# Patient Record
Sex: FEMALE | Race: OTHER | HISPANIC OR LATINO | URBAN - METROPOLITAN AREA
[De-identification: names, ages, dates, MRNs, and addresses within clinical notes are randomized per-mention and may not be internally consistent; named-entity substitution may affect disease eponyms.]

---

## 2017-10-24 ENCOUNTER — EMERGENCY (EMERGENCY)
Facility: HOSPITAL | Age: 82
LOS: 0 days | Discharge: HOME | End: 2017-10-24
Attending: EMERGENCY MEDICINE
Payer: COMMERCIAL

## 2017-10-24 VITALS
HEART RATE: 95 BPM | SYSTOLIC BLOOD PRESSURE: 140 MMHG | RESPIRATION RATE: 19 BRPM | OXYGEN SATURATION: 99 % | WEIGHT: 139.99 LBS | DIASTOLIC BLOOD PRESSURE: 83 MMHG | HEIGHT: 62 IN | TEMPERATURE: 99 F

## 2017-10-24 PROBLEM — Z00.00 ENCOUNTER FOR PREVENTIVE HEALTH EXAMINATION: Status: ACTIVE | Noted: 2017-10-24

## 2017-10-24 PROCEDURE — 99283 EMERGENCY DEPT VISIT LOW MDM: CPT

## 2017-10-24 RX ORDER — FLUCONAZOLE 150 MG/1
150 TABLET ORAL ONCE
Qty: 0 | Refills: 0 | Status: COMPLETED | OUTPATIENT
Start: 2017-10-24 | End: 2017-10-24

## 2017-10-24 RX ORDER — FLUCONAZOLE 150 MG/1
1 TABLET ORAL
Qty: 1 | Refills: 0 | OUTPATIENT
Start: 2017-10-24

## 2017-10-24 RX ORDER — TERBINAFINE HYDROCHLORIDE 1 G/100G
1 CREAM TOPICAL
Qty: 1 | Refills: 0 | OUTPATIENT
Start: 2017-10-24

## 2017-10-24 RX ADMIN — FLUCONAZOLE 150 MILLIGRAM(S): 150 TABLET ORAL at 21:38

## 2017-10-24 NOTE — ED PROVIDER NOTE - MEDICAL DECISION MAKING DETAILS
liklely fungal infx given location in sweaty areas, pruritic and possible ring-worm like lesions seen. pt appears well/afebrile, and no crepitus. given antifungal cream, pill, and recommended antifungal soap. pt appears well and non-toxic. . VSS. Sx have improved during ED stay. No acute events during ED observation period. Precautions given to return to the ED if sx persist or change. Pt expresses understanding and has no further questions. Pt feels comfortable and wishes to be discharged home. Instructed to follow up with PMD in 24 hrs. dermatology f/u.

## 2017-10-24 NOTE — ED PROVIDER NOTE - PHYSICAL EXAMINATION
GEN: Alert, NAD  HEAD: atraumatic, EOMI, PERRL, normal lids/conjunctiva  ENT: normal hearing, patent oropharynx without erythema/exudate, uvula midline  NECK: +supple, no tenderness/meningismus, +Trachea midline  PULM: Bilateral BS, normal resp effort, no wheeze/stridor/retractions  CV: RRR, no M/R/G, +dist ext pulses  ABD: soft, NT/ND  BACK: no CVAT, no midline tenderness  MSK: no edema/erythema/cyanosis  SKIN: no rash  NEURO: AAOx3, no sensory/motor deficits, CN 2-12 intact  under b/l breasts and at b/l groins - slightly erythematous patchy area, no macular/papular lesions, has secondary excoriations seen w slightly sloughing of the superficial skin, no blistering, ring-worm like lesion seen under R breast area, no crepitus,

## 2017-10-24 NOTE — ED PROVIDER NOTE - PRESENTING SYMPTOMS DETAILS
88F no pmhx/shx comes in w rash started 1 month intermittently under her b/l breasts and groins. pruritis, got better but then worsened. steroid cream helps but this time around it did not, no fever/chills. no oral lesions, no genital lesions. pt does not wear a bra and tends to sweat under her breasts per family  ROS: No fever/chills, No photophobia/eye pain/changes in vision, No ear pain/sore throat/dysphagia, No chest pain/palpitations, no SOB/cough/wheeze/stridor, No abdominal pain/N/V/D, no dysuria/frequency/discharge, No neck/back pain,  no changes in neurological status/function.

## 2017-10-24 NOTE — ED ADULT NURSE REASSESSMENT NOTE - NS ED NURSE REASSESS COMMENT FT1
patient remains alert and orientedx 3, no complaints made, denies pain at this time. left with daughter to drive her home

## 2017-10-25 DIAGNOSIS — B49 UNSPECIFIED MYCOSIS: ICD-10-CM

## 2017-10-25 DIAGNOSIS — R21 RASH AND OTHER NONSPECIFIC SKIN ERUPTION: ICD-10-CM

## 2017-11-19 ENCOUNTER — INPATIENT (INPATIENT)
Facility: HOSPITAL | Age: 82
LOS: 1 days | Discharge: ANOTHER HOSPITAL | End: 2017-11-21
Attending: INTERNAL MEDICINE | Admitting: INTERNAL MEDICINE
Payer: COMMERCIAL

## 2017-11-19 VITALS
RESPIRATION RATE: 22 BRPM | HEART RATE: 107 BPM | SYSTOLIC BLOOD PRESSURE: 178 MMHG | TEMPERATURE: 97 F | DIASTOLIC BLOOD PRESSURE: 103 MMHG | OXYGEN SATURATION: 100 %

## 2017-11-19 LAB
ALBUMIN SERPL ELPH-MCNC: 3.9 G/DL — SIGNIFICANT CHANGE UP (ref 3.3–5)
ALP SERPL-CCNC: 65 U/L — SIGNIFICANT CHANGE UP (ref 40–120)
ALT FLD-CCNC: 21 U/L — SIGNIFICANT CHANGE UP (ref 12–78)
ANION GAP SERPL CALC-SCNC: 7 MMOL/L — SIGNIFICANT CHANGE UP (ref 5–17)
APPEARANCE UR: CLEAR — SIGNIFICANT CHANGE UP
AST SERPL-CCNC: 15 U/L — SIGNIFICANT CHANGE UP (ref 15–37)
BACTERIA # UR AUTO: ABNORMAL
BASOPHILS # BLD AUTO: 0 K/UL — SIGNIFICANT CHANGE UP (ref 0–0.2)
BASOPHILS NFR BLD AUTO: 0.8 % — SIGNIFICANT CHANGE UP (ref 0–2)
BILIRUB SERPL-MCNC: 0.3 MG/DL — SIGNIFICANT CHANGE UP (ref 0.2–1.2)
BILIRUB UR-MCNC: NEGATIVE — SIGNIFICANT CHANGE UP
BUN SERPL-MCNC: 26 MG/DL — HIGH (ref 7–23)
CALCIUM SERPL-MCNC: 8.9 MG/DL — SIGNIFICANT CHANGE UP (ref 8.5–10.1)
CHLORIDE SERPL-SCNC: 105 MMOL/L — SIGNIFICANT CHANGE UP (ref 96–108)
CK MB CFR SERPL CALC: 6.8 NG/ML — HIGH (ref 0.5–3.6)
CK SERPL-CCNC: 166 U/L — SIGNIFICANT CHANGE UP (ref 26–192)
CO2 SERPL-SCNC: 28 MMOL/L — SIGNIFICANT CHANGE UP (ref 22–31)
COLOR SPEC: YELLOW — SIGNIFICANT CHANGE UP
CREAT SERPL-MCNC: 0.73 MG/DL — SIGNIFICANT CHANGE UP (ref 0.5–1.3)
DIFF PNL FLD: ABNORMAL
EOSINOPHIL # BLD AUTO: 0.2 K/UL — SIGNIFICANT CHANGE UP (ref 0–0.5)
EOSINOPHIL NFR BLD AUTO: 3.8 % — SIGNIFICANT CHANGE UP (ref 0–6)
EPI CELLS # UR: SIGNIFICANT CHANGE UP
FLUAV SPEC QL CULT: NEGATIVE — SIGNIFICANT CHANGE UP
FLUBV AG SPEC QL IA: NEGATIVE — SIGNIFICANT CHANGE UP
GLUCOSE SERPL-MCNC: 108 MG/DL — HIGH (ref 70–99)
GLUCOSE UR QL: 1000 MG/DL
HCT VFR BLD CALC: 36.2 % — SIGNIFICANT CHANGE UP (ref 34.5–45)
HGB BLD-MCNC: 11.6 G/DL — SIGNIFICANT CHANGE UP (ref 11.5–15.5)
KETONES UR-MCNC: NEGATIVE — SIGNIFICANT CHANGE UP
LEUKOCYTE ESTERASE UR-ACNC: ABNORMAL
LIDOCAIN IGE QN: 59 U/L — LOW (ref 73–393)
LYMPHOCYTES # BLD AUTO: 1.3 K/UL — SIGNIFICANT CHANGE UP (ref 1–3.3)
LYMPHOCYTES # BLD AUTO: 23.8 % — SIGNIFICANT CHANGE UP (ref 13–44)
MCHC RBC-ENTMCNC: 29 PG — SIGNIFICANT CHANGE UP (ref 27–34)
MCHC RBC-ENTMCNC: 32.1 GM/DL — SIGNIFICANT CHANGE UP (ref 32–36)
MCV RBC AUTO: 90.3 FL — SIGNIFICANT CHANGE UP (ref 80–100)
MONOCYTES # BLD AUTO: 0.5 K/UL — SIGNIFICANT CHANGE UP (ref 0–0.9)
MONOCYTES NFR BLD AUTO: 8.4 % — SIGNIFICANT CHANGE UP (ref 2–14)
NEUTROPHILS # BLD AUTO: 3.6 K/UL — SIGNIFICANT CHANGE UP (ref 1.8–7.4)
NEUTROPHILS NFR BLD AUTO: 63.3 % — SIGNIFICANT CHANGE UP (ref 43–77)
NITRITE UR-MCNC: NEGATIVE — SIGNIFICANT CHANGE UP
NT-PROBNP SERPL-SCNC: 770 PG/ML — HIGH (ref 0–450)
PH UR: 5 — SIGNIFICANT CHANGE UP (ref 5–8)
PLATELET # BLD AUTO: 165 K/UL — SIGNIFICANT CHANGE UP (ref 150–400)
POTASSIUM SERPL-MCNC: 3.9 MMOL/L — SIGNIFICANT CHANGE UP (ref 3.5–5.3)
POTASSIUM SERPL-SCNC: 3.9 MMOL/L — SIGNIFICANT CHANGE UP (ref 3.5–5.3)
PROT SERPL-MCNC: 8.1 GM/DL — SIGNIFICANT CHANGE UP (ref 6–8.3)
PROT UR-MCNC: 15 MG/DL
RBC # BLD: 4 M/UL — SIGNIFICANT CHANGE UP (ref 3.8–5.2)
RBC # FLD: 13.2 % — SIGNIFICANT CHANGE UP (ref 11–15)
RBC CASTS # UR COMP ASSIST: SIGNIFICANT CHANGE UP /HPF (ref 0–4)
SODIUM SERPL-SCNC: 140 MMOL/L — SIGNIFICANT CHANGE UP (ref 135–145)
SP GR SPEC: 1.02 — SIGNIFICANT CHANGE UP (ref 1.01–1.02)
TROPONIN I SERPL-MCNC: 0.07 NG/ML — HIGH (ref 0.01–0.04)
TROPONIN I SERPL-MCNC: 0.07 NG/ML — HIGH (ref 0.01–0.04)
UROBILINOGEN FLD QL: NEGATIVE MG/DL — SIGNIFICANT CHANGE UP
WBC # BLD: 5.6 K/UL — SIGNIFICANT CHANGE UP (ref 3.8–10.5)
WBC # FLD AUTO: 5.6 K/UL — SIGNIFICANT CHANGE UP (ref 3.8–10.5)
WBC UR QL: ABNORMAL

## 2017-11-19 PROCEDURE — 71010: CPT | Mod: 26

## 2017-11-19 PROCEDURE — 93010 ELECTROCARDIOGRAM REPORT: CPT

## 2017-11-19 PROCEDURE — 99285 EMERGENCY DEPT VISIT HI MDM: CPT

## 2017-11-19 RX ORDER — DOCUSATE SODIUM 100 MG
100 CAPSULE ORAL THREE TIMES A DAY
Qty: 0 | Refills: 0 | Status: DISCONTINUED | OUTPATIENT
Start: 2017-11-19 | End: 2017-11-21

## 2017-11-19 RX ORDER — ASPIRIN/CALCIUM CARB/MAGNESIUM 324 MG
325 TABLET ORAL ONCE
Qty: 0 | Refills: 0 | Status: COMPLETED | OUTPATIENT
Start: 2017-11-19 | End: 2017-11-19

## 2017-11-19 RX ORDER — IPRATROPIUM/ALBUTEROL SULFATE 18-103MCG
3 AEROSOL WITH ADAPTER (GRAM) INHALATION
Qty: 0 | Refills: 0 | Status: COMPLETED | OUTPATIENT
Start: 2017-11-19 | End: 2017-11-19

## 2017-11-19 RX ORDER — CEFTRIAXONE 500 MG/1
1 INJECTION, POWDER, FOR SOLUTION INTRAMUSCULAR; INTRAVENOUS ONCE
Qty: 0 | Refills: 0 | Status: COMPLETED | OUTPATIENT
Start: 2017-11-19 | End: 2017-11-19

## 2017-11-19 RX ORDER — IPRATROPIUM/ALBUTEROL SULFATE 18-103MCG
3 AEROSOL WITH ADAPTER (GRAM) INHALATION EVERY 6 HOURS
Qty: 0 | Refills: 0 | Status: DISCONTINUED | OUTPATIENT
Start: 2017-11-19 | End: 2017-11-21

## 2017-11-19 RX ORDER — CEFTRIAXONE 500 MG/1
1 INJECTION, POWDER, FOR SOLUTION INTRAMUSCULAR; INTRAVENOUS EVERY 24 HOURS
Qty: 0 | Refills: 0 | Status: DISCONTINUED | OUTPATIENT
Start: 2017-11-20 | End: 2017-11-21

## 2017-11-19 RX ORDER — PANTOPRAZOLE SODIUM 20 MG/1
40 TABLET, DELAYED RELEASE ORAL
Qty: 0 | Refills: 0 | Status: DISCONTINUED | OUTPATIENT
Start: 2017-11-19 | End: 2017-11-21

## 2017-11-19 RX ORDER — SODIUM CHLORIDE 9 MG/ML
1000 INJECTION INTRAMUSCULAR; INTRAVENOUS; SUBCUTANEOUS
Qty: 0 | Refills: 0 | Status: DISCONTINUED | OUTPATIENT
Start: 2017-11-19 | End: 2017-11-21

## 2017-11-19 RX ORDER — CEFTRIAXONE 500 MG/1
INJECTION, POWDER, FOR SOLUTION INTRAMUSCULAR; INTRAVENOUS
Qty: 0 | Refills: 0 | Status: DISCONTINUED | OUTPATIENT
Start: 2017-11-19 | End: 2017-11-21

## 2017-11-19 RX ORDER — ASPIRIN/CALCIUM CARB/MAGNESIUM 324 MG
325 TABLET ORAL DAILY
Qty: 0 | Refills: 0 | Status: DISCONTINUED | OUTPATIENT
Start: 2017-11-19 | End: 2017-11-21

## 2017-11-19 RX ADMIN — Medication 3 MILLILITER(S): at 11:56

## 2017-11-19 RX ADMIN — Medication 50 MILLIGRAM(S): at 11:19

## 2017-11-19 RX ADMIN — SODIUM CHLORIDE 70 MILLILITER(S): 9 INJECTION INTRAMUSCULAR; INTRAVENOUS; SUBCUTANEOUS at 22:45

## 2017-11-19 RX ADMIN — Medication 3 MILLILITER(S): at 11:10

## 2017-11-19 RX ADMIN — Medication 325 MILLIGRAM(S): at 14:48

## 2017-11-19 RX ADMIN — Medication 40 MILLIGRAM(S): at 22:48

## 2017-11-19 RX ADMIN — Medication 3 MILLILITER(S): at 11:00

## 2017-11-19 RX ADMIN — SODIUM CHLORIDE 70 MILLILITER(S): 9 INJECTION INTRAMUSCULAR; INTRAVENOUS; SUBCUTANEOUS at 15:33

## 2017-11-19 RX ADMIN — Medication 100 MILLIGRAM(S): at 22:48

## 2017-11-19 RX ADMIN — CEFTRIAXONE 100 GRAM(S): 500 INJECTION, POWDER, FOR SOLUTION INTRAMUSCULAR; INTRAVENOUS at 15:33

## 2017-11-19 RX ADMIN — Medication 325 MILLIGRAM(S): at 22:51

## 2017-11-19 RX ADMIN — Medication 125 MILLIGRAM(S): at 15:33

## 2017-11-19 NOTE — ED PROVIDER NOTE - OBJECTIVE STATEMENT
Pt is a 87 yo lady with a pmhx of HTN, asthma who presents to the ED with sob and wheezing. Last exacerbation was years ago. Has a cough, non productive. Son has similar symptoms as well. No chest pain, no fevers, no hx of dvt/pe. No rhinorrhea.

## 2017-11-19 NOTE — ED ADULT NURSE NOTE - OBJECTIVE STATEMENT
Patient alert, with daughter at bedside stating that she started wheezing last night. States this morning she started getting a head ache. Denies taking any medication at home to alleviate the symptoms. States patient is not allergic to anything, but that she started sneezing a lot yesterday and found that unusual. States another family member is sick with similar symptoms. History of asthma and HTN. Is currently not taking any prescribed medication at home.

## 2017-11-20 DIAGNOSIS — J45.41 MODERATE PERSISTENT ASTHMA WITH (ACUTE) EXACERBATION: ICD-10-CM

## 2017-11-20 DIAGNOSIS — I10 ESSENTIAL (PRIMARY) HYPERTENSION: ICD-10-CM

## 2017-11-20 DIAGNOSIS — I21.4 NON-ST ELEVATION (NSTEMI) MYOCARDIAL INFARCTION: ICD-10-CM

## 2017-11-20 LAB
ANION GAP SERPL CALC-SCNC: 7 MMOL/L — SIGNIFICANT CHANGE UP (ref 5–17)
BASOPHILS # BLD AUTO: 0 K/UL — SIGNIFICANT CHANGE UP (ref 0–0.2)
BASOPHILS NFR BLD AUTO: 0.3 % — SIGNIFICANT CHANGE UP (ref 0–2)
BUN SERPL-MCNC: 22 MG/DL — SIGNIFICANT CHANGE UP (ref 7–23)
CALCIUM SERPL-MCNC: 8.2 MG/DL — LOW (ref 8.5–10.1)
CHLORIDE SERPL-SCNC: 108 MMOL/L — SIGNIFICANT CHANGE UP (ref 96–108)
CHOLEST SERPL-MCNC: 172 MG/DL — SIGNIFICANT CHANGE UP (ref 10–199)
CK SERPL-CCNC: 391 U/L — HIGH (ref 26–192)
CK SERPL-CCNC: 501 U/L — HIGH (ref 26–192)
CO2 SERPL-SCNC: 26 MMOL/L — SIGNIFICANT CHANGE UP (ref 22–31)
CREAT SERPL-MCNC: 0.7 MG/DL — SIGNIFICANT CHANGE UP (ref 0.5–1.3)
CULTURE RESULTS: NO GROWTH — SIGNIFICANT CHANGE UP
EOSINOPHIL # BLD AUTO: 0 K/UL — SIGNIFICANT CHANGE UP (ref 0–0.5)
EOSINOPHIL NFR BLD AUTO: 0 % — SIGNIFICANT CHANGE UP (ref 0–6)
GLUCOSE SERPL-MCNC: 137 MG/DL — HIGH (ref 70–99)
HCT VFR BLD CALC: 32.2 % — LOW (ref 34.5–45)
HDLC SERPL-MCNC: 74 MG/DL — SIGNIFICANT CHANGE UP (ref 40–125)
HGB BLD-MCNC: 10.6 G/DL — LOW (ref 11.5–15.5)
LIPID PNL WITH DIRECT LDL SERPL: 90 MG/DL — SIGNIFICANT CHANGE UP
LYMPHOCYTES # BLD AUTO: 0.7 K/UL — LOW (ref 1–3.3)
LYMPHOCYTES # BLD AUTO: 12.4 % — LOW (ref 13–44)
MCHC RBC-ENTMCNC: 29.6 PG — SIGNIFICANT CHANGE UP (ref 27–34)
MCHC RBC-ENTMCNC: 32.8 GM/DL — SIGNIFICANT CHANGE UP (ref 32–36)
MCV RBC AUTO: 90.2 FL — SIGNIFICANT CHANGE UP (ref 80–100)
MONOCYTES # BLD AUTO: 0.2 K/UL — SIGNIFICANT CHANGE UP (ref 0–0.9)
MONOCYTES NFR BLD AUTO: 3.1 % — SIGNIFICANT CHANGE UP (ref 2–14)
NEUTROPHILS # BLD AUTO: 4.8 K/UL — SIGNIFICANT CHANGE UP (ref 1.8–7.4)
NEUTROPHILS NFR BLD AUTO: 84.2 % — HIGH (ref 43–77)
PLATELET # BLD AUTO: 130 K/UL — LOW (ref 150–400)
POTASSIUM SERPL-MCNC: 4 MMOL/L — SIGNIFICANT CHANGE UP (ref 3.5–5.3)
POTASSIUM SERPL-SCNC: 4 MMOL/L — SIGNIFICANT CHANGE UP (ref 3.5–5.3)
RBC # BLD: 3.57 M/UL — LOW (ref 3.8–5.2)
RBC # FLD: 13.2 % — SIGNIFICANT CHANGE UP (ref 11–15)
SODIUM SERPL-SCNC: 141 MMOL/L — SIGNIFICANT CHANGE UP (ref 135–145)
SPECIMEN SOURCE: SIGNIFICANT CHANGE UP
TOTAL CHOLESTEROL/HDL RATIO MEASUREMENT: 2.3 RATIO — LOW (ref 3.3–7.1)
TRIGL SERPL-MCNC: 38 MG/DL — SIGNIFICANT CHANGE UP (ref 10–149)
TROPONIN I SERPL-MCNC: 0.07 NG/ML — HIGH (ref 0.01–0.04)
TROPONIN I SERPL-MCNC: 0.08 NG/ML — HIGH (ref 0.01–0.04)
WBC # BLD: 5.8 K/UL — SIGNIFICANT CHANGE UP (ref 3.8–10.5)
WBC # FLD AUTO: 5.8 K/UL — SIGNIFICANT CHANGE UP (ref 3.8–10.5)

## 2017-11-20 RX ORDER — METOPROLOL TARTRATE 50 MG
25 TABLET ORAL
Qty: 0 | Refills: 0 | Status: DISCONTINUED | OUTPATIENT
Start: 2017-11-20 | End: 2017-11-21

## 2017-11-20 RX ORDER — HEPARIN SODIUM 5000 [USP'U]/ML
5000 INJECTION INTRAVENOUS; SUBCUTANEOUS EVERY 12 HOURS
Qty: 0 | Refills: 0 | Status: DISCONTINUED | OUTPATIENT
Start: 2017-11-20 | End: 2017-11-21

## 2017-11-20 RX ADMIN — Medication 325 MILLIGRAM(S): at 11:17

## 2017-11-20 RX ADMIN — Medication 40 MILLIGRAM(S): at 05:02

## 2017-11-20 RX ADMIN — Medication 25 MILLIGRAM(S): at 17:15

## 2017-11-20 RX ADMIN — CEFTRIAXONE 100 GRAM(S): 500 INJECTION, POWDER, FOR SOLUTION INTRAMUSCULAR; INTRAVENOUS at 15:02

## 2017-11-20 RX ADMIN — Medication 40 MILLIGRAM(S): at 21:43

## 2017-11-20 RX ADMIN — HEPARIN SODIUM 5000 UNIT(S): 5000 INJECTION INTRAVENOUS; SUBCUTANEOUS at 17:14

## 2017-11-20 RX ADMIN — Medication 3 MILLILITER(S): at 11:20

## 2017-11-20 RX ADMIN — Medication 3 MILLILITER(S): at 17:09

## 2017-11-20 RX ADMIN — Medication 3 MILLILITER(S): at 00:26

## 2017-11-20 RX ADMIN — Medication 100 MILLIGRAM(S): at 05:02

## 2017-11-20 RX ADMIN — Medication 1 TABLET(S): at 11:17

## 2017-11-20 RX ADMIN — Medication 100 MILLIGRAM(S): at 21:43

## 2017-11-20 RX ADMIN — Medication 3 MILLILITER(S): at 23:55

## 2017-11-20 RX ADMIN — PANTOPRAZOLE SODIUM 40 MILLIGRAM(S): 20 TABLET, DELAYED RELEASE ORAL at 08:06

## 2017-11-20 RX ADMIN — Medication 100 MILLIGRAM(S): at 15:02

## 2017-11-20 RX ADMIN — Medication 40 MILLIGRAM(S): at 15:02

## 2017-11-20 NOTE — CONSULT NOTE ADULT - SUBJECTIVE AND OBJECTIVE BOX
Patient is a 88y old  Female who presents with a chief complaint of Shortness of breath. (2017 13:00)        PAST MEDICAL & SURGICAL HISTORY:  No pertinent past medical history  No significant past surgical history      Summary of admission HPI: NSTEMI                PREVIOUS DIAGNOSTIC TESTING:      ECHO  FINDINGS:    STRESS  FINDINGS:    CATHETERIZATION  FINDINGS:    ELECTROPHYSIOLOGY STUDY  FINDINGS:    CAROTID ULTRASOUND:  FINDINGS    VENOUS DUPLEX SCAN:  FINDINGS:    CHEST CT PULMONARY ANGIO with IV Contrast:  FINDINGS:  MEDICATIONS  (STANDING):  ALBUTerol/ipratropium for Nebulization 3 milliLiter(s) Nebulizer every 6 hours  aspirin 325 milliGRAM(s) Oral daily  cefTRIAXone   IVPB      cefTRIAXone   IVPB 1 Gram(s) IV Intermittent every 24 hours  docusate sodium 100 milliGRAM(s) Oral three times a day  heparin  Injectable 5000 Unit(s) SubCutaneous every 12 hours  methylPREDNISolone sodium succinate Injectable 40 milliGRAM(s) IV Push every 8 hours  metoprolol     tartrate 25 milliGRAM(s) Oral two times a day  multivitamin 1 Tablet(s) Oral daily  pantoprazole    Tablet 40 milliGRAM(s) Oral before breakfast  sodium chloride 0.9%. 1000 milliLiter(s) (70 mL/Hr) IV Continuous <Continuous>    MEDICATIONS  (PRN):      FAMILY HISTORY:      SOCIAL HISTORY:    CIGARETTES:    ALCOHOL:    REVIEW OF SYSTEMS:    CONSTITUTIONAL: No fever, weight loss, chills, shakes, or fatigue  EYES: No eye pain, visual disturbances, or discharge  ENMT:  No difficulty hearing, tinnitus, vertigo; No sinus or throat pain  NECK: No pain or stiffness  BREASTS: No pain, masses, or nipple discharge  RESPIRATORY: No cough, wheezing, hemoptysis, or shortness of breath  CARDIOVASCULAR: No chest pain, dyspnea, palpitations, dizziness, syncope, paroxysmal nocturnal dyspnea, orthopnea, or arm or leg swelling  GASTROINTESTINAL: No abdominal  or epigastric pain, nausea, vomiting, hematemesis, diarrhea, constipation, melena or bright red blood.  GENITOURINARY: No dysuria, nocturia, hematuria, or urinary incontinence  NEUROLOGICAL: No headaches, memory loss, slurred speech, limb weakness, loss of strength, numbness, or tremors  SKIN: No itching, burning, rashes, or lesions   LYMPH NODES: No enlarged glands  ENDOCRINE: No heat or cold intolerance, or hair loss  MUSCULOSKELETAL: No joint pain or swelling, muscle, back, or extremity pain  PSYCHIATRIC: No depression, anxiety, or difficulty sleeping  HEME/LYMPH: No easy bruising or bleeding gums  ALLERY AND IMMUNOLOGIC: No hives or rash.      Vital Signs Last 24 Hrs  T(C): 37.1 (2017 16:40), Max: 37.1 (2017 00:18)  T(F): 98.7 (2017 16:40), Max: 98.8 (2017 00:18)  HR: 76 (2017 17:31) (75 - 98)  BP: 150/65 (2017 16:40) (143/62 - 163/76)  BP(mean): --  RR: 17 (2017 16:40) (14 - 18)  SpO2: 98% (2017 17:31) (97% - 99%)    PHYSICAL EXAM:    GENERAL: In no apparent distress, well nourished, and hydrated.  HEAD:  Atraumatic, Normocephalic  EYES: EOMI, PERRLA, conjunctiva and sclera clear  ENMT: No tonsillar erythema, exudates, or enlargement; Moist mucous membranes, Good dentition, No lesions  NECK: Supple and normal thyroid.  No JVD or carotid bruit.  Carotid pulse is 2+ bilaterally.  HEART: Regular rate and rhythm; No murmurs, rubs, or gallops.  PULMONARY: Clear to auscultation and perfusion.  No rales, wheezing, or rhonchi bilaterally.  ABDOMEN: Soft, Nontender, Nondistended; Bowel sounds present  EXTREMITIES:  2+ Peripheral Pulses, No clubbing, cyanosis, or edema  LYMPH: No lymphadenopathy noted  NEUROLOGICAL: Grossly nonfocal          INTERPRETATION OF TELEMETRY:    ECG:    I&O's Detail    2017 07:  -  2017 07:00  --------------------------------------------------------  IN:    sodium chloride 0.9%.: 630 mL  Total IN: 630 mL    OUT:    Voided: 650 mL  Total OUT: 650 mL    Total NET: -20 mL      2017 07:01  -  2017 18:44  --------------------------------------------------------  IN:    Oral Fluid: 240 mL  Total IN: 240 mL    OUT:  Total OUT: 0 mL    Total NET: 240 mL          LABS:                        10.6   5.8   )-----------( 130      ( 2017 07:02 )             32.2     11-20    141  |  108  |  22  ----------------------------<  137<H>  4.0   |  26  |  0.70    Ca    8.2<L>      2017 07:02    TPro  8.1  /  Alb  3.9  /  TBili  0.3  /  DBili  x   /  AST  15  /  ALT  21  /  AlkPhos  65  11-19    CARDIAC MARKERS ( 2017 09:17 )  .081 ng/mL / x     / 501 U/L / x     / x      CARDIAC MARKERS ( 2017 02:36 )  .067 ng/mL / x     / 391 U/L / x     / x      CARDIAC MARKERS ( 2017 17:26 )  .074 ng/mL / x     / 166 U/L / x     / x      CARDIAC MARKERS ( 2017 11:20 )  .066 ng/mL / x     / x     / x     / 6.8 ng/mL        Urinalysis Basic - ( 2017 20:04 )    Color: Yellow / Appearance: Clear / S.020 / pH: x  Gluc: x / Ketone: Negative  / Bili: Negative / Urobili: Negative mg/dL   Blood: x / Protein: 15 mg/dL / Nitrite: Negative   Leuk Esterase: Trace / RBC: 0-2 /HPF / WBC 6-10   Sq Epi: x / Non Sq Epi: Few / Bacteria: Moderate      BNP  I&O's Detail    2017 07:  -  2017 07:00  --------------------------------------------------------  IN:    sodium chloride 0.9%.: 630 mL  Total IN: 630 mL    OUT:    Voided: 650 mL  Total OUT: 650 mL    Total NET: -20 mL      2017 07:01  -  2017 18:44  --------------------------------------------------------  IN:    Oral Fluid: 240 mL  Total IN: 240 mL    OUT:  Total OUT: 0 mL    Total NET: 240 mL        Daily Height in cm: 162.56 (2017 11:25)    Daily Weight in k.2 (2017 11:25)    RADIOLOGY & ADDITIONAL STUDIES:

## 2017-11-20 NOTE — H&P ADULT - NSHPREVIEWOFSYSTEMS_GEN_ALL_CORE
CONSTITUTIONAL: No fever, weight loss, or fatigue  EYES: No eye pain, visual disturbances, or discharge  ENMT:  No difficulty hearing, tinnitus, vertigo; No sinus or throat pain  NECK: No pain or stiffness  BREASTS: No pain, masses, or nipple discharge  RESPIRATORY:  ++ cough,   +++wheezing,   No chills or hemoptysis;   +++shortness of breath  CARDIOVASCULAR:   ++ chest pain,   No palpitations, dizziness, or leg swelling  GASTROINTESTINAL: No abdominal or epigastric pain. No nausea, vomiting, or hematemesis; No diarrhea or constipation. No melena or hematochezia.  GENITOURINARY: No dysuria, frequency, hematuria, or incontinence  NEUROLOGICAL: No headaches, memory loss, loss of strength, numbness, or tremors  SKIN: No itching, burning, rashes, or lesions   LYMPH NODES: No enlarged glands  ENDOCRINE: No heat or cold intolerance; No hair loss  MUSCULOSKELETAL: No joint pain or swelling; No muscle, back, or extremity pain  PSYCHIATRIC: No depression, anxiety, mood swings, or difficulty sleeping  HEME/LYMPH: No easy bruising, or bleeding gums  ALLERGY AND IMMUNOLOGIC: No hives or eczema

## 2017-11-20 NOTE — H&P ADULT - NSHPLABSRESULTS_GEN_ALL_CORE
LABS:                        10.6   5.8   )-----------( 130      ( 2017 07:02 )             32.2     11-20    141  |  108  |  22  ----------------------------<  137<H>  4.0   |  26  |  0.70    Ca    8.2<L>      2017 07:02    TPro  8.1  /  Alb  3.9  /  TBili  0.3  /  DBili  x   /  AST  15  /  ALT  21  /  AlkPhos  65  11-19      Urinalysis Basic - ( 2017 20:04 )    Color: Yellow / Appearance: Clear / S.020 / pH: x  Gluc: x / Ketone: Negative  / Bili: Negative / Urobili: Negative mg/dL   Blood: x / Protein: 15 mg/dL / Nitrite: Negative   Leuk Esterase: Trace / RBC: 0-2 /HPF / WBC 6-10   Sq Epi: x / Non Sq Epi: Few / Bacteria: Moderate      CAPILLARY BLOOD GLUCOSE        CARDIAC MARKERS ( 2017 09:17 )  .081 ng/mL / x     / 501 U/L / x     / x      CARDIAC MARKERS ( 2017 02:36 )  .067 ng/mL / x     / 391 U/L / x     / x      CARDIAC MARKERS ( 2017 17:26 )  .074 ng/mL / x     / 166 U/L / x     / x      CARDIAC MARKERS ( 2017 11:20 )  .066 ng/mL / x     / x     / x     / 6.8 ng/mL    < from: Xray Chest 1 View AP/PA. (17 @ 11:38) >      IMPRESSION:   No consolidation.        < end of copied text >

## 2017-11-20 NOTE — H&P ADULT - NSHPPHYSICALEXAM_GEN_ALL_CORE
PHYSICAL EXAM:  GENERAL: NAD, well-groomed, well-developed  HEAD:  Atraumatic, Normocephalic  EYES: EOMI, PERRLA, conjunctiva and sclera clear  ENMT: No tonsillar erythema, exudates, or enlargement; Moist mucous membranes, No lesions  NECK: Supple, No JVD, Normal thyroid  NERVOUS SYSTEM:  Alert & Oriented X3; Motor Strength 5/5 B/L upper and lower extremities; DTRs 2+ intact and symmetric  CHEST/LUNG:  Few wheezes bilaterally; No rales, rhonchi,  or rubs  HEART: Regular rate and rhythm; No murmurs, rubs, or gallops  ABDOMEN: Soft, Nontender, Nondistended; Bowel sounds present  EXTREMITIES:  2+ Peripheral Pulses, No clubbing, cyanosis, or edema  LYMPH: No lymphadenopathy noted  SKIN: No rashes or lesions

## 2017-11-20 NOTE — H&P ADULT - HISTORY OF PRESENT ILLNESS
87yo, BF with h/o HTN, asthma who presents to the ED with sob and wheezing for 2 days.  Notes chest pain, tightness for 1 day and nonproductive cough.  Denies palpitation, n/v/d, fever, chills, weakness, abdominal pain, dysuria.   In ER, O2 sat 93%

## 2017-11-21 ENCOUNTER — INPATIENT (INPATIENT)
Facility: HOSPITAL | Age: 82
LOS: 0 days | Discharge: HOME | DRG: 287 | End: 2017-11-22
Attending: SPECIALIST | Admitting: SPECIALIST
Payer: COMMERCIAL

## 2017-11-21 ENCOUNTER — TRANSCRIPTION ENCOUNTER (OUTPATIENT)
Age: 82
End: 2017-11-21

## 2017-11-21 VITALS
DIASTOLIC BLOOD PRESSURE: 83 MMHG | SYSTOLIC BLOOD PRESSURE: 160 MMHG | HEART RATE: 73 BPM | RESPIRATION RATE: 18 BRPM | TEMPERATURE: 98 F | WEIGHT: 132.72 LBS | HEIGHT: 63.78 IN | OXYGEN SATURATION: 95 %

## 2017-11-21 VITALS — OXYGEN SATURATION: 98 %

## 2017-11-21 DIAGNOSIS — I21.4 NON-ST ELEVATION (NSTEMI) MYOCARDIAL INFARCTION: ICD-10-CM

## 2017-11-21 LAB
ANION GAP SERPL CALC-SCNC: 8 MMOL/L — SIGNIFICANT CHANGE UP (ref 5–17)
BUN SERPL-MCNC: 27 MG/DL — HIGH (ref 7–23)
CALCIUM SERPL-MCNC: 7.8 MG/DL — LOW (ref 8.5–10.1)
CHLORIDE SERPL-SCNC: 108 MMOL/L — SIGNIFICANT CHANGE UP (ref 96–108)
CO2 SERPL-SCNC: 26 MMOL/L — SIGNIFICANT CHANGE UP (ref 22–31)
CREAT SERPL-MCNC: 0.8 MG/DL — SIGNIFICANT CHANGE UP (ref 0.5–1.3)
GLUCOSE SERPL-MCNC: 117 MG/DL — HIGH (ref 70–99)
HCT VFR BLD CALC: 32.5 % — LOW (ref 34.5–45)
HGB BLD-MCNC: 10.6 G/DL — LOW (ref 11.5–15.5)
MCHC RBC-ENTMCNC: 29.2 PG — SIGNIFICANT CHANGE UP (ref 27–34)
MCHC RBC-ENTMCNC: 32.5 GM/DL — SIGNIFICANT CHANGE UP (ref 32–36)
MCV RBC AUTO: 89.7 FL — SIGNIFICANT CHANGE UP (ref 80–100)
PLATELET # BLD AUTO: 140 K/UL — LOW (ref 150–400)
POTASSIUM SERPL-MCNC: 3.4 MMOL/L — LOW (ref 3.5–5.3)
POTASSIUM SERPL-SCNC: 3.4 MMOL/L — LOW (ref 3.5–5.3)
RBC # BLD: 3.62 M/UL — LOW (ref 3.8–5.2)
RBC # FLD: 13.5 % — SIGNIFICANT CHANGE UP (ref 11–15)
SODIUM SERPL-SCNC: 142 MMOL/L — SIGNIFICANT CHANGE UP (ref 135–145)
WBC # BLD: 9 K/UL — SIGNIFICANT CHANGE UP (ref 3.8–10.5)
WBC # FLD AUTO: 9 K/UL — SIGNIFICANT CHANGE UP (ref 3.8–10.5)

## 2017-11-21 PROCEDURE — 93010 ELECTROCARDIOGRAM REPORT: CPT

## 2017-11-21 RX ORDER — TIOTROPIUM BROMIDE 18 UG/1
1 CAPSULE ORAL; RESPIRATORY (INHALATION) DAILY
Qty: 0 | Refills: 0 | Status: DISCONTINUED | OUTPATIENT
Start: 2017-11-21 | End: 2017-11-22

## 2017-11-21 RX ORDER — ALBUTEROL 90 UG/1
2 AEROSOL, METERED ORAL EVERY 6 HOURS
Qty: 0 | Refills: 0 | Status: DISCONTINUED | OUTPATIENT
Start: 2017-11-21 | End: 2017-11-22

## 2017-11-21 RX ORDER — METOPROLOL TARTRATE 50 MG
25 TABLET ORAL
Qty: 0 | Refills: 0 | Status: DISCONTINUED | OUTPATIENT
Start: 2017-11-21 | End: 2017-11-21

## 2017-11-21 RX ORDER — ACETAMINOPHEN 500 MG
650 TABLET ORAL ONCE
Qty: 0 | Refills: 0 | Status: COMPLETED | OUTPATIENT
Start: 2017-11-21 | End: 2017-11-21

## 2017-11-21 RX ORDER — ASPIRIN/CALCIUM CARB/MAGNESIUM 324 MG
81 TABLET ORAL DAILY
Qty: 0 | Refills: 0 | Status: DISCONTINUED | OUTPATIENT
Start: 2017-11-21 | End: 2017-11-22

## 2017-11-21 RX ORDER — PANTOPRAZOLE SODIUM 20 MG/1
40 TABLET, DELAYED RELEASE ORAL
Qty: 0 | Refills: 0 | Status: DISCONTINUED | OUTPATIENT
Start: 2017-11-21 | End: 2017-11-22

## 2017-11-21 RX ORDER — POTASSIUM CHLORIDE 20 MEQ
40 PACKET (EA) ORAL ONCE
Qty: 0 | Refills: 0 | Status: DISCONTINUED | OUTPATIENT
Start: 2017-11-21 | End: 2017-11-21

## 2017-11-21 RX ORDER — POTASSIUM CHLORIDE 20 MEQ
40 PACKET (EA) ORAL ONCE
Qty: 0 | Refills: 0 | Status: COMPLETED | OUTPATIENT
Start: 2017-11-21 | End: 2017-11-21

## 2017-11-21 RX ORDER — LABETALOL HCL 100 MG
10 TABLET ORAL ONCE
Qty: 0 | Refills: 0 | Status: COMPLETED | OUTPATIENT
Start: 2017-11-21 | End: 2017-11-21

## 2017-11-21 RX ORDER — METOPROLOL TARTRATE 50 MG
25 TABLET ORAL
Qty: 0 | Refills: 0 | Status: DISCONTINUED | OUTPATIENT
Start: 2017-11-21 | End: 2017-11-22

## 2017-11-21 RX ORDER — SODIUM CHLORIDE 9 MG/ML
3 INJECTION INTRAMUSCULAR; INTRAVENOUS; SUBCUTANEOUS EVERY 8 HOURS
Qty: 0 | Refills: 0 | Status: DISCONTINUED | OUTPATIENT
Start: 2017-11-21 | End: 2017-11-22

## 2017-11-21 RX ORDER — DILTIAZEM HCL 120 MG
120 CAPSULE, EXT RELEASE 24 HR ORAL DAILY
Qty: 0 | Refills: 0 | Status: DISCONTINUED | OUTPATIENT
Start: 2017-11-21 | End: 2017-11-22

## 2017-11-21 RX ORDER — DOCUSATE SODIUM 100 MG
100 CAPSULE ORAL THREE TIMES A DAY
Qty: 0 | Refills: 0 | Status: DISCONTINUED | OUTPATIENT
Start: 2017-11-21 | End: 2017-11-22

## 2017-11-21 RX ADMIN — Medication 10 MILLIGRAM(S): at 17:50

## 2017-11-21 RX ADMIN — Medication 650 MILLIGRAM(S): at 21:45

## 2017-11-21 RX ADMIN — Medication 40 MILLIGRAM(S): at 06:19

## 2017-11-21 RX ADMIN — TIOTROPIUM BROMIDE 1 CAPSULE(S): 18 CAPSULE ORAL; RESPIRATORY (INHALATION) at 18:39

## 2017-11-21 RX ADMIN — Medication 100 MILLIGRAM(S): at 21:45

## 2017-11-21 RX ADMIN — Medication 25 MILLIGRAM(S): at 06:20

## 2017-11-21 RX ADMIN — Medication 25 MILLIGRAM(S): at 18:39

## 2017-11-21 RX ADMIN — Medication 120 MILLIGRAM(S): at 18:39

## 2017-11-21 RX ADMIN — Medication 40 MILLIEQUIVALENT(S): at 10:03

## 2017-11-21 RX ADMIN — ALBUTEROL 2 PUFF(S): 90 AEROSOL, METERED ORAL at 18:39

## 2017-11-21 RX ADMIN — SODIUM CHLORIDE 3 MILLILITER(S): 9 INJECTION INTRAMUSCULAR; INTRAVENOUS; SUBCUTANEOUS at 14:48

## 2017-11-21 RX ADMIN — Medication 100 MILLIGRAM(S): at 06:20

## 2017-11-21 RX ADMIN — Medication 81 MILLIGRAM(S): at 18:39

## 2017-11-21 RX ADMIN — SODIUM CHLORIDE 3 MILLILITER(S): 9 INJECTION INTRAMUSCULAR; INTRAVENOUS; SUBCUTANEOUS at 21:47

## 2017-11-21 RX ADMIN — Medication 3 MILLILITER(S): at 05:35

## 2017-11-21 RX ADMIN — SODIUM CHLORIDE 70 MILLILITER(S): 9 INJECTION INTRAMUSCULAR; INTRAVENOUS; SUBCUTANEOUS at 06:22

## 2017-11-21 RX ADMIN — PANTOPRAZOLE SODIUM 40 MILLIGRAM(S): 20 TABLET, DELAYED RELEASE ORAL at 06:20

## 2017-11-21 NOTE — DISCHARGE NOTE ADULT - PATIENT PORTAL LINK FT
“You can access the FollowHealth Patient Portal, offered by [facility], by registering with the following website: [email or web address]”

## 2017-11-21 NOTE — DISCHARGE NOTE ADULT - CARE PLAN
Principal Discharge DX:	Chest pain  Goal:	Pt remains chest pain free and understands post cath discharge instructions  Instructions for follow-up, activity and diet:	No heavy lifting, strenuous activity, bending, straining or unnecessary stair climbing  for 2 weeks. No sex for 1 week.  No driving for 2 days. You may shower 24 hours following procedure but avoid baths and swimming for 1 week. Check groin site for bleeding and/or swelling daily following procedure. Call your doctor/cardiologist immediately should it occur or if you have increased/persistent pain at the site. Follow up with your cardiologist in 1- 2 weeks. You may call [facility] at [phone] or [phone] after office hours and weekends  with any questions or concerns following your procedure. Take medications as prescribed.

## 2017-11-21 NOTE — DISCHARGE NOTE ADULT - MEDICATION SUMMARY - MEDICATIONS TO TAKE
I will START or STAY ON the medications listed below when I get home from the hospital:    Aspirin Enteric Coated 325 mg oral delayed release tablet  -- 1 tab(s) by mouth once a day Hospital  -- Indication: For Cad    dilTIAZem 120 mg/24 hours oral capsule, extended release  -- 1 cap(s) by mouth once a day  -- Indication: For HTN (hypertension)    metoprolol tartrate 25 mg oral tablet  -- 1 tab(s) by mouth 2 times a day  -- Indication: For HTN (hypertension)    DuoNeb 0.5 mg-2.5 mg/3 mL inhalation solution  -- 3 milliliter(s) inhaled 4 times a day Hospital  -- Indication: For Asthma    tiotropium 18 mcg inhalation capsule  -- 1 cap(s) inhaled once a day  -- Indication: For Asthma

## 2017-11-21 NOTE — DISCHARGE NOTE ADULT - HOSPITAL COURSE
88 year old female with PMHx of HTN, asthma who presents to the [facility] ED on 11/19 c/o 2 days of SOB with wheezing. Also notes chest tightness for 1 day and nonproductive cough. O2 sat 93% in ER. CXR: negative, Troponin peaked 0.08, WBC normal, rapid RVP negative, Influenza A & B negative, Urine positive for bacteria but negative culture, ceftriaxone IV started with Solumedrol IV, now transferred here for cardiac cath (NSTEMI). Pt is now s/p diagnostic cardiac cath via right femoral. Pt tolerated the procedure well. Cardiac cath site benign. Post-procedure discharge instructions discussed and questions addressed

## 2017-11-21 NOTE — DISCHARGE NOTE ADULT - CARE PROVIDER_API CALL
Goyo Shahid), Internal Medicine  [address]  [address]  [place] [address]  Phone: [phone]  Fax: [phone]

## 2017-11-21 NOTE — DISCHARGE NOTE ADULT - MEDICATION SUMMARY - MEDICATIONS TO STOP TAKING
I will STOP taking the medications listed below when I get home from the hospital:    heparin 5000 units/0.5 mL injectable solution  -- 1 dose(s) injectable 2 times a day   Hospital    cefTRIAXone 1 g/50 mL intravenous solution  -- 1 dose(s) intravenous once a day Hospital    Colace 100 mg oral capsule  -- 1 cap(s) by mouth 3 times a day Hospital    SOLU-Medrol 40 mg injection  -- 1 dose(s) injectable 3 times a day Hospital    Protonix 40 mg oral delayed release tablet  -- 1 tab(s) by mouth once a day Hospital    Multiple Vitamins oral tablet, dispersible  -- 1 tab(s) by mouth once a day Hospital

## 2017-11-21 NOTE — DISCHARGE NOTE ADULT - PLAN OF CARE
Pt remains chest pain free and understands post cath discharge instructions No heavy lifting, strenuous activity, bending, straining or unnecessary stair climbing  for 2 weeks. No sex for 1 week.  No driving for 2 days. You may shower 24 hours following procedure but avoid baths and swimming for 1 week. Check groin site for bleeding and/or swelling daily following procedure. Call your doctor/cardiologist immediately should it occur or if you have increased/persistent pain at the site. Follow up with your cardiologist in 1- 2 weeks. You may call [facility] at [phone] or [phone] after office hours and weekends  with any questions or concerns following your procedure. Take medications as prescribed.

## 2017-11-21 NOTE — H&P CARDIOLOGY - HISTORY OF PRESENT ILLNESS
88 year old female with PMHx of HTN, asthma who presents to the [facility] ED on 11/19 c/o 2 days of SOB with wheezing. Also notes chest tightness for 1 day and nonproductive cough. O2 sat 93% in ER. CXR: negative, Troponin peaked 0.08, WBC normal, rapid RVP negative, Influenza A & B negative, Urine positive for bacteria but negative culture, ceftriaxone IV started with Solumedrol IV, now transferred here for cardiac cath (NSTEMI). Pt denies chest pain or SOB currently.

## 2017-11-22 VITALS
DIASTOLIC BLOOD PRESSURE: 72 MMHG | TEMPERATURE: 98 F | SYSTOLIC BLOOD PRESSURE: 125 MMHG | HEART RATE: 77 BPM | RESPIRATION RATE: 17 BRPM | OXYGEN SATURATION: 97 %

## 2017-11-22 LAB
ANION GAP SERPL CALC-SCNC: 11 MMOL/L — SIGNIFICANT CHANGE UP (ref 5–17)
BUN SERPL-MCNC: 20 MG/DL — SIGNIFICANT CHANGE UP (ref 7–23)
CALCIUM SERPL-MCNC: 9 MG/DL — SIGNIFICANT CHANGE UP (ref 8.4–10.5)
CHLORIDE SERPL-SCNC: 105 MMOL/L — SIGNIFICANT CHANGE UP (ref 96–108)
CO2 SERPL-SCNC: 26 MMOL/L — SIGNIFICANT CHANGE UP (ref 22–31)
CREAT SERPL-MCNC: 0.73 MG/DL — SIGNIFICANT CHANGE UP (ref 0.5–1.3)
GLUCOSE SERPL-MCNC: 94 MG/DL — SIGNIFICANT CHANGE UP (ref 70–99)
HCT VFR BLD CALC: 36.7 % — SIGNIFICANT CHANGE UP (ref 34.5–45)
HGB BLD-MCNC: 11.9 G/DL — SIGNIFICANT CHANGE UP (ref 11.5–15.5)
MCHC RBC-ENTMCNC: 30 PG — SIGNIFICANT CHANGE UP (ref 27–34)
MCHC RBC-ENTMCNC: 32.3 GM/DL — SIGNIFICANT CHANGE UP (ref 32–36)
MCV RBC AUTO: 92.9 FL — SIGNIFICANT CHANGE UP (ref 80–100)
PLATELET # BLD AUTO: 137 K/UL — LOW (ref 150–400)
POTASSIUM SERPL-MCNC: 3.9 MMOL/L — SIGNIFICANT CHANGE UP (ref 3.5–5.3)
POTASSIUM SERPL-SCNC: 3.9 MMOL/L — SIGNIFICANT CHANGE UP (ref 3.5–5.3)
RBC # BLD: 3.95 M/UL — SIGNIFICANT CHANGE UP (ref 3.8–5.2)
RBC # FLD: 13.2 % — SIGNIFICANT CHANGE UP (ref 10.3–14.5)
SODIUM SERPL-SCNC: 142 MMOL/L — SIGNIFICANT CHANGE UP (ref 135–145)
WBC # BLD: 5.7 K/UL — SIGNIFICANT CHANGE UP (ref 3.8–10.5)
WBC # FLD AUTO: 5.7 K/UL — SIGNIFICANT CHANGE UP (ref 3.8–10.5)

## 2017-11-22 PROCEDURE — 93010 ELECTROCARDIOGRAM REPORT: CPT

## 2017-11-22 RX ORDER — HEPARIN SODIUM 5000 [USP'U]/ML
1 INJECTION INTRAVENOUS; SUBCUTANEOUS
Qty: 0 | Refills: 0 | COMMUNITY

## 2017-11-22 RX ORDER — IPRATROPIUM/ALBUTEROL SULFATE 18-103MCG
3 AEROSOL WITH ADAPTER (GRAM) INHALATION
Qty: 0 | Refills: 0 | COMMUNITY

## 2017-11-22 RX ORDER — CEFTRIAXONE 500 MG/1
1 INJECTION, POWDER, FOR SOLUTION INTRAMUSCULAR; INTRAVENOUS
Qty: 0 | Refills: 0 | COMMUNITY

## 2017-11-22 RX ORDER — TIOTROPIUM BROMIDE 18 UG/1
1 CAPSULE ORAL; RESPIRATORY (INHALATION)
Qty: 1 | Refills: 3
Start: 2017-11-22 | End: 2018-03-21

## 2017-11-22 RX ORDER — DILTIAZEM HCL 120 MG
1 CAPSULE, EXT RELEASE 24 HR ORAL
Qty: 30 | Refills: 3
Start: 2017-11-22 | End: 2018-03-21

## 2017-11-22 RX ORDER — DOCUSATE SODIUM 100 MG
1 CAPSULE ORAL
Qty: 0 | Refills: 0 | COMMUNITY

## 2017-11-22 RX ORDER — METOPROLOL TARTRATE 50 MG
1 TABLET ORAL
Qty: 60 | Refills: 3
Start: 2017-11-22 | End: 2018-03-21

## 2017-11-22 RX ORDER — ACETAMINOPHEN 500 MG
650 TABLET ORAL ONCE
Qty: 0 | Refills: 0 | Status: DISCONTINUED | OUTPATIENT
Start: 2017-11-22 | End: 2017-11-22

## 2017-11-22 RX ORDER — PANTOPRAZOLE SODIUM 20 MG/1
1 TABLET, DELAYED RELEASE ORAL
Qty: 0 | Refills: 0 | COMMUNITY

## 2017-11-22 RX ORDER — METOPROLOL TARTRATE 50 MG
1 TABLET ORAL
Qty: 0 | Refills: 0 | COMMUNITY

## 2017-11-22 RX ORDER — IPRATROPIUM/ALBUTEROL SULFATE 18-103MCG
3 AEROSOL WITH ADAPTER (GRAM) INHALATION
Qty: 1 | Refills: 3
Start: 2017-11-22 | End: 2018-03-21

## 2017-11-22 RX ADMIN — Medication 1 TABLET(S): at 05:53

## 2017-11-22 RX ADMIN — Medication 100 MILLIGRAM(S): at 05:51

## 2017-11-22 RX ADMIN — Medication 120 MILLIGRAM(S): at 05:51

## 2017-11-22 RX ADMIN — PANTOPRAZOLE SODIUM 40 MILLIGRAM(S): 20 TABLET, DELAYED RELEASE ORAL at 05:51

## 2017-11-22 RX ADMIN — Medication 25 MILLIGRAM(S): at 05:51

## 2017-11-22 RX ADMIN — SODIUM CHLORIDE 3 MILLILITER(S): 9 INJECTION INTRAMUSCULAR; INTRAVENOUS; SUBCUTANEOUS at 05:54

## 2017-11-22 RX ADMIN — ALBUTEROL 2 PUFF(S): 90 AEROSOL, METERED ORAL at 05:50

## 2017-11-22 RX ADMIN — Medication 30 MILLILITER(S): at 10:13

## 2017-11-27 DIAGNOSIS — I10 ESSENTIAL (PRIMARY) HYPERTENSION: ICD-10-CM

## 2017-11-27 DIAGNOSIS — J45.41 MODERATE PERSISTENT ASTHMA WITH (ACUTE) EXACERBATION: ICD-10-CM

## 2017-11-27 DIAGNOSIS — I21.4 NON-ST ELEVATION (NSTEMI) MYOCARDIAL INFARCTION: ICD-10-CM

## 2017-11-27 DIAGNOSIS — R00.0 TACHYCARDIA, UNSPECIFIED: ICD-10-CM

## 2017-12-19 PROCEDURE — 94640 AIRWAY INHALATION TREATMENT: CPT

## 2017-12-19 PROCEDURE — 80048 BASIC METABOLIC PNL TOTAL CA: CPT

## 2017-12-19 PROCEDURE — 99153 MOD SED SAME PHYS/QHP EA: CPT

## 2017-12-19 PROCEDURE — 85027 COMPLETE CBC AUTOMATED: CPT

## 2017-12-19 PROCEDURE — 93005 ELECTROCARDIOGRAM TRACING: CPT

## 2017-12-19 PROCEDURE — C1887: CPT

## 2017-12-19 PROCEDURE — 99152 MOD SED SAME PHYS/QHP 5/>YRS: CPT

## 2017-12-19 PROCEDURE — C1894: CPT

## 2017-12-19 PROCEDURE — 93460 R&L HRT ART/VENTRICLE ANGIO: CPT

## 2017-12-19 PROCEDURE — C1769: CPT

## 2018-12-24 ENCOUNTER — EMERGENCY (EMERGENCY)
Facility: HOSPITAL | Age: 83
LOS: 0 days | Discharge: HOME | End: 2018-12-25
Attending: EMERGENCY MEDICINE
Payer: MEDICARE

## 2018-12-24 VITALS
TEMPERATURE: 98 F | SYSTOLIC BLOOD PRESSURE: 172 MMHG | DIASTOLIC BLOOD PRESSURE: 80 MMHG | HEART RATE: 85 BPM | RESPIRATION RATE: 20 BRPM | OXYGEN SATURATION: 100 %

## 2018-12-24 VITALS
HEART RATE: 95 BPM | SYSTOLIC BLOOD PRESSURE: 181 MMHG | RESPIRATION RATE: 18 BRPM | HEIGHT: 64 IN | DIASTOLIC BLOOD PRESSURE: 91 MMHG | WEIGHT: 175.05 LBS | OXYGEN SATURATION: 95 %

## 2018-12-24 DIAGNOSIS — Z79.82 LONG TERM (CURRENT) USE OF ASPIRIN: ICD-10-CM

## 2018-12-24 DIAGNOSIS — I10 ESSENTIAL (PRIMARY) HYPERTENSION: ICD-10-CM

## 2018-12-24 DIAGNOSIS — M79.662 PAIN IN LEFT LOWER LEG: ICD-10-CM

## 2018-12-24 DIAGNOSIS — R42 DIZZINESS AND GIDDINESS: ICD-10-CM

## 2018-12-24 DIAGNOSIS — R53.1 WEAKNESS: ICD-10-CM

## 2018-12-24 DIAGNOSIS — J45.909 UNSPECIFIED ASTHMA, UNCOMPLICATED: ICD-10-CM

## 2018-12-24 LAB
ALBUMIN SERPL ELPH-MCNC: 3.3 G/DL — SIGNIFICANT CHANGE UP (ref 3.3–5)
ALP SERPL-CCNC: 83 U/L — SIGNIFICANT CHANGE UP (ref 40–120)
ALT FLD-CCNC: 21 U/L — SIGNIFICANT CHANGE UP (ref 12–78)
ANION GAP SERPL CALC-SCNC: 6 MMOL/L — SIGNIFICANT CHANGE UP (ref 5–17)
APPEARANCE UR: CLEAR — SIGNIFICANT CHANGE UP
AST SERPL-CCNC: 21 U/L — SIGNIFICANT CHANGE UP (ref 15–37)
BACTERIA # UR AUTO: ABNORMAL
BASOPHILS # BLD AUTO: 0.02 K/UL — SIGNIFICANT CHANGE UP (ref 0–0.2)
BASOPHILS NFR BLD AUTO: 0.6 % — SIGNIFICANT CHANGE UP (ref 0–2)
BILIRUB SERPL-MCNC: 0.3 MG/DL — SIGNIFICANT CHANGE UP (ref 0.2–1.2)
BILIRUB UR-MCNC: NEGATIVE — SIGNIFICANT CHANGE UP
BUN SERPL-MCNC: 25 MG/DL — HIGH (ref 7–23)
CALCIUM SERPL-MCNC: 9 MG/DL — SIGNIFICANT CHANGE UP (ref 8.5–10.1)
CHLORIDE SERPL-SCNC: 107 MMOL/L — SIGNIFICANT CHANGE UP (ref 96–108)
CO2 SERPL-SCNC: 28 MMOL/L — SIGNIFICANT CHANGE UP (ref 22–31)
COLOR SPEC: YELLOW — SIGNIFICANT CHANGE UP
CREAT SERPL-MCNC: 0.66 MG/DL — SIGNIFICANT CHANGE UP (ref 0.5–1.3)
DIFF PNL FLD: NEGATIVE — SIGNIFICANT CHANGE UP
EOSINOPHIL # BLD AUTO: 0.13 K/UL — SIGNIFICANT CHANGE UP (ref 0–0.5)
EOSINOPHIL NFR BLD AUTO: 4.1 % — SIGNIFICANT CHANGE UP (ref 0–6)
GLUCOSE SERPL-MCNC: 105 MG/DL — HIGH (ref 70–99)
GLUCOSE UR QL: NEGATIVE MG/DL — SIGNIFICANT CHANGE UP
HCT VFR BLD CALC: 38.1 % — SIGNIFICANT CHANGE UP (ref 34.5–45)
HGB BLD-MCNC: 12.1 G/DL — SIGNIFICANT CHANGE UP (ref 11.5–15.5)
IMM GRANULOCYTES NFR BLD AUTO: 1.3 % — SIGNIFICANT CHANGE UP (ref 0–1.5)
KETONES UR-MCNC: NEGATIVE — SIGNIFICANT CHANGE UP
LACTATE SERPL-SCNC: 1.9 MMOL/L — SIGNIFICANT CHANGE UP (ref 0.7–2)
LEUKOCYTE ESTERASE UR-ACNC: NEGATIVE — SIGNIFICANT CHANGE UP
LYMPHOCYTES # BLD AUTO: 0.93 K/UL — LOW (ref 1–3.3)
LYMPHOCYTES # BLD AUTO: 29.2 % — SIGNIFICANT CHANGE UP (ref 13–44)
MCHC RBC-ENTMCNC: 28 PG — SIGNIFICANT CHANGE UP (ref 27–34)
MCHC RBC-ENTMCNC: 31.8 GM/DL — LOW (ref 32–36)
MCV RBC AUTO: 88.2 FL — SIGNIFICANT CHANGE UP (ref 80–100)
MONOCYTES # BLD AUTO: 0.26 K/UL — SIGNIFICANT CHANGE UP (ref 0–0.9)
MONOCYTES NFR BLD AUTO: 8.2 % — SIGNIFICANT CHANGE UP (ref 2–14)
NEUTROPHILS # BLD AUTO: 1.8 K/UL — SIGNIFICANT CHANGE UP (ref 1.8–7.4)
NEUTROPHILS NFR BLD AUTO: 56.6 % — SIGNIFICANT CHANGE UP (ref 43–77)
NITRITE UR-MCNC: NEGATIVE — SIGNIFICANT CHANGE UP
NRBC # BLD: 0 /100 WBCS — SIGNIFICANT CHANGE UP (ref 0–0)
PH UR: 8 — SIGNIFICANT CHANGE UP (ref 5–8)
PLATELET # BLD AUTO: 231 K/UL — SIGNIFICANT CHANGE UP (ref 150–400)
POTASSIUM SERPL-MCNC: 4.2 MMOL/L — SIGNIFICANT CHANGE UP (ref 3.5–5.3)
POTASSIUM SERPL-SCNC: 4.2 MMOL/L — SIGNIFICANT CHANGE UP (ref 3.5–5.3)
PROT SERPL-MCNC: 7.8 GM/DL — SIGNIFICANT CHANGE UP (ref 6–8.3)
PROT UR-MCNC: 15 MG/DL
RBC # BLD: 4.32 M/UL — SIGNIFICANT CHANGE UP (ref 3.8–5.2)
RBC # FLD: 14.2 % — SIGNIFICANT CHANGE UP (ref 10.3–14.5)
SODIUM SERPL-SCNC: 141 MMOL/L — SIGNIFICANT CHANGE UP (ref 135–145)
SP GR SPEC: 1.01 — SIGNIFICANT CHANGE UP (ref 1.01–1.02)
TROPONIN I SERPL-MCNC: <.015 NG/ML — SIGNIFICANT CHANGE UP (ref 0.01–0.04)
UROBILINOGEN FLD QL: NEGATIVE MG/DL — SIGNIFICANT CHANGE UP
WBC # BLD: 3.18 K/UL — LOW (ref 3.8–10.5)
WBC # FLD AUTO: 3.18 K/UL — LOW (ref 3.8–10.5)

## 2018-12-24 PROCEDURE — 71045 X-RAY EXAM CHEST 1 VIEW: CPT | Mod: 26

## 2018-12-24 PROCEDURE — 99284 EMERGENCY DEPT VISIT MOD MDM: CPT

## 2018-12-24 PROCEDURE — 70450 CT HEAD/BRAIN W/O DYE: CPT | Mod: 26

## 2018-12-24 PROCEDURE — 93010 ELECTROCARDIOGRAM REPORT: CPT

## 2018-12-24 PROCEDURE — 93971 EXTREMITY STUDY: CPT | Mod: 26

## 2018-12-24 RX ORDER — SODIUM CHLORIDE 9 MG/ML
3 INJECTION INTRAMUSCULAR; INTRAVENOUS; SUBCUTANEOUS ONCE
Qty: 0 | Refills: 0 | Status: COMPLETED | OUTPATIENT
Start: 2018-12-24 | End: 2018-12-24

## 2018-12-24 RX ORDER — SODIUM CHLORIDE 9 MG/ML
1000 INJECTION INTRAMUSCULAR; INTRAVENOUS; SUBCUTANEOUS ONCE
Qty: 0 | Refills: 0 | Status: COMPLETED | OUTPATIENT
Start: 2018-12-24 | End: 2018-12-24

## 2018-12-24 RX ADMIN — SODIUM CHLORIDE 1000 MILLILITER(S): 9 INJECTION INTRAMUSCULAR; INTRAVENOUS; SUBCUTANEOUS at 18:11

## 2018-12-24 RX ADMIN — SODIUM CHLORIDE 3 MILLILITER(S): 9 INJECTION INTRAMUSCULAR; INTRAVENOUS; SUBCUTANEOUS at 18:16

## 2018-12-24 NOTE — ED PROVIDER NOTE - OBJECTIVE STATEMENT
89yoF; with pmh signif for Asthma, HTN; now p/w left LE cramping today and unable to stand.  denies cp/sob/palp. denies abd pain. denies n/v/d. denies f/c/s.  denies dysuria, hematuria, frequency, urgency.  denies headache. denies focal weakness.  PMH: Asthma, HTN  SOCIAL: No tobacco/illicit substance use/EtOH

## 2018-12-24 NOTE — ED PROVIDER NOTE - PROGRESS NOTE DETAILS
Pt signed out by Dr. Houser as pending CT head and UA. Both are negative, pt has no dysuria, no complaints or symptoms now, ok for d/c with daughter.

## 2018-12-25 PROBLEM — J45.909 UNSPECIFIED ASTHMA, UNCOMPLICATED: Chronic | Status: ACTIVE | Noted: 2017-11-21

## 2018-12-25 PROBLEM — I10 ESSENTIAL (PRIMARY) HYPERTENSION: Chronic | Status: ACTIVE | Noted: 2017-11-21

## 2018-12-25 NOTE — ED ADULT NURSE NOTE - NSIMPLEMENTINTERV_GEN_ALL_ED
Bite block out, intact   Implemented All Universal Safety Interventions:  [place] to call system. Call bell, personal items and telephone within reach. Instruct patient to call for assistance. Room bathroom lighting operational. Non-slip footwear when patient is off stretcher. Physically safe environment: no spills, clutter or unnecessary equipment. Stretcher in lowest position, wheels locked, appropriate side rails in place.

## 2018-12-26 LAB
CULTURE RESULTS: SIGNIFICANT CHANGE UP
SPECIMEN SOURCE: SIGNIFICANT CHANGE UP

## 2020-01-11 ENCOUNTER — EMERGENCY (EMERGENCY)
Facility: HOSPITAL | Age: 85
LOS: 0 days | Discharge: HOME | End: 2020-01-11
Payer: MEDICARE

## 2020-01-11 VITALS
SYSTOLIC BLOOD PRESSURE: 119 MMHG | HEART RATE: 69 BPM | OXYGEN SATURATION: 100 % | HEIGHT: 61 IN | RESPIRATION RATE: 18 BRPM | WEIGHT: 167.99 LBS | TEMPERATURE: 97 F | DIASTOLIC BLOOD PRESSURE: 69 MMHG

## 2020-01-11 DIAGNOSIS — I10 ESSENTIAL (PRIMARY) HYPERTENSION: ICD-10-CM
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
## 2020-01-11 DIAGNOSIS — S40.012A CONTUSION OF LEFT SHOULDER, INITIAL ENCOUNTER: ICD-10-CM

## 2020-01-11 DIAGNOSIS — M79.602 PAIN IN LEFT ARM: ICD-10-CM

## 2020-01-11 PROCEDURE — 99282 EMERGENCY DEPT VISIT SF MDM: CPT

## 2020-01-11 RX ORDER — ACETAMINOPHEN 500 MG
650 TABLET ORAL ONCE
Refills: 0 | Status: COMPLETED | OUTPATIENT
Start: 2020-01-11 | End: 2020-01-11

## 2020-01-11 RX ADMIN — Medication 650 MILLIGRAM(S): at 21:45

## 2020-01-11 RX ADMIN — Medication 650 MILLIGRAM(S): at 21:28

## 2020-01-11 NOTE — ED ADULT TRIAGE NOTE - CHIEF COMPLAINT QUOTE
PW with nontraumatic LUE /  L neck pain  x 2 weeks. Pt blood thinners, no edema noted. PT took 1 tab of Motrin @ 1700 w/ improvement.

## 2020-01-20 NOTE — ED PROVIDER NOTE - CLINICAL SUMMARY MEDICAL DECISION MAKING FREE TEXT BOX
89 y/o F with left shoulder contusion today, NAD pain improved with meds, denies trauma, numbness chest pain and SOB, pt was dc home on pain meds to f/u with PMD.

## 2020-01-20 NOTE — ED PROVIDER NOTE - PATIENT PORTAL LINK FT
You can access the FollowMyHealth Patient Portal offered by [facility] by registering at the following website: [email or web address]. By joining [employer]’s FollowMyHealth portal, you will also be able to view your health information using other applications (apps) compatible with our system.

## 2020-01-20 NOTE — ED PROVIDER NOTE - MUSCULOSKELETAL, MLM
Spine appears normal, range of motion is not limited, left trapezius muscle TTP no swelling or erythema, limited ROM of left shoulder due to pain, sensation. NVI

## 2020-01-20 NOTE — ED PROVIDER NOTE - OBJECTIVE STATEMENT
89 y/o F presents to ED c/o left shoulder pain x 3 weeks worse when she moves her arm as per pt/ daughter she has a habit to sleep on a coffee table after eating on that side and always c/o same shoulder pain, no associated symptoms denies trauma, numbness and other concerns.

## 2020-05-07 ENCOUNTER — INPATIENT (INPATIENT)
Facility: HOSPITAL | Age: 85
LOS: 3 days | Discharge: HOME HEALTH | End: 2020-05-11
Attending: INTERNAL MEDICINE | Admitting: INTERNAL MEDICINE
Payer: MEDICARE

## 2020-05-07 VITALS
HEIGHT: 66 IN | HEART RATE: 48 BPM | TEMPERATURE: 97 F | WEIGHT: 190.04 LBS | RESPIRATION RATE: 18 BRPM | OXYGEN SATURATION: 94 % | SYSTOLIC BLOOD PRESSURE: 224 MMHG | DIASTOLIC BLOOD PRESSURE: 100 MMHG

## 2020-05-07 DIAGNOSIS — G93.41 METABOLIC ENCEPHALOPATHY: ICD-10-CM

## 2020-05-07 DIAGNOSIS — J45.20 MILD INTERMITTENT ASTHMA, UNCOMPLICATED: ICD-10-CM

## 2020-05-07 DIAGNOSIS — I10 ESSENTIAL (PRIMARY) HYPERTENSION: ICD-10-CM

## 2020-05-07 DIAGNOSIS — N30.00 ACUTE CYSTITIS WITHOUT HEMATURIA: ICD-10-CM

## 2020-05-07 LAB
ALBUMIN SERPL ELPH-MCNC: 3.5 G/DL — SIGNIFICANT CHANGE UP (ref 3.3–5)
ALP SERPL-CCNC: 71 U/L — SIGNIFICANT CHANGE UP (ref 40–120)
ALT FLD-CCNC: 19 U/L — SIGNIFICANT CHANGE UP (ref 12–78)
ANION GAP SERPL CALC-SCNC: 7 MMOL/L — SIGNIFICANT CHANGE UP (ref 5–17)
APPEARANCE UR: CLEAR — SIGNIFICANT CHANGE UP
APTT BLD: 27.5 SEC — LOW (ref 28.5–37)
AST SERPL-CCNC: 18 U/L — SIGNIFICANT CHANGE UP (ref 15–37)
BACTERIA # UR AUTO: ABNORMAL
BASOPHILS # BLD AUTO: 0.02 K/UL — SIGNIFICANT CHANGE UP (ref 0–0.2)
BASOPHILS NFR BLD AUTO: 0.5 % — SIGNIFICANT CHANGE UP (ref 0–2)
BILIRUB SERPL-MCNC: 0.4 MG/DL — SIGNIFICANT CHANGE UP (ref 0.2–1.2)
BILIRUB UR-MCNC: NEGATIVE — SIGNIFICANT CHANGE UP
BLD GP AB SCN SERPL QL: SIGNIFICANT CHANGE UP
BUN SERPL-MCNC: 30 MG/DL — HIGH (ref 7–23)
CALCIUM SERPL-MCNC: 8.8 MG/DL — SIGNIFICANT CHANGE UP (ref 8.5–10.1)
CHLORIDE SERPL-SCNC: 109 MMOL/L — HIGH (ref 96–108)
CO2 SERPL-SCNC: 28 MMOL/L — SIGNIFICANT CHANGE UP (ref 22–31)
COLOR SPEC: YELLOW — SIGNIFICANT CHANGE UP
CREAT SERPL-MCNC: 0.84 MG/DL — SIGNIFICANT CHANGE UP (ref 0.5–1.3)
DIFF PNL FLD: ABNORMAL
EOSINOPHIL # BLD AUTO: 0.25 K/UL — SIGNIFICANT CHANGE UP (ref 0–0.5)
EOSINOPHIL NFR BLD AUTO: 6.8 % — HIGH (ref 0–6)
EPI CELLS # UR: SIGNIFICANT CHANGE UP
GLUCOSE SERPL-MCNC: 112 MG/DL — HIGH (ref 70–99)
GLUCOSE UR QL: NEGATIVE MG/DL — SIGNIFICANT CHANGE UP
HCT VFR BLD CALC: 39 % — SIGNIFICANT CHANGE UP (ref 34.5–45)
HGB BLD-MCNC: 12.8 G/DL — SIGNIFICANT CHANGE UP (ref 11.5–15.5)
IMM GRANULOCYTES NFR BLD AUTO: 0.3 % — SIGNIFICANT CHANGE UP (ref 0–1.5)
INR BLD: 0.99 RATIO — SIGNIFICANT CHANGE UP (ref 0.88–1.16)
KETONES UR-MCNC: NEGATIVE — SIGNIFICANT CHANGE UP
LACTATE SERPL-SCNC: 1.1 MMOL/L — SIGNIFICANT CHANGE UP (ref 0.7–2)
LEUKOCYTE ESTERASE UR-ACNC: ABNORMAL
LYMPHOCYTES # BLD AUTO: 2.16 K/UL — SIGNIFICANT CHANGE UP (ref 1–3.3)
LYMPHOCYTES # BLD AUTO: 58.5 % — HIGH (ref 13–44)
MANUAL SMEAR VERIFICATION: YES — SIGNIFICANT CHANGE UP
MCHC RBC-ENTMCNC: 29 PG — SIGNIFICANT CHANGE UP (ref 27–34)
MCHC RBC-ENTMCNC: 32.8 GM/DL — SIGNIFICANT CHANGE UP (ref 32–36)
MCV RBC AUTO: 88.4 FL — SIGNIFICANT CHANGE UP (ref 80–100)
MONOCYTES # BLD AUTO: 0.44 K/UL — SIGNIFICANT CHANGE UP (ref 0–0.9)
MONOCYTES NFR BLD AUTO: 11.9 % — SIGNIFICANT CHANGE UP (ref 2–14)
NEUTROPHILS # BLD AUTO: 0.81 K/UL — LOW (ref 1.8–7.4)
NEUTROPHILS NFR BLD AUTO: 22 % — LOW (ref 43–77)
NITRITE UR-MCNC: NEGATIVE — SIGNIFICANT CHANGE UP
NRBC # BLD: 0 /100 WBCS — SIGNIFICANT CHANGE UP (ref 0–0)
PH UR: 6.5 — SIGNIFICANT CHANGE UP (ref 5–8)
PLAT MORPH BLD: NORMAL — SIGNIFICANT CHANGE UP
PLATELET # BLD AUTO: 150 K/UL — SIGNIFICANT CHANGE UP (ref 150–400)
POTASSIUM SERPL-MCNC: 4.4 MMOL/L — SIGNIFICANT CHANGE UP (ref 3.5–5.3)
POTASSIUM SERPL-SCNC: 4.4 MMOL/L — SIGNIFICANT CHANGE UP (ref 3.5–5.3)
PROT SERPL-MCNC: 7.9 GM/DL — SIGNIFICANT CHANGE UP (ref 6–8.3)
PROT UR-MCNC: 30 MG/DL
PROTHROM AB SERPL-ACNC: 11.1 SEC — SIGNIFICANT CHANGE UP (ref 10–12.9)
RBC # BLD: 4.41 M/UL — SIGNIFICANT CHANGE UP (ref 3.8–5.2)
RBC # FLD: 14.1 % — SIGNIFICANT CHANGE UP (ref 10.3–14.5)
RBC BLD AUTO: NORMAL — SIGNIFICANT CHANGE UP
RBC CASTS # UR COMP ASSIST: ABNORMAL /HPF (ref 0–4)
SARS-COV-2 RNA SPEC QL NAA+PROBE: SIGNIFICANT CHANGE UP
SODIUM SERPL-SCNC: 144 MMOL/L — SIGNIFICANT CHANGE UP (ref 135–145)
SP GR SPEC: 1.01 — SIGNIFICANT CHANGE UP (ref 1.01–1.02)
TROPONIN I SERPL-MCNC: <.015 NG/ML — SIGNIFICANT CHANGE UP (ref 0.01–0.04)
TSH SERPL-MCNC: 0.95 UU/ML — SIGNIFICANT CHANGE UP (ref 0.36–3.74)
UROBILINOGEN FLD QL: NEGATIVE MG/DL — SIGNIFICANT CHANGE UP
WBC # BLD: 3.69 K/UL — LOW (ref 3.8–10.5)
WBC # FLD AUTO: 3.69 K/UL — LOW (ref 3.8–10.5)
WBC UR QL: ABNORMAL

## 2020-05-07 PROCEDURE — 99285 EMERGENCY DEPT VISIT HI MDM: CPT

## 2020-05-07 PROCEDURE — 93010 ELECTROCARDIOGRAM REPORT: CPT

## 2020-05-07 PROCEDURE — 70551 MRI BRAIN STEM W/O DYE: CPT | Mod: 26

## 2020-05-07 PROCEDURE — 71045 X-RAY EXAM CHEST 1 VIEW: CPT | Mod: 26

## 2020-05-07 PROCEDURE — 99223 1ST HOSP IP/OBS HIGH 75: CPT

## 2020-05-07 PROCEDURE — 70450 CT HEAD/BRAIN W/O DYE: CPT | Mod: 26

## 2020-05-07 RX ORDER — ACETAMINOPHEN 500 MG
650 TABLET ORAL EVERY 6 HOURS
Refills: 0 | Status: DISCONTINUED | OUTPATIENT
Start: 2020-05-07 | End: 2020-05-11

## 2020-05-07 RX ORDER — METOPROLOL TARTRATE 50 MG
25 TABLET ORAL
Refills: 0 | Status: DISCONTINUED | OUTPATIENT
Start: 2020-05-07 | End: 2020-05-08

## 2020-05-07 RX ORDER — AZITHROMYCIN 500 MG/1
500 TABLET, FILM COATED ORAL ONCE
Refills: 0 | Status: COMPLETED | OUTPATIENT
Start: 2020-05-07 | End: 2020-05-07

## 2020-05-07 RX ORDER — SENNA PLUS 8.6 MG/1
2 TABLET ORAL AT BEDTIME
Refills: 0 | Status: DISCONTINUED | OUTPATIENT
Start: 2020-05-07 | End: 2020-05-11

## 2020-05-07 RX ORDER — HYDRALAZINE HCL 50 MG
10 TABLET ORAL ONCE
Refills: 0 | Status: COMPLETED | OUTPATIENT
Start: 2020-05-07 | End: 2020-05-07

## 2020-05-07 RX ORDER — CEFTRIAXONE 500 MG/1
1000 INJECTION, POWDER, FOR SOLUTION INTRAMUSCULAR; INTRAVENOUS ONCE
Refills: 0 | Status: COMPLETED | OUTPATIENT
Start: 2020-05-07 | End: 2020-05-07

## 2020-05-07 RX ADMIN — Medication 10 MILLIGRAM(S): at 19:12

## 2020-05-07 RX ADMIN — Medication 25 MILLIGRAM(S): at 19:23

## 2020-05-07 RX ADMIN — AZITHROMYCIN 255 MILLIGRAM(S): 500 TABLET, FILM COATED ORAL at 15:34

## 2020-05-07 RX ADMIN — CEFTRIAXONE 1000 MILLIGRAM(S): 500 INJECTION, POWDER, FOR SOLUTION INTRAMUSCULAR; INTRAVENOUS at 15:34

## 2020-05-07 RX ADMIN — CEFTRIAXONE 100 MILLIGRAM(S): 500 INJECTION, POWDER, FOR SOLUTION INTRAMUSCULAR; INTRAVENOUS at 15:11

## 2020-05-07 NOTE — ED PROVIDER NOTE - ATTENDING CONTRIBUTION TO CARE
agree with fabi kaplan,    in brief, 89f hx htn and period of ams. on exam, non verbal, staring blankly. ct head wnl. concern for tia, presently nih is 2.

## 2020-05-07 NOTE — H&P ADULT - DOES THIS PATIENT HAVE A HISTORY OF OR HAS BEEN DX WITH HEART FAILURE?
After Visit Summary   11/1/2018    Gem Jade    MRN: [id number]           Patient Information     Date Of Birth          1946        Visit Information        Provider Department      11/1/2018 3:30 PM Conchita Toledo, [facility]        Today's Diagnoses     Kidney replaced by transplant    -  1    Type 2 diabetes mellitus with diabetic polyneuropathy, with long-term current use of insulin (H)        Benign essential hypertension           Follow-ups after your visit        Your next 10 appointments already scheduled     Nov 12, 2018  3:30 PM CST   Telephone Visit with ANTHONY Antonio CNP   [facility] ([facility])    [address] [place] [id number] [phone]           Note: this is not an onsite visit; there is no need to come to the facility.            Dec 06, 2018  3:30 PM CST   TELEMEDICINE with Conchita Toledo [facility] ([facility])    [address] [place] [id number] [phone]           Note: this is not an onsite visit; there is no need to come to the facility.              Who to contact     If you have questions or need follow up information about today's clinic visit or your schedule please contact [facility] directly at [phone].  Normal or non-critical lab and imaging results will be communicated to you by MyChart, letter or phone within 4 business days after the clinic has received the results. If you do not hear from us within 7 days, please contact the clinic through MyChart or phone. If you have a critical or abnormal lab result, we will notify you by phone as soon as possible.  Submit refill requests through expresscoin or call your pharmacy and they will forward the refill request to  us. Please allow 3 business days for your refill to be completed.          Additional Information About Your Visit        MyChart Information     DTT gives you secure access to your electronic health record. If you see a primary care provider, you can also send messages to your care team and make appointments. If you have questions, please call your primary care clinic.  If you do not have a primary care provider, please call [phone] and they will assist you.        Care EveryWhere ID     This is your Care EveryWhere ID. This could be used by other organizations to access your [place] medical records  [id number]         Blood Pressure from Last 3 Encounters:   10/10/18 152/70   09/19/18 147/60   09/14/18 132/76    Weight from Last 3 Encounters:   09/17/18 223 lb 4.8 oz (101.3 kg)   07/08/18 223 lb 15.8 oz (101.6 kg)   06/01/18 232 lb 2.3 oz (105.3 kg)              Today, you had the following     No orders found for display         Today's Medication Changes          These changes are accurate as of 11/1/18  4:31 PM.  If you have any questions, ask your nurse or doctor.               These medicines have changed or have updated prescriptions.        Dose/Directions    hydrocortisone 1%/eucerin 1% compounded cream   This may have changed:    - when to take this  - reasons to take this   Used for:  Rash and nonspecific skin eruption        Apply topically 2 times daily   Quantity:  30 g   Refills:  1                Primary Care Provider Office Phone # Fax #    ANTHONY Antonio [facility] [phone]       [address] [place] [id number]        Goals        General    Medical (pt-stated)     Notes - Note created  7/11/2018  3:20 PM by Kassandra Khalil, RN    #1-Goal Statement: I will reduce the number of UTI's I experience  Measure of Success: reduction in number of UTI's  Supportive Steps to Achieve: home care nurse to request orders for more frequent catheter changes  and increased pericare per week  Barriers: resistant UTI's  Strengths: assistance as above  Date to Achieve By: 8-15-18          Equal Access to Services     CARLOS ENRIQUE GREEN : Marques Goodman, madhav dai, induchino deannavidjorje jimenez, jessica carrion emilianareggie mckeonblaine jorgehugocarlitos roman. So [facility] [phone].    ATENCIÓN: Si habla español, tiene a peguero disposición servicios gratuitos de asistencia lingüística. Llame al [phone].    We comply with applicable federal civil rights laws and [place] laws. We do not discriminate on the basis of race, color, national origin, age, disability, sex, sexual orientation, or gender identity.            Thank you!     Thank you for choosing [facility]  for your care. Our goal is always to provide you with excellent care. Hearing back from our patients is one way we can continue to improve our services. Please take a few minutes to complete the written survey that you may receive in the mail after your visit with us. Thank you!             Your Updated Medication List - Protect others around you: Learn how to safely use, store and throw away your medicines at www.disposemymeds.org.          This list is accurate as of 11/1/18  4:31 PM.  Always use your most recent med list.                   Brand Name Dispense Instructions for use Diagnosis    ACCU-CHEK COMPACT PLUS test strip   Generic drug:  blood glucose monitoring     102 each    USE TO TEST BLOOD SUGAR THREE TIMES A DAY OR AS DIRECTED    Diabetic polyneuropathy associated with type 2 diabetes mellitus (H)       ACETAMINOPHEN PO      Take 325-650 mg by mouth every 4 hours as needed.        aspirin 81 MG tablet      Take 81 mg by mouth daily        baclofen 10 MG tablet    LIORESAL    20 tablet    Take 1 tablet (10 mg) by mouth 2 times daily as needed for other (Bladder Spasms)    Urinary catheter (Johnson) change required       cefpodoxime 100 MG tablet    VANTIN    20 tablet    Take 1  tablet (100 mg) by mouth 2 times daily    Kidney transplanted, Urinary tract infection       Cranberry 400 MG Tabs      Take 400 mg by mouth 2 times daily        cyanocobalamin 1000 MCG/ML injection    VITAMIN B12    3 mL    Inject 1 mL (1,000 mcg) into the muscle every 30 days    Iron deficiency anemia, unspecified iron deficiency anemia type       famotidine 20 MG tablet    PEPCID    60 tablet    Take 1 tablet (20 mg) by mouth 2 times daily        gabapentin 300 MG capsule    NEURONTIN    360 capsule    Take 2 capsules (600 mg) by mouth 2 times daily May take an additional 300mg at bedtime daily with increase pain    Diabetic polyneuropathy associated with type 2 diabetes mellitus (H)       hydrocortisone 1%/eucerin 1% compounded cream     30 g    Apply topically 2 times daily    Rash and nonspecific skin eruption       insulin aspart 100 UNIT/ML injection    NovoLOG FLEXPEN    45 mL    Take 10 units with meals twice daily with sliding scale. Max 40 units per day    Type 2 diabetes mellitus with diabetic polyneuropathy, with long-term current use of insulin (H)       insulin glargine 100 UNIT/ML injection    LANTUS SOLOSTAR    45 mL    Inject 22 Units Subcutaneous At Bedtime    Type 2 diabetes mellitus with diabetic polyneuropathy, with long-term current use of insulin (H)       insulin pen needle 31G X 6 MM    COMFORT EZ PEN NEEDLES    100 each    Use 4 pen needles daily or as directed.    Type 2 diabetes mellitus (H)       labetalol 300 MG tablet    NORMODYNE    120 tablet    Take 0.5 tablets (150 mg) by mouth 2 times daily Take 1/2 tablet (150mg) =300mg. Hold for systolic BP less than 120.    Benign essential hypertension, Kidney replaced by transplant       lactobacillus rhamnosus (GG) capsule     60 capsule    Take 1 capsule by mouth 2 times daily    Diarrhea       levETIRAcetam 750 MG tablet    KEPPRA    60 tablet    Take 1 tablet (750 mg) by mouth 2 times daily        levothyroxine 50 MCG tablet     "SYNTHROID/LEVOTHROID    90 tablet    TAKE ONE TABLET BY MOUTH EVERY DAY    Other specified hypothyroidism       lidocaine 2 % topical gel    XYLOCAINE    30 mL    Apply topically as needed for moderate pain 10 ML every 3 weeks    Johnson catheter in place       loratadine 10 MG tablet    CLARITIN    90 tablet    Take 1 tablet (10 mg) by mouth daily    Acute nasopharyngitis       losartan 25 MG tablet    COZAAR    30 tablet    Take 0.5 tablets (12.5 mg) by mouth daily    Kidney replaced by transplant, Persistent proteinuria       ondansetron 4 MG ODT tab    ZOFRAN ODT    20 tablet    Take 1 tablet (4 mg) by mouth daily as needed for nausea 30 minutes before getting up in your wheelchair    Vertigo       order for DME     1 each    Equipment being ordered: [employer] gel-T cushion 20 inches wide & 18 inches deep  height 1.676 m (5' 6\"), weight 101.6 kg (223 lb 15.8 oz)    Back pain       order for DME     1 Box    Equipment being ordered: Tegaderm 4x4  MICHELLE 6 months Sacral wound    Decubitus ulcer of sacral region, unspecified ulcer stage       pravastatin 10 MG tablet    PRAVACHOL    90 tablet    Take 1 tablet (10 mg) by mouth daily    Hyperlipidemia LDL goal <100       predniSONE 5 MG tablet    DELTASONE    90 tablet    Take 1 tablet (5 mg) by mouth daily    Kidney replaced by transplant       simethicone 125 MG Chew chewable tablet    MYLICON    120 tablet    Take 1 tablet (125 mg) by mouth as needed for intestinal gas        * tacrolimus 1 MG capsule    GENERIC EQUIVALENT    120 capsule    Take 2 capsules (2 mg) by mouth 2 times daily    Kidney replaced by transplant       * tacrolimus 0.5 MG capsule    GENERIC EQUIVALENT    30 capsule    HOLD    Kidney replaced by transplant       VITAMIN D (CHOLECALCIFEROL) PO      Take 4,000 Units by mouth 2 times daily        * Notice:  This list has 2 medication(s) that are the same as other medications prescribed for you. Read the directions carefully, and ask your doctor or " other care provider to review them with you.       no

## 2020-05-07 NOTE — ED ADULT NURSE NOTE - OBJECTIVE STATEMENT
received er bed 1 biba from home per triage rn ems states pts family reported change in mental status pt alert and responsive but not speaking opens eyes to verbal conversation but doesn't answer questions no signs of pain noted generalized weakness noted no neuro deficits present no facial asymmetry noted lungs clear b/l

## 2020-05-07 NOTE — H&P ADULT - HISTORY OF PRESENT ILLNESS
89yo female with pmh of asthma, htn, and psych hx of defiance BIBA for AMS x 1 hour. As per family, patient became non verbal and stop reacting to verbal stimuli. Family told EMS patient has episodes similar to this when patient is depressed or angry. Daughter in law # [phone]

## 2020-05-07 NOTE — ED ADULT TRIAGE NOTE - CHIEF COMPLAINT QUOTE
pt presents to the ED with c/o AMS, as per medics hx of psychiatric hx with defiance and becoming non verbal, not ambulatory upon arrival to the home at this time not following commands, STROKE EVAL CALLED.  pt presents to the ED with c/o AMS, as per medics hx of psychiatric hx with defiance and becoming non verbal, not ambulatory upon arrival to the home at this time not following commands, STROKE EVAL CALLED. BGL  Daughter in law # [phone]

## 2020-05-07 NOTE — ED ADULT NURSE REASSESSMENT NOTE - NS ED NURSE REASSESS COMMENT FT1
pt much more alert and responsive verbal with clear speech dysphagia screening passed admission pending bp improved iv abx infusing without difficulty noted on monitor no distress noted

## 2020-05-07 NOTE — ED PROVIDER NOTE - CLINICAL SUMMARY MEDICAL DECISION MAKING FREE TEXT BOX
89yo pmh of htn, asthma and psych hx bibems for ams x1 hour. Despite hx of simialr episodes, given patient hypertensive to 224/100 with bradycardia - concerning for possible intracranial etiology. Will get sepsis labs, cxr, cth, rectal temp.

## 2020-05-07 NOTE — ED PROVIDER NOTE - OBJECTIVE STATEMENT
89yo female with pmh of asthma, htn, and psych hx of defiance BIBA for AMS x 1 hour. As per family, patient became non verbal and stop reacting to verbal stimuli. STROKE CATHLEENAL CALLED. BGL  Daughter in law # [phone] 89yo female with pmh of asthma, htn, and psych hx of defiance BIBA for AMS x 1 hour. As per family, patient became non verbal and stop reacting to verbal stimuli. Family told EMS patient has episodes similar to this when patient is depressed or angry. Daughter in law # [phone]

## 2020-05-07 NOTE — ED ADULT NURSE NOTE - CHIEF COMPLAINT QUOTE
pt presents to the ED with c/o AMS, as per medics hx of psychiatric hx with defiance and becoming non verbal, not ambulatory upon arrival to the home at this time not following commands, STROKE EVAL CALLED. BGL  Daughter in law # [phone]

## 2020-05-07 NOTE — ED ADULT TRIAGE NOTE - WEIGHT IN LBS
The patient has been re-examined and I agree with the above assessment or I updated with my findings.
190

## 2020-05-07 NOTE — H&P ADULT - ASSESSMENT
89 years old female with aMS WITH UTI AND PNA      IMPROVE VTE Individual Risk Assessment          RISK                                                          Points  [  ] Previous VTE                                                3  [  ] Thrombophilia                                             2  [  ] Lower limb paralysis                                   2        (unable to hold up >15 seconds)    [  ] Current Cancer                                             2         (within 6 months)  [  ] Immobilization > 24 hrs                              1  [  ] ICU/CCU stay > 24 hours                             1  [  ] Age > 60                                                         1    IMPROVE VTE Score: 2

## 2020-05-07 NOTE — ED ADULT NURSE NOTE - NSIMPLEMENTINTERV_GEN_ALL_ED
Implemented All Fall with Harm Risk Interventions:  [place] to call system. Call bell, personal items and telephone within reach. Instruct patient to call for assistance. Room bathroom lighting operational. Non-slip footwear when patient is off stretcher. Physically safe environment: no spills, clutter or unnecessary equipment. Stretcher in lowest position, wheels locked, appropriate side rails in place. Provide visual cue, wrist band, yellow gown, etc. Monitor gait and stability. Monitor for mental status changes and reorient to person, place, and time. Review medications for side effects contributing to fall risk. Reinforce activity limits and safety measures with patient and family. Provide visual clues: red socks.

## 2020-05-07 NOTE — H&P ADULT - NSHPPHYSICALEXAM_GEN_ALL_CORE
ICU Vital Signs Last 24 Hrs  T(C): 35.9 (07 May 2020 13:28), Max: 35.9 (07 May 2020 13:28)  T(F): 96.7 (07 May 2020 13:28), Max: 96.7 (07 May 2020 13:28)  HR: 74 (07 May 2020 15:05) (48 - 74)  BP: 160/64 (07 May 2020 15:05) (160/64 - 224/100)  BP(mean): --  ABP: --  ABP(mean): --  RR: 12 (07 May 2020 15:05) (12 - 18)  SpO2: 98% (07 May 2020 15:05) (94% - 98%)  GENERAL: NAD well-developed  HEAD:  Atraumatic, Normocephalic  EYES: EOMI, PERRLA, conjunctiva and sclera clear  ENMT: No tonsillar erythema, exudates, or enlargement; Moist mucous membranes, Good dentition, No lesions  NECK: Supple, No JVD, Normal thyroid  NERVOUS SYSTEM:  Alert & Oriented X3, Good concentration; Motor Strength 5/5 B/L upper and lower extremities; DTRs 2+ intact and symmetric  CHEST/LUNG: Clear to percussion bilaterally; No rales, rhonchi, wheezing, or rubs  HEART: Regular rate and rhythm; No murmurs, rubs, or gallops  ABDOMEN: Soft, Nontender, Nondistended; Bowel sounds present  EXTREMITIES:  2+ Peripheral Pulses, No clubbing, cyanosis, or edema  LYMPH: No lymphadenopathy   SKIN: No rashes or lesions

## 2020-05-07 NOTE — ED PROVIDER NOTE - PHYSICAL EXAMINATION
PE:   GEN: Awake, drowsy  HENT: NC/AT. Airway patent. Neck supple.   EYES: Clear b/l. PERRL  CARDIAC: RRR. S1, S2.   RESP: Normal respiratory effort with no use of accessory muscles or retractions. Clear throughout on auscultation.  ABD: soft, non-distended, non-tender. diaper on.  NEURO: Awake, not opening eyes to verbal sitmuli. Not following commands. Aphasic. Spontaneous movements of all 4 extremities with 2/5 strength throughout.   MSK: Moving all extremities with no apparent deformities.   SKIN: Warm, dry, intact normal color

## 2020-05-08 LAB
CULTURE RESULTS: NO GROWTH — SIGNIFICANT CHANGE UP
SPECIMEN SOURCE: SIGNIFICANT CHANGE UP

## 2020-05-08 PROCEDURE — 99233 SBSQ HOSP IP/OBS HIGH 50: CPT

## 2020-05-08 RX ORDER — HYDRALAZINE HCL 50 MG
10 TABLET ORAL ONCE
Refills: 0 | Status: COMPLETED | OUTPATIENT
Start: 2020-05-08 | End: 2020-05-08

## 2020-05-08 RX ORDER — AMLODIPINE BESYLATE 2.5 MG/1
5 TABLET ORAL DAILY
Refills: 0 | Status: DISCONTINUED | OUTPATIENT
Start: 2020-05-08 | End: 2020-05-11

## 2020-05-08 RX ORDER — ENOXAPARIN SODIUM 100 MG/ML
40 INJECTION SUBCUTANEOUS AT BEDTIME
Refills: 0 | Status: DISCONTINUED | OUTPATIENT
Start: 2020-05-08 | End: 2020-05-11

## 2020-05-08 RX ORDER — HYDRALAZINE HCL 50 MG
25 TABLET ORAL ONCE
Refills: 0 | Status: COMPLETED | OUTPATIENT
Start: 2020-05-08 | End: 2020-05-08

## 2020-05-08 RX ORDER — HYDRALAZINE HCL 50 MG
25 TABLET ORAL THREE TIMES A DAY
Refills: 0 | Status: DISCONTINUED | OUTPATIENT
Start: 2020-05-08 | End: 2020-05-11

## 2020-05-08 RX ADMIN — Medication 25 MILLIGRAM(S): at 13:54

## 2020-05-08 RX ADMIN — Medication 10 MILLIGRAM(S): at 06:18

## 2020-05-08 RX ADMIN — AMLODIPINE BESYLATE 5 MILLIGRAM(S): 2.5 TABLET ORAL at 17:45

## 2020-05-08 RX ADMIN — SENNA PLUS 2 TABLET(S): 8.6 TABLET ORAL at 04:50

## 2020-05-08 RX ADMIN — ENOXAPARIN SODIUM 40 MILLIGRAM(S): 100 INJECTION SUBCUTANEOUS at 21:18

## 2020-05-08 RX ADMIN — Medication 1 TABLET(S): at 18:55

## 2020-05-08 NOTE — PROGRESS NOTE ADULT - SUBJECTIVE AND OBJECTIVE BOX
Patient is a 89y old  Female who presents with a chief complaint of ams (07 May 2020 15:47)    INTERVAL HPI/ OVERNIGHT EVENTS: Pt was seen and examined at bedside today.  No significant overnight events.  Has been verbal/communicative/appropriate with staff.  BP was up this AM (197/85), as pt did not get her BP Rx out of concerns of bradycardia.  BP improved after hydralazine 25 mg x1.    Allergies  No Known Allergies    MEDICATIONS  (STANDING):  senna 2 Tablet(s) Oral at bedtime    MEDICATIONS  (PRN):  acetaminophen   Tablet .. 650 milliGRAM(s) Oral every 6 hours PRN Temp greater or equal to 38C (100.4F), Moderate Pain (4 - 6)    REVIEW OF SYSTEMS:  CONSTITUTIONAL: No fever, no generalized weakness/fatigue  EYES: No visual disturbances  ENMT: No difficulty hearing  NECK: No pain  RESPIRATORY: No shortness of breath, no cough  CARDIOVASCULAR: No chest pain, palpitations  GASTROINTESTINAL: No abdominal pain.  No nausea, vomiting, diarrhea or constipation.  GENITOURINARY: No dysuria, frequency or incontinence  NEUROLOGICAL: No headaches, dizziness, loss of strength, numbness  SKIN: No rashes or lesions   MUSCULOSKELETAL: No joint pain or swelling  PSYCHIATRIC: No difficulty sleeping    Vital Signs Last 24 Hrs  T(C): 36.8 (08 May 2020 10:14), Max: 36.8 (08 May 2020 10:14)  T(F): 98.2 (08 May 2020 10:14), Max: 98.2 (08 May 2020 10:14)  HR: 88 (08 May 2020 10:14) (50 - 88)  BP: 132/49 (08 May 2020 10:14) (132/49 - 213/76)  RR: 18 (08 May 2020 10:14) (12 - 18)  SpO2: 96% (08 May 2020 10:14) (94% - 99%)    PHYSICAL EXAM:  GENERAL: NAD, appears comfortable, pleasant, cooperative  HEAD: Atraumatic, normocephalic  EYES: Conjunctivae and sclerae clear  ENMT: Moist mucous membranes  NECK: No JVD  CHEST/LUNG: Clear to auscultation bilaterally; no rales, rhonchi, wheezing, or rubs  HEART: Regular rate and rhythm; no murmurs, rubs, or gallops  ABDOMEN: Soft, nontender, nondistended; bowel sounds present  EXTREMITIES: Calves NTTP bilateral; no cyanosis or edema x 4 distal limbs  SKIN: No rashes or lesions  NERVOUS SYSTEM: Alert, grossly cognitively intact, oriented to "hospital" (not sure which), "April" "" "Tr."  No understanding why she is in the hospital.  Nonaphasic/normal speech.  Motor: MONTESINOS well, grossly normal strength.  SILT x 4 distal extremities.  Quite sensitive to light touch and light palpation throughout all 4 limbs.    LABS:                        12.8   3.69  )-----------( 150      ( 07 May 2020 14:04 )             39.0     05-    144  |  109<H>  |  30<H>  ----------------------------<  112<H>  4.4   |  28  |  0.84    Ca    8.8      07 May 2020 14:04    TPro  7.9  /  Alb  3.5  /  TBili  0.4  /  DBili  x   /  AST  18  /  ALT  19  /  AlkPhos  71      PT/INR - ( 07 May 2020 14:04 )   PT: 11.1 sec;   INR: 0.99 ratio    PTT - ( 07 May 2020 14:04 )  PTT:27.5 sec    Urinalysis Basic - ( 07 May 2020 14:34 )  Color: Yellow / Appearance: Clear / S.010 / pH: x  Gluc: x / Ketone: Negative  / Bili: Negative / Urobili: Negative mg/dL   Blood: x / Protein: 30 mg/dL / Nitrite: Negative   Leuk Esterase: Moderate / RBC: 6-10 /HPF / WBC 11-25   Sq Epi: x / Non Sq Epi: Few / Bacteria: Moderate      RADIOLOGY & ADDITIONAL TESTS:    < from: MR Head No Cont (20 @ 16:25) > IMPRESSION:  No acute intracranial findings.  Mild-to-moderate chronic microvascular ischemic disease.  < end of copied text >    < from: CT Head No Cont (20 @ 14:00) >  IMPRESSION:  1)  chronic ischemic changes noted in both hemispheres with volume loss. No obvious acute infarct or hemorrhage.  2)  chronic atherosclerotic changes noted intracranially with no gross intraluminal thrombus.  < end of copied text >    < from: Xray Chest 1 View- PORTABLE-Urgent (20 @ 14:30) >  IMPRESSION:  Patchy right basilar opacity secondary to atelectasis or infiltrate. Mild cardiomegaly. < end of copied text >    Imaging Personally Reviewed:  [ ] YES  [x] NO      Consultant(s) Notes Reviewed:  [x] YES  [ ] NO    Care Discussed with Consultants/Other Providers [ ] YES  [ ] NO

## 2020-05-08 NOTE — PROGRESS NOTE ADULT - ASSESSMENT
89 y/o woman with PMH of asthma, HTN (previously on BP Rx, but stopped by PCP as per family, because she apparently did not need it), depression in past (but off of Rx).  Admitted to [facility] on 5/7/2020 due to acute mental status changes.  Per family, pt was not feeling well, then became nonverbal and not verbally responsive at home.  No syncope or seizure.  W/u showed +UA/UTI and patchy R basilar infiltrate on CXR, otherwise generally unrevealing.  CTH and MRI negative.  COVID negative.    HTN:  Hemodynamically stable after BP meds given.  - Will start amlodipine 5 mg daily, prn hydralazine.  - Continue to monitor and titrate Rx prn.    UTI:  - Start abx.    H/o depression:  Not on Rx at baseline (was on in the past, stopped for unclear reasons)  Currently stable.  - Monitor here.  For outpatient f/u prn.    F/E/N:  - DASH diet.    Ppx:  - VTE ppx: LMWH (enoxaparin 40mg qd) until more mobile.    Mobility / Disposition planning:  Lives with son and daughter in law.  Spoke with family on phone at length 5/8.  Results, plan discussed.  All questions answered.  Family to try to talk with pt over phone today to assess her cognitive status.  Possible return home tomorrow if stable.

## 2020-05-09 LAB
ANION GAP SERPL CALC-SCNC: 7 MMOL/L — SIGNIFICANT CHANGE UP (ref 5–17)
BUN SERPL-MCNC: 23 MG/DL — SIGNIFICANT CHANGE UP (ref 7–23)
CALCIUM SERPL-MCNC: 8.4 MG/DL — LOW (ref 8.5–10.1)
CHLORIDE SERPL-SCNC: 107 MMOL/L — SIGNIFICANT CHANGE UP (ref 96–108)
CO2 SERPL-SCNC: 27 MMOL/L — SIGNIFICANT CHANGE UP (ref 22–31)
CREAT SERPL-MCNC: 0.93 MG/DL — SIGNIFICANT CHANGE UP (ref 0.5–1.3)
GLUCOSE SERPL-MCNC: 84 MG/DL — SIGNIFICANT CHANGE UP (ref 70–99)
HCT VFR BLD CALC: 35.9 % — SIGNIFICANT CHANGE UP (ref 34.5–45)
HGB BLD-MCNC: 11.3 G/DL — LOW (ref 11.5–15.5)
MCHC RBC-ENTMCNC: 28.5 PG — SIGNIFICANT CHANGE UP (ref 27–34)
MCHC RBC-ENTMCNC: 31.5 GM/DL — LOW (ref 32–36)
MCV RBC AUTO: 90.7 FL — SIGNIFICANT CHANGE UP (ref 80–100)
NRBC # BLD: 0 /100 WBCS — SIGNIFICANT CHANGE UP (ref 0–0)
PLATELET # BLD AUTO: 134 K/UL — LOW (ref 150–400)
POTASSIUM SERPL-MCNC: 3.9 MMOL/L — SIGNIFICANT CHANGE UP (ref 3.5–5.3)
POTASSIUM SERPL-SCNC: 3.9 MMOL/L — SIGNIFICANT CHANGE UP (ref 3.5–5.3)
RBC # BLD: 3.96 M/UL — SIGNIFICANT CHANGE UP (ref 3.8–5.2)
RBC # FLD: 14.4 % — SIGNIFICANT CHANGE UP (ref 10.3–14.5)
SODIUM SERPL-SCNC: 141 MMOL/L — SIGNIFICANT CHANGE UP (ref 135–145)
WBC # BLD: 5.38 K/UL — SIGNIFICANT CHANGE UP (ref 3.8–10.5)
WBC # FLD AUTO: 5.38 K/UL — SIGNIFICANT CHANGE UP (ref 3.8–10.5)

## 2020-05-09 PROCEDURE — 99232 SBSQ HOSP IP/OBS MODERATE 35: CPT

## 2020-05-09 RX ADMIN — Medication 1 TABLET(S): at 05:47

## 2020-05-09 RX ADMIN — ENOXAPARIN SODIUM 40 MILLIGRAM(S): 100 INJECTION SUBCUTANEOUS at 21:10

## 2020-05-09 RX ADMIN — AMLODIPINE BESYLATE 5 MILLIGRAM(S): 2.5 TABLET ORAL at 05:47

## 2020-05-09 RX ADMIN — SENNA PLUS 2 TABLET(S): 8.6 TABLET ORAL at 21:10

## 2020-05-09 RX ADMIN — Medication 1 TABLET(S): at 17:00

## 2020-05-09 NOTE — PROGRESS NOTE ADULT - SUBJECTIVE AND OBJECTIVE BOX
Patient is a 89y old  Female who presents with a chief complaint of ams (07 May 2020 15:47)    INTERVAL HPI/ OVERNIGHT EVENTS: Pt was seen and examined at bedside today.  No significant overnight events.  Remains verbal/communicative/appropriate with staff.  Pt's son spoke with pt yesterday PM and son feels pt is fine and at baseline.  She complains of frequency, but no dysuria.  Nsg noted that she has been incontinent of bladder and placed her on a Primafit.  She has not had a BM, but is passing gas.     Allergies  No Known Allergies    MEDICATIONS  (STANDING):  amLODIPine   Tablet 5 milliGRAM(s) Oral daily  enoxaparin Injectable 40 milliGRAM(s) SubCutaneous at bedtime  senna 2 Tablet(s) Oral at bedtime  trimethoprim  160 mG/sulfamethoxazole 800 mG 1 Tablet(s) Oral two times a day    MEDICATIONS  (PRN):  acetaminophen   Tablet .. 650 milliGRAM(s) Oral every 6 hours PRN Temp greater or equal to 38C (100.4F), Moderate Pain (4 - 6)  hydrALAZINE 25 milliGRAM(s) Oral three times a day PRN Systolic blood pressure > 160    REVIEW OF SYSTEMS:  CONSTITUTIONAL: No fever, no generalized weakness/fatigue  EYES: No visual disturbances  RESPIRATORY: No shortness of breath, no cough  CARDIOVASCULAR: No chest pain, palpitations  GASTROINTESTINAL: No abdominal pain.  No nausea, vomiting, diarrhea or constipation.  GENITOURINARY: No dysuria.  +Frequency and incontinence  NEUROLOGICAL: No headaches, dizziness, loss of strength, numbness  SKIN: No rashes or lesions   MUSCULOSKELETAL: No joint pain or swelling  PSYCHIATRIC: No difficulty sleeping    Vital Signs Last 24 Hrs  T(C): 36.8 (09 May 2020 10:51), Max: 37.1 (08 May 2020 20:47)  T(F): 98.2 (09 May 2020 10:51), Max: 98.7 (08 May 2020 20:47)  HR: 76 (09 May 2020 13:30) (72 - 86)  BP: 120/45 (09 May 2020 10:51) (120/45 - 173/60)  RR: 18 (09 May 2020 10:51) (18 - 18)  SpO2: 97% (09 May 2020 10:51) (97% - 97%)    PHYSICAL EXAM:  GENERAL: NAD, appears comfortable, pleasant, cooperative  HEAD: Atraumatic, normocephalic  EYES: Conjunctivae and sclerae clear  ENMT: Moist mucous membranes  NECK: No JVD  CHEST/LUNG: Clear to auscultation bilaterally; no rales, rhonchi, wheezing, or rubs  HEART: Regular rate and rhythm; no murmurs, rubs, or gallops  ABDOMEN: Soft, nontender, nondistended; bowel sounds present  EXTREMITIES: Calves NTTP bilateral; no cyanosis or edema x 4 distal limbs  SKIN: No rashes or lesions  NERVOUS SYSTEM: Alert, grossly cognitively intact, oriented to "hospital" (not sure which), unsure what month, maybe " Nonaphasic/normal speech.  Motor: MONTESINOS well, grossly normal strength.    LABS:               11.3   5.38  )-----------( 134      ( 09 May 2020 06:53 )             35.9                12.8   3.69  )-----------( 150      ( 07 May 2020 14:04 )             39.0     05-09    141  |  107  |  23  ----------------------------<  84  3.9   |  27  |  0.93    05-07    144  |  109<H>  |  30<H>  ----------------------------<  112<H>  4.4   |  28  |  0.84    Ca    8.4<L>      09 May 2020 06:53  Ca    8.8      07 May 2020 14:04    TPro  7.9  /  Alb  3.5  /  TBili  0.4  /  DBili  x   /  AST  18  /  ALT  19  /  AlkPhos  71  05-07    PT/INR - ( 07 May 2020 14:04 )   PT: 11.1 sec;   INR: 0.99 ratio    PTT - ( 07 May 2020 14:04 )  PTT:27.5 sec    Urinalysis Basic - ( 07 May 2020 14:34 )  Color: Yellow / Appearance: Clear / S.010 / pH: x  Gluc: x / Ketone: Negative  / Bili: Negative / Urobili: Negative mg/dL   Blood: x / Protein: 30 mg/dL / Nitrite: Negative   Leuk Esterase: Moderate / RBC: 6-10 /HPF / WBC 11-25   Sq Epi: x / Non Sq Epi: Few / Bacteria: Moderate    Culture - Urine (20 @ 18:13)    Specimen Source: .Urine Clean Catch (Midstream)    Culture Results: No growth      RADIOLOGY & ADDITIONAL TESTS:    < from: MR Head No Cont (20 @ 16:25) > IMPRESSION:  No acute intracranial findings.  Mild-to-moderate chronic microvascular ischemic disease.  < end of copied text >    < from: CT Head No Cont (20 @ 14:00) >  IMPRESSION:  1)  chronic ischemic changes noted in both hemispheres with volume loss. No obvious acute infarct or hemorrhage.  2)  chronic atherosclerotic changes noted intracranially with no gross intraluminal thrombus.  < end of copied text >    < from: Xray Chest 1 View- PORTABLE-Urgent (20 @ 14:30) >  IMPRESSION:  Patchy right basilar opacity secondary to atelectasis or infiltrate. Mild cardiomegaly. < end of copied text >    Imaging Personally Reviewed:  [ ] YES  [x] NO      Consultant(s) Notes Reviewed:  [x] YES  [ ] NO    Care Discussed with Consultants/Other Providers [ ] YES  [ ] NO

## 2020-05-09 NOTE — PROGRESS NOTE ADULT - ASSESSMENT
89 y/o woman with PMH of asthma, HTN (previously on BP Rx, but stopped by PCP as per family, because she apparently did not need it), depression in past (but off of Rx).  Admitted to [facility] on 5/7/2020 due to acute mental status changes.  Per family, pt was not feeling well, then became nonverbal and not verbally responsive at home.  Has done this before reportedly, when angry, although did not seem to be the case per se this time.  No syncope or seizure.  CTH and MRI brain negative.  W/u otherwise showed +UA/UTI and patchy R basilar infiltrate on CXR, otherwise generally unrevealing.  Received IV Abx in ED.  Has been afebrile, aleucocytotic and no signs of systemic infection.  COVID negative.    HTN:  Was previously on antihypertensives, but taken off by her outpatient doctor a while back:  SBP >200 in ED.  Hemodynamically stable after BP meds given.  - Continue amlodipine 5 mg daily, prn hydralazine.  - Continue to monitor and titrate Rx prn.  Expected to need low dose antihypertensives for home; re-eval by PCP.    UTI:  - Given positive urinalysis and symptoms, will complete short course of abx, despite negative culture.    H/o depression:  Not on Rx at baseline (was on in the past, stopped for unclear reasons), currently stable.  - Monitor here.  For outpatient f/u prn.    F/E/N:  - DASH diet.    Ppx:  - VTE ppx: LMWH (enoxaparin 40mg qd) while hospitalized..    Mobility / Disposition planning:  Lives with son and daughter in law.  Baseline function is slow but stable household ambulator without an assistive device; walks with walker outside of the house.  Spoke with family on phone at length on 5/8 and briefly on 5/9.  Results, plan discussed.  All questions answered.  Pt's family feels she is cognitively intact.  Await PT eval, likely d/c home next 1-2 days with home services after cleared by PT.

## 2020-05-10 LAB
ANION GAP SERPL CALC-SCNC: 8 MMOL/L — SIGNIFICANT CHANGE UP (ref 5–17)
BUN SERPL-MCNC: 30 MG/DL — HIGH (ref 7–23)
CALCIUM SERPL-MCNC: 8.6 MG/DL — SIGNIFICANT CHANGE UP (ref 8.5–10.1)
CHLORIDE SERPL-SCNC: 109 MMOL/L — HIGH (ref 96–108)
CO2 SERPL-SCNC: 26 MMOL/L — SIGNIFICANT CHANGE UP (ref 22–31)
CREAT SERPL-MCNC: 1.51 MG/DL — HIGH (ref 0.5–1.3)
GLUCOSE SERPL-MCNC: 93 MG/DL — SIGNIFICANT CHANGE UP (ref 70–99)
HCT VFR BLD CALC: 34.9 % — SIGNIFICANT CHANGE UP (ref 34.5–45)
HGB BLD-MCNC: 10.9 G/DL — LOW (ref 11.5–15.5)
MCHC RBC-ENTMCNC: 28.5 PG — SIGNIFICANT CHANGE UP (ref 27–34)
MCHC RBC-ENTMCNC: 31.2 GM/DL — LOW (ref 32–36)
MCV RBC AUTO: 91.4 FL — SIGNIFICANT CHANGE UP (ref 80–100)
NRBC # BLD: 0 /100 WBCS — SIGNIFICANT CHANGE UP (ref 0–0)
PLATELET # BLD AUTO: 132 K/UL — LOW (ref 150–400)
POTASSIUM SERPL-MCNC: 3.9 MMOL/L — SIGNIFICANT CHANGE UP (ref 3.5–5.3)
POTASSIUM SERPL-SCNC: 3.9 MMOL/L — SIGNIFICANT CHANGE UP (ref 3.5–5.3)
RBC # BLD: 3.82 M/UL — SIGNIFICANT CHANGE UP (ref 3.8–5.2)
RBC # FLD: 14.7 % — HIGH (ref 10.3–14.5)
SODIUM SERPL-SCNC: 143 MMOL/L — SIGNIFICANT CHANGE UP (ref 135–145)
WBC # BLD: 5.74 K/UL — SIGNIFICANT CHANGE UP (ref 3.8–10.5)
WBC # FLD AUTO: 5.74 K/UL — SIGNIFICANT CHANGE UP (ref 3.8–10.5)

## 2020-05-10 PROCEDURE — 99233 SBSQ HOSP IP/OBS HIGH 50: CPT

## 2020-05-10 RX ADMIN — Medication 1 TABLET(S): at 05:29

## 2020-05-10 RX ADMIN — SENNA PLUS 2 TABLET(S): 8.6 TABLET ORAL at 21:47

## 2020-05-10 RX ADMIN — ENOXAPARIN SODIUM 40 MILLIGRAM(S): 100 INJECTION SUBCUTANEOUS at 21:47

## 2020-05-10 RX ADMIN — Medication 1 TABLET(S): at 18:03

## 2020-05-10 RX ADMIN — AMLODIPINE BESYLATE 5 MILLIGRAM(S): 2.5 TABLET ORAL at 05:29

## 2020-05-10 NOTE — PHYSICAL THERAPY INITIAL EVALUATION ADULT - PERTINENT HX OF CURRENT PROBLEM, REHAB EVAL
Pt admitted 5/7 due to AMS and brief episode of nonverbal and unresponsive to verbal cues. Pt was AMS for 1 hour prior to EMS bringing pt to ED.

## 2020-05-10 NOTE — PHYSICAL THERAPY INITIAL EVALUATION ADULT - TRANSFER TRAINING, PT EVAL
Pt will perform sit to stand and bed to chair transfers independently c Rolling Walker by 6-8 weeks.

## 2020-05-10 NOTE — PHYSICAL THERAPY INITIAL EVALUATION ADULT - BED MOBILITY TRAINING, PT EVAL
Pt will perform all tasks of bed mobility independently within 6-8 weeks to prevent pressure ulcers and deconditioning

## 2020-05-10 NOTE — PROGRESS NOTE ADULT - SUBJECTIVE AND OBJECTIVE BOX
Patient is a 89y old  Female who presents with a chief complaint of ams (09 May 2020 16:12)      INTERVAL HPI/OVERNIGHT EVENTS:  No noted events overnight.  Pt found in bed in NAD.  no c/o.  no CP, dyspnea, GI sx.  Feels okay.    MEDICATIONS  (STANDING):  amLODIPine   Tablet 5 milliGRAM(s) Oral daily  enoxaparin Injectable 40 milliGRAM(s) SubCutaneous at bedtime  senna 2 Tablet(s) Oral at bedtime  trimethoprim  160 mG/sulfamethoxazole 800 mG 1 Tablet(s) Oral two times a day    MEDICATIONS  (PRN):  acetaminophen   Tablet .. 650 milliGRAM(s) Oral every 6 hours PRN Temp greater or equal to 38C (100.4F), Moderate Pain (4 - 6)  hydrALAZINE 25 milliGRAM(s) Oral three times a day PRN Systolic blood pressure > 160      Allergies    No Known Allergies    Intolerances        Vital Signs Last 24 Hrs  T(C): 36.8 (10 May 2020 10:51), Max: 37.4 (09 May 2020 16:15)  T(F): 98.2 (10 May 2020 10:51), Max: 99.4 (09 May 2020 16:15)  HR: 86 (10 May 2020 10:51) (76 - 100)  BP: 105/58 (10 May 2020 10:51) (97/51 - 131/62)  BP(mean): --  RR: 18 (10 May 2020 10:51) (17 - 18)  SpO2: 96% (10 May 2020 10:51) (92% - 98%)    PHYSICAL EXAM:  GENERAL: NAD, cooperative, interactive  HEAD:  Atraumatic, Normocephalic  NERVOUS SYSTEM:  alert, grossly oriented, moving all ext  CHEST/LUNG: Clear to percussion bilaterally; No rales, rhonchi, wheezing, or rubs  HEART: Regular rate and rhythm; No murmurs, rubs, or gallops  ABDOMEN: Soft, Nontender, Nondistended; Bowel sounds present  EXTREMITIES:  + Peripheral Pulses, No edema    LABS:                        10.9   5.74  )-----------( 132      ( 10 May 2020 06:46 )             34.9     05-10    143  |  109<H>  |  30<H>  ----------------------------<  93  3.9   |  26  |  1.51<H>    Ca    8.6      10 May 2020 06:46    Culture - Urine (05.07.20 @ 18:13)    Specimen Source: .Urine Clean Catch (Midstream)    Culture Results:   No growth          CAPILLARY BLOOD GLUCOSE          RADIOLOGY & ADDITIONAL TESTS:    Imaging Personally Reviewed:  [ X] YES  [ ] NO    Consultant(s) Notes Reviewed:  [ X] YES  [ ] NO    Care Discussed with Consultants/Other Providers [X ] YES  [ ] NO

## 2020-05-10 NOTE — PHYSICAL THERAPY INITIAL EVALUATION ADULT - ADDITIONAL COMMENTS
As per daughter-in-law Jo via phone call. Pt lives with son and daughter-in-law in a private house c 4 steps to enter with bilat rails (can be used simultaneously). Pt bedroom and bathroom located on the main floor of the home. Prior to admission, pt was independent c toileting and feeding, however required assistance for bathing provided by family. Pt ambulates with straight cane or rolling walker for community ambulation, and ambulate slowly within the home s AD.

## 2020-05-10 NOTE — PHYSICAL THERAPY INITIAL EVALUATION ADULT - GENERAL OBSERVATIONS, REHAB EVAL
Pt encountered supine in bed, A&Ox4, denies pain and discomfort, NAD and comfortable. Pt follows commands 100% of time, however requires cues for sequencing with tasks.

## 2020-05-10 NOTE — PHYSICAL THERAPY INITIAL EVALUATION ADULT - CRITERIA FOR SKILLED THERAPEUTIC INTERVENTIONS
impairments found/therapy frequency/anticipated discharge recommendation/functional limitations in following categories/rehab potential/risk reduction/prevention

## 2020-05-10 NOTE — PROGRESS NOTE ADULT - ASSESSMENT
89 y/o woman with PMH of asthma, HTN (previously on BP Rx, but stopped by PCP as per family, because she apparently did not need it), depression in past (but off of Rx).  Admitted to [facility] on 5/7/2020 due to acute mental status changes.  Per family, pt was not feeling well, then became nonverbal and not verbally responsive at home.  Has done this before reportedly, when angry, although did not seem to be the case per se this time.  No syncope or seizure.  CTH and MRI brain negative.  W/u otherwise showed +UA/UTI and patchy R basilar infiltrate on CXR, otherwise generally unrevealing.  Received IV Abx in ED.  Has been afebrile, aleucocytotic and no signs of systemic infection.  COVID negative.    HTN:  Was previously on antihypertensives, but taken off by her outpatient doctor a while back:  SBP >200 in ED.  Hemodynamically stable after BP meds given.  - Continue amlodipine 5 mg daily, prn hydralazine.  - Continue to monitor and titrate Rx prn.  Expected to need low dose antihypertensives for home; re-eval by PCP.    UTI:  - Given positive urinalysis and symptoms, will complete short course of abx, despite negative culture - written for total of 7 days bactrim.    H/o depression:  Not on Rx at baseline (was on in the past, stopped for unclear reasons), currently stable.  - Monitor here.  For outpatient f/u prn.    F/E/N:  - DASH diet.    Ppx:  - VTE ppx: LMWH (enoxaparin 40mg qd) while hospitalized..    Mobility / Disposition planning:  Lives with son and daughter in law.  Baseline function is slow but stable household ambulator without an assistive device; walks with walker outside of the house.  Spoke with family on phone at length on 5/8 and briefly on 5/9.  Results, plan discussed.  All questions answered.  Pt's family feels she is cognitively intact.    Await PT eval, likely d/c home next 1 days with home services after cleared by PT.

## 2020-05-10 NOTE — PHYSICAL THERAPY INITIAL EVALUATION ADULT - SIT-TO-STAND BALANCE
Continue: latanoprost (latanoprost): drops: 0.005% 1 drop at bedtime as directed into both eyes 07- Rolling Walker/fair balance

## 2020-05-10 NOTE — PHYSICAL THERAPY INITIAL EVALUATION ADULT - GAIT DEVIATIONS NOTED, PT EVAL
decreased step length/decreased chas/decreased velocity of limb motion/decreased weight-shifting ability/decreased stride length/increased time in double stance

## 2020-05-11 ENCOUNTER — TRANSCRIPTION ENCOUNTER (OUTPATIENT)
Age: 85
End: 2020-05-11

## 2020-05-11 VITALS
SYSTOLIC BLOOD PRESSURE: 118 MMHG | DIASTOLIC BLOOD PRESSURE: 90 MMHG | OXYGEN SATURATION: 98 % | HEART RATE: 77 BPM | TEMPERATURE: 98 F | RESPIRATION RATE: 18 BRPM

## 2020-05-11 LAB
ANION GAP SERPL CALC-SCNC: 7 MMOL/L — SIGNIFICANT CHANGE UP (ref 5–17)
BUN SERPL-MCNC: 26 MG/DL — HIGH (ref 7–23)
CALCIUM SERPL-MCNC: 8.5 MG/DL — SIGNIFICANT CHANGE UP (ref 8.5–10.1)
CHLORIDE SERPL-SCNC: 107 MMOL/L — SIGNIFICANT CHANGE UP (ref 96–108)
CO2 SERPL-SCNC: 27 MMOL/L — SIGNIFICANT CHANGE UP (ref 22–31)
CREAT SERPL-MCNC: 1.12 MG/DL — SIGNIFICANT CHANGE UP (ref 0.5–1.3)
GLUCOSE SERPL-MCNC: 96 MG/DL — SIGNIFICANT CHANGE UP (ref 70–99)
HCT VFR BLD CALC: 35.3 % — SIGNIFICANT CHANGE UP (ref 34.5–45)
HGB BLD-MCNC: 11.4 G/DL — LOW (ref 11.5–15.5)
MCHC RBC-ENTMCNC: 28.7 PG — SIGNIFICANT CHANGE UP (ref 27–34)
MCHC RBC-ENTMCNC: 32.3 GM/DL — SIGNIFICANT CHANGE UP (ref 32–36)
MCV RBC AUTO: 88.9 FL — SIGNIFICANT CHANGE UP (ref 80–100)
NRBC # BLD: 0 /100 WBCS — SIGNIFICANT CHANGE UP (ref 0–0)
PLATELET # BLD AUTO: 129 K/UL — LOW (ref 150–400)
POTASSIUM SERPL-MCNC: 4.1 MMOL/L — SIGNIFICANT CHANGE UP (ref 3.5–5.3)
POTASSIUM SERPL-SCNC: 4.1 MMOL/L — SIGNIFICANT CHANGE UP (ref 3.5–5.3)
RBC # BLD: 3.97 M/UL — SIGNIFICANT CHANGE UP (ref 3.8–5.2)
RBC # FLD: 14.9 % — HIGH (ref 10.3–14.5)
SODIUM SERPL-SCNC: 141 MMOL/L — SIGNIFICANT CHANGE UP (ref 135–145)
WBC # BLD: 5.29 K/UL — SIGNIFICANT CHANGE UP (ref 3.8–10.5)
WBC # FLD AUTO: 5.29 K/UL — SIGNIFICANT CHANGE UP (ref 3.8–10.5)

## 2020-05-11 PROCEDURE — 99239 HOSP IP/OBS DSCHRG MGMT >30: CPT

## 2020-05-11 RX ORDER — ACETAMINOPHEN 500 MG
2 TABLET ORAL
Qty: 0 | Refills: 0 | DISCHARGE
Start: 2020-05-11

## 2020-05-11 RX ORDER — AMLODIPINE BESYLATE 2.5 MG/1
1 TABLET ORAL
Qty: 30 | Refills: 0
Start: 2020-05-11 | End: 2020-06-09

## 2020-05-11 RX ORDER — ASPIRIN/CALCIUM CARB/MAGNESIUM 324 MG
1 TABLET ORAL
Qty: 0 | Refills: 0 | DISCHARGE

## 2020-05-11 RX ORDER — SENNA PLUS 8.6 MG/1
2 TABLET ORAL
Qty: 8 | Refills: 0
Start: 2020-05-11 | End: 2020-05-14

## 2020-05-11 RX ORDER — HYDRALAZINE HCL 50 MG
1 TABLET ORAL
Qty: 90 | Refills: 0
Start: 2020-05-11 | End: 2020-06-09

## 2020-05-11 RX ADMIN — Medication 1 TABLET(S): at 17:15

## 2020-05-11 RX ADMIN — AMLODIPINE BESYLATE 5 MILLIGRAM(S): 2.5 TABLET ORAL at 05:30

## 2020-05-11 RX ADMIN — Medication 1 TABLET(S): at 05:30

## 2020-05-11 NOTE — DISCHARGE NOTE PROVIDER - NSDCMRMEDTOKEN_GEN_ALL_CORE_FT
acetaminophen 325 mg oral tablet: 2 tab(s) orally every 6 hours, As needed, Temp greater or equal to 38C (100.4F), Moderate Pain (4 - 6)  amLODIPine 5 mg oral tablet: 1 tab(s) orally once a day  dilTIAZem 120 mg/24 hours oral capsule, extended release: 1 cap(s) orally once a day  DuoNeb 0.5 mg-2.5 mg/3 mL inhalation solution: 3 milliliter(s) inhaled 4 times a day [facility]  hydrALAZINE 25 mg oral tablet: 1 tab(s) orally 3 times a day, As needed, Systolic blood pressure &gt; 160  senna oral tablet: 2 tab(s) orally once a day (at bedtime)  sulfamethoxazole-trimethoprim 800 mg-160 mg oral tablet: 1 tab(s) orally 2 times a day  tiotropium 18 mcg inhalation capsule: 1 cap(s) inhaled once a day

## 2020-05-11 NOTE — DISCHARGE NOTE PROVIDER - HOSPITAL COURSE
89 y/o woman with PMH of asthma, HTN (previously on BP Rx, but stopped by PCP as per family, because she apparently did not need it), depression in past (but off of Rx).  Admitted to [facility] on 5/7/2020 due to acute mental status changes.  Per family, pt was not feeling well, then became nonverbal and not verbally responsive at home.  Has done this before reportedly, when angry, although did not seem to be the case per se this time.  No syncope or seizure.  CTH and MRI brain negative.  W/u otherwise showed +UA/UTI and patchy R basilar infiltrate on CXR, otherwise generally unrevealing.  Received IV Abx in ED.  Has been afebrile, aleucocytotic and no signs of systemic infection.  COVID negative.    She was admitted and treated for UTI, hypertensive urgency.        HTN:    Was previously on antihypertensives, but taken off by her outpatient doctor a while back:    SBP >200 in ED.  Hemodynamically stable after BP meds given.    - Continue amlodipine 5 mg daily, prn hydralazine.    - Continue to monitor and titrate Rx prn.  Expected to need low dose antihypertensives for home; re-eval by PCP.        UTI:    - Given positive urinalysis and symptoms, will complete short course of abx, despite negative culture - written for total of 7 days bactrim.        H/o depression:    Not on Rx at baseline (was on in the past, stopped for unclear reasons), currently stable.    - Monitor here.  For outpatient f/u prn.        F/E/N:    - DASH diet.        Ppx:    - VTE ppx: LMWH (enoxaparin 40mg qd) while hospitalized..        Mobility / Disposition planning:    Lives with son and daughter in law.  Baseline function is slow but stable household ambulator without an assistive device; walks with walker outside of the house.    Pt's family has been contacted and they want her to come home with home care services,         It took 40 minutes to discharge the patient.

## 2020-05-11 NOTE — DISCHARGE NOTE NURSING/CASE MANAGEMENT/SOCIAL WORK - PATIENT PORTAL LINK FT
You can access the FollowMyHealth Patient Portal offered by [facility] by registering at the following website: [email or web address]. By joining [facility]’s FollowMyHealth portal, you will also be able to view your health information using other applications (apps) compatible with our system.

## 2020-05-11 NOTE — DISCHARGE NOTE PROVIDER - NSDCCPCAREPLAN_GEN_ALL_CORE_FT
PRINCIPAL DISCHARGE DIAGNOSIS  Diagnosis: Acute metabolic encephalopathy  Assessment and Plan of Treatment: - from UTI : resolved      SECONDARY DISCHARGE DIAGNOSES  Diagnosis: Hypertensive urgency  Assessment and Plan of Treatment: - resolved    Diagnosis: Urinary tract infection  Assessment and Plan of Treatment: - treated

## 2020-05-12 NOTE — PATIENT PROFILE ADULT. - ABILITY TO HEAR (WITH HEARING AID OR HEARING APPLIANCE IF NORMALLY USED):
----- Message from Fab Damon MD sent at 5/12/2020  7:59 AM CDT -----  You can cancel  ----- Message -----  From: Joann Lino  Sent: 5/8/2020  11:13 AM CDT  To: Fab Damon MD    Labs were improved , does he still need the EUS/Liver Biopsy on Tuesday? He needs to know so he can stop the Eliquis tomorrow if still needs.  Thanks.     Adequate: hears normal conversation without difficulty

## 2020-05-15 DIAGNOSIS — Z11.59 ENCOUNTER FOR SCREENING FOR OTHER VIRAL DISEASES: ICD-10-CM

## 2020-05-15 DIAGNOSIS — J45.20 MILD INTERMITTENT ASTHMA, UNCOMPLICATED: ICD-10-CM

## 2020-05-15 DIAGNOSIS — N39.0 URINARY TRACT INFECTION, SITE NOT SPECIFIED: ICD-10-CM

## 2020-05-15 DIAGNOSIS — N30.00 ACUTE CYSTITIS WITHOUT HEMATURIA: ICD-10-CM

## 2020-05-15 DIAGNOSIS — Z79.82 LONG TERM (CURRENT) USE OF ASPIRIN: ICD-10-CM

## 2020-05-15 DIAGNOSIS — G93.41 METABOLIC ENCEPHALOPATHY: ICD-10-CM

## 2020-05-15 DIAGNOSIS — F32.9 MAJOR DEPRESSIVE DISORDER, SINGLE EPISODE, UNSPECIFIED: ICD-10-CM

## 2020-05-15 DIAGNOSIS — I10 ESSENTIAL (PRIMARY) HYPERTENSION: ICD-10-CM

## 2020-05-15 DIAGNOSIS — R00.1 BRADYCARDIA, UNSPECIFIED: ICD-10-CM

## 2020-05-15 DIAGNOSIS — I16.0 HYPERTENSIVE URGENCY: ICD-10-CM

## 2020-09-22 NOTE — PATIENT PROFILE ADULT. - PMH
Per your note Discussion: This patient appears to be doing reasonably well from a cardiovascular view point have a component of failure for which we could increase her Lasix from 60 mg a day to a higher dose, but for now she is fairly comfortable despite the fact that she does have a component of heart failure and with her stage IV chronic kidney disease I am not going to push Lasix further at this time unless her shortness of breath issues worsen. Have asked her to weigh herself every morning after voiding and call our office if her weight goes up by more than 3 pounds over her current weight levels or if her shortness of breath on exertion worsens. 
  
We did check her dual-chamber Medtronic AICD today and she has 3.6 years remaining on the battery with no ventricular high rates. She is A paced 57.8% of time and V paced 100% of time, but her OptiVol is quite elevated today. 
  
Her most recent lipid profile which was completed on August 1, 2019 was quite high with a total cholesterol of 289, triglycerides 135, HDL of 73, LDL of 169, and VLDL of 27. She has had problems with nausea and vomiting on both Zocor and Lipitor.   I would like to repeat her cholesterol and will make further recommendations after reviewing that test. No pertinent past medical history

## 2020-11-30 NOTE — DISCHARGE NOTE NURSING/CASE MANAGEMENT/SOCIAL WORK - NSDCPEFALRISK_GEN_ALL_CORE
Patient information on fall and injury prevention Minocycline Counseling: Patient advised regarding possible photosensitivity and discoloration of the teeth, skin, lips, tongue and gums.  Patient instructed to avoid sunlight, if possible.  When exposed to sunlight, patients should wear protective clothing, sunglasses, and sunscreen.  The patient was instructed to call the office immediately if the following severe adverse effects occur:  hearing changes, easy bruising/bleeding, severe headache, or vision changes.  The patient verbalized understanding of the proper use and possible adverse effects of minocycline.  All of the patient's questions and concerns were addressed.

## 2021-04-08 NOTE — PATIENT PROFILE ADULT. - URINARY CATHETER
no Hydroxychloroquine Counseling:  I discussed with the patient that a baseline ophthalmologic exam is needed at the start of therapy and every year thereafter while on therapy. A CBC may also be warranted for monitoring.  The side effects of this medication were discussed with the patient, including but not limited to agranulocytosis, aplastic anemia, seizures, rashes, retinopathy, and liver toxicity. Patient instructed to call the office should any adverse effect occur.  The patient verbalized understanding of the proper use and possible adverse effects of Plaquenil.  All the patient's questions and concerns were addressed.

## 2021-10-28 NOTE — H&P CARDIOLOGY - RESPIRATORY
detailed exam FAMILY HISTORY:  Father  Still living? No  Family history of Alzheimer's disease, Age at diagnosis: Age Unknown    Mother  Still living? No  Family history of premature CAD, Age at diagnosis: Age Unknown

## 2022-01-01 ENCOUNTER — INPATIENT (INPATIENT)
Facility: HOSPITAL | Age: 87
LOS: 12 days | End: 2022-03-01
Attending: INTERNAL MEDICINE | Admitting: INTERNAL MEDICINE
Payer: MEDICARE

## 2022-01-01 VITALS
SYSTOLIC BLOOD PRESSURE: 109 MMHG | RESPIRATION RATE: 87 BRPM | OXYGEN SATURATION: 100 % | TEMPERATURE: 98 F | WEIGHT: 100.09 LBS | DIASTOLIC BLOOD PRESSURE: 59 MMHG | HEART RATE: 98 BPM | HEIGHT: 66 IN

## 2022-01-01 DIAGNOSIS — R53.2 FUNCTIONAL QUADRIPLEGIA: ICD-10-CM

## 2022-01-01 DIAGNOSIS — E43 UNSPECIFIED SEVERE PROTEIN-CALORIE MALNUTRITION: ICD-10-CM

## 2022-01-01 DIAGNOSIS — A41.9 SEPSIS, UNSPECIFIED ORGANISM: ICD-10-CM

## 2022-01-01 DIAGNOSIS — U07.1 COVID-19: ICD-10-CM

## 2022-01-01 DIAGNOSIS — R00.1 BRADYCARDIA, UNSPECIFIED: ICD-10-CM

## 2022-01-01 DIAGNOSIS — Z51.5 ENCOUNTER FOR PALLIATIVE CARE: ICD-10-CM

## 2022-01-01 DIAGNOSIS — F03.90 UNSPECIFIED DEMENTIA WITHOUT BEHAVIORAL DISTURBANCE: ICD-10-CM

## 2022-01-01 DIAGNOSIS — E07.81 SICK-EUTHYROID SYNDROME: ICD-10-CM

## 2022-01-01 DIAGNOSIS — N17.9 ACUTE KIDNEY FAILURE, UNSPECIFIED: ICD-10-CM

## 2022-01-01 LAB
-  COAGULASE NEGATIVE STAPHYLOCOCCUS: SIGNIFICANT CHANGE UP
ACANTHOCYTES BLD QL SMEAR: SLIGHT — SIGNIFICANT CHANGE UP
ALBUMIN SERPL ELPH-MCNC: 1.3 G/DL — LOW (ref 3.3–5)
ALBUMIN SERPL ELPH-MCNC: 1.4 G/DL — LOW (ref 3.3–5)
ALBUMIN SERPL ELPH-MCNC: 1.5 G/DL — LOW (ref 3.3–5)
ALBUMIN SERPL ELPH-MCNC: 1.6 G/DL — LOW (ref 3.3–5)
ALBUMIN SERPL ELPH-MCNC: 1.7 G/DL — LOW (ref 3.3–5)
ALBUMIN SERPL ELPH-MCNC: 1.8 G/DL — LOW (ref 3.3–5)
ALBUMIN SERPL ELPH-MCNC: 1.8 G/DL — LOW (ref 3.3–5)
ALBUMIN SERPL ELPH-MCNC: 1.9 G/DL — LOW (ref 3.3–5)
ALBUMIN SERPL ELPH-MCNC: 1.9 G/DL — LOW (ref 3.3–5)
ALP SERPL-CCNC: 112 U/L — SIGNIFICANT CHANGE UP (ref 40–120)
ALP SERPL-CCNC: 112 U/L — SIGNIFICANT CHANGE UP (ref 40–120)
ALP SERPL-CCNC: 123 U/L — HIGH (ref 40–120)
ALP SERPL-CCNC: 128 U/L — HIGH (ref 40–120)
ALP SERPL-CCNC: 138 U/L — HIGH (ref 40–120)
ALP SERPL-CCNC: 188 U/L — HIGH (ref 40–120)
ALP SERPL-CCNC: 254 U/L — HIGH (ref 40–120)
ALP SERPL-CCNC: 429 U/L — HIGH (ref 40–120)
ALP SERPL-CCNC: 79 U/L — SIGNIFICANT CHANGE UP (ref 40–120)
ALP SERPL-CCNC: 89 U/L — SIGNIFICANT CHANGE UP (ref 40–120)
ALP SERPL-CCNC: 90 U/L — SIGNIFICANT CHANGE UP (ref 40–120)
ALT FLD-CCNC: 17 U/L — SIGNIFICANT CHANGE UP (ref 12–78)
ALT FLD-CCNC: 17 U/L — SIGNIFICANT CHANGE UP (ref 12–78)
ALT FLD-CCNC: 20 U/L — SIGNIFICANT CHANGE UP (ref 12–78)
ALT FLD-CCNC: 21 U/L — SIGNIFICANT CHANGE UP (ref 12–78)
ALT FLD-CCNC: 22 U/L — SIGNIFICANT CHANGE UP (ref 12–78)
ALT FLD-CCNC: 22 U/L — SIGNIFICANT CHANGE UP (ref 12–78)
ALT FLD-CCNC: 26 U/L — SIGNIFICANT CHANGE UP (ref 12–78)
ALT FLD-CCNC: 26 U/L — SIGNIFICANT CHANGE UP (ref 12–78)
ALT FLD-CCNC: 30 U/L — SIGNIFICANT CHANGE UP (ref 12–78)
ALT FLD-CCNC: 36 U/L — SIGNIFICANT CHANGE UP (ref 12–78)
ALT FLD-CCNC: 46 U/L — SIGNIFICANT CHANGE UP (ref 12–78)
AMYLASE P1 CFR SERPL: 143 U/L — HIGH (ref 25–115)
ANION GAP SERPL CALC-SCNC: 11 MMOL/L — SIGNIFICANT CHANGE UP (ref 5–17)
ANION GAP SERPL CALC-SCNC: 2 MMOL/L — LOW (ref 5–17)
ANION GAP SERPL CALC-SCNC: 2 MMOL/L — LOW (ref 5–17)
ANION GAP SERPL CALC-SCNC: 5 MMOL/L — SIGNIFICANT CHANGE UP (ref 5–17)
ANION GAP SERPL CALC-SCNC: 6 MMOL/L — SIGNIFICANT CHANGE UP (ref 5–17)
ANION GAP SERPL CALC-SCNC: 7 MMOL/L — SIGNIFICANT CHANGE UP (ref 5–17)
ANION GAP SERPL CALC-SCNC: 7 MMOL/L — SIGNIFICANT CHANGE UP (ref 5–17)
ANION GAP SERPL CALC-SCNC: 9 MMOL/L — SIGNIFICANT CHANGE UP (ref 5–17)
ANISOCYTOSIS BLD QL: SLIGHT — SIGNIFICANT CHANGE UP
APPEARANCE UR: CLEAR — SIGNIFICANT CHANGE UP
APTT BLD: 31 SEC — SIGNIFICANT CHANGE UP (ref 27.5–35.5)
AST SERPL-CCNC: 13 U/L — LOW (ref 15–37)
AST SERPL-CCNC: 15 U/L — SIGNIFICANT CHANGE UP (ref 15–37)
AST SERPL-CCNC: 17 U/L — SIGNIFICANT CHANGE UP (ref 15–37)
AST SERPL-CCNC: 17 U/L — SIGNIFICANT CHANGE UP (ref 15–37)
AST SERPL-CCNC: 21 U/L — SIGNIFICANT CHANGE UP (ref 15–37)
AST SERPL-CCNC: 26 U/L — SIGNIFICANT CHANGE UP (ref 15–37)
AST SERPL-CCNC: 28 U/L — SIGNIFICANT CHANGE UP (ref 15–37)
AST SERPL-CCNC: 28 U/L — SIGNIFICANT CHANGE UP (ref 15–37)
AST SERPL-CCNC: 37 U/L — SIGNIFICANT CHANGE UP (ref 15–37)
AST SERPL-CCNC: 54 U/L — HIGH (ref 15–37)
AST SERPL-CCNC: 95 U/L — HIGH (ref 15–37)
BACTERIA # UR AUTO: ABNORMAL
BASOPHILS # BLD AUTO: 0 K/UL — SIGNIFICANT CHANGE UP (ref 0–0.2)
BASOPHILS NFR BLD AUTO: 0 % — SIGNIFICANT CHANGE UP (ref 0–2)
BILIRUB SERPL-MCNC: 0.7 MG/DL — SIGNIFICANT CHANGE UP (ref 0.2–1.2)
BILIRUB SERPL-MCNC: 0.8 MG/DL — SIGNIFICANT CHANGE UP (ref 0.2–1.2)
BILIRUB SERPL-MCNC: 0.9 MG/DL — SIGNIFICANT CHANGE UP (ref 0.2–1.2)
BILIRUB SERPL-MCNC: 1.2 MG/DL — SIGNIFICANT CHANGE UP (ref 0.2–1.2)
BILIRUB SERPL-MCNC: 1.2 MG/DL — SIGNIFICANT CHANGE UP (ref 0.2–1.2)
BILIRUB SERPL-MCNC: 2.1 MG/DL — HIGH (ref 0.2–1.2)
BILIRUB UR-MCNC: NEGATIVE — SIGNIFICANT CHANGE UP
BLD GP AB SCN SERPL QL: SIGNIFICANT CHANGE UP
BLD GP AB SCN SERPL QL: SIGNIFICANT CHANGE UP
BUN SERPL-MCNC: 16 MG/DL — SIGNIFICANT CHANGE UP (ref 7–23)
BUN SERPL-MCNC: 17 MG/DL — SIGNIFICANT CHANGE UP (ref 7–23)
BUN SERPL-MCNC: 19 MG/DL — SIGNIFICANT CHANGE UP (ref 7–23)
BUN SERPL-MCNC: 23 MG/DL — SIGNIFICANT CHANGE UP (ref 7–23)
BUN SERPL-MCNC: 28 MG/DL — HIGH (ref 7–23)
BUN SERPL-MCNC: 29 MG/DL — HIGH (ref 7–23)
BUN SERPL-MCNC: 32 MG/DL — HIGH (ref 7–23)
BUN SERPL-MCNC: 33 MG/DL — HIGH (ref 7–23)
BUN SERPL-MCNC: 34 MG/DL — HIGH (ref 7–23)
BUN SERPL-MCNC: 35 MG/DL — HIGH (ref 7–23)
BUN SERPL-MCNC: 35 MG/DL — HIGH (ref 7–23)
BUN SERPL-MCNC: 37 MG/DL — HIGH (ref 7–23)
BUN SERPL-MCNC: 41 MG/DL — HIGH (ref 7–23)
BUN SERPL-MCNC: 44 MG/DL — HIGH (ref 7–23)
CALCIUM SERPL-MCNC: 7.2 MG/DL — LOW (ref 8.5–10.1)
CALCIUM SERPL-MCNC: 7.7 MG/DL — LOW (ref 8.5–10.1)
CALCIUM SERPL-MCNC: 7.8 MG/DL — LOW (ref 8.5–10.1)
CALCIUM SERPL-MCNC: 7.9 MG/DL — LOW (ref 8.5–10.1)
CALCIUM SERPL-MCNC: 8 MG/DL — LOW (ref 8.5–10.1)
CALCIUM SERPL-MCNC: 8.1 MG/DL — LOW (ref 8.5–10.1)
CALCIUM SERPL-MCNC: 8.2 MG/DL — LOW (ref 8.5–10.1)
CALCIUM SERPL-MCNC: 8.2 MG/DL — LOW (ref 8.5–10.1)
CALCIUM SERPL-MCNC: 8.4 MG/DL — LOW (ref 8.5–10.1)
CALCIUM SERPL-MCNC: 8.5 MG/DL — SIGNIFICANT CHANGE UP (ref 8.5–10.1)
CALCIUM SERPL-MCNC: 9 MG/DL — SIGNIFICANT CHANGE UP (ref 8.5–10.1)
CALCIUM SERPL-MCNC: 9.8 MG/DL — SIGNIFICANT CHANGE UP (ref 8.5–10.1)
CHLORIDE SERPL-SCNC: 107 MMOL/L — SIGNIFICANT CHANGE UP (ref 96–108)
CHLORIDE SERPL-SCNC: 108 MMOL/L — SIGNIFICANT CHANGE UP (ref 96–108)
CHLORIDE SERPL-SCNC: 110 MMOL/L — HIGH (ref 96–108)
CHLORIDE SERPL-SCNC: 112 MMOL/L — HIGH (ref 96–108)
CHLORIDE SERPL-SCNC: 113 MMOL/L — HIGH (ref 96–108)
CHLORIDE SERPL-SCNC: 113 MMOL/L — HIGH (ref 96–108)
CHLORIDE SERPL-SCNC: 114 MMOL/L — HIGH (ref 96–108)
CHLORIDE SERPL-SCNC: 116 MMOL/L — HIGH (ref 96–108)
CHLORIDE SERPL-SCNC: 117 MMOL/L — HIGH (ref 96–108)
CHLORIDE SERPL-SCNC: 118 MMOL/L — HIGH (ref 96–108)
CHLORIDE SERPL-SCNC: 94 MMOL/L — LOW (ref 96–108)
CO2 SERPL-SCNC: 23 MMOL/L — SIGNIFICANT CHANGE UP (ref 22–31)
CO2 SERPL-SCNC: 23 MMOL/L — SIGNIFICANT CHANGE UP (ref 22–31)
CO2 SERPL-SCNC: 24 MMOL/L — SIGNIFICANT CHANGE UP (ref 22–31)
CO2 SERPL-SCNC: 25 MMOL/L — SIGNIFICANT CHANGE UP (ref 22–31)
CO2 SERPL-SCNC: 26 MMOL/L — SIGNIFICANT CHANGE UP (ref 22–31)
CO2 SERPL-SCNC: 28 MMOL/L — SIGNIFICANT CHANGE UP (ref 22–31)
CO2 SERPL-SCNC: 29 MMOL/L — SIGNIFICANT CHANGE UP (ref 22–31)
COLOR SPEC: YELLOW — SIGNIFICANT CHANGE UP
CORTIS AM PEAK SERPL-MCNC: 31.8 UG/DL — HIGH (ref 6–18.4)
CREAT SERPL-MCNC: 0.53 MG/DL — SIGNIFICANT CHANGE UP (ref 0.5–1.3)
CREAT SERPL-MCNC: 0.67 MG/DL — SIGNIFICANT CHANGE UP (ref 0.5–1.3)
CREAT SERPL-MCNC: 0.72 MG/DL — SIGNIFICANT CHANGE UP (ref 0.5–1.3)
CREAT SERPL-MCNC: 0.82 MG/DL — SIGNIFICANT CHANGE UP (ref 0.5–1.3)
CREAT SERPL-MCNC: 0.87 MG/DL — SIGNIFICANT CHANGE UP (ref 0.5–1.3)
CREAT SERPL-MCNC: 1.09 MG/DL — SIGNIFICANT CHANGE UP (ref 0.5–1.3)
CREAT SERPL-MCNC: 1.09 MG/DL — SIGNIFICANT CHANGE UP (ref 0.5–1.3)
CREAT SERPL-MCNC: 1.1 MG/DL — SIGNIFICANT CHANGE UP (ref 0.5–1.3)
CREAT SERPL-MCNC: 1.11 MG/DL — SIGNIFICANT CHANGE UP (ref 0.5–1.3)
CREAT SERPL-MCNC: 1.13 MG/DL — SIGNIFICANT CHANGE UP (ref 0.5–1.3)
CREAT SERPL-MCNC: 1.15 MG/DL — SIGNIFICANT CHANGE UP (ref 0.5–1.3)
CREAT SERPL-MCNC: 1.22 MG/DL — SIGNIFICANT CHANGE UP (ref 0.5–1.3)
CREAT SERPL-MCNC: 1.3 MG/DL — SIGNIFICANT CHANGE UP (ref 0.5–1.3)
CREAT SERPL-MCNC: 1.52 MG/DL — HIGH (ref 0.5–1.3)
CRP SERPL-MCNC: 293 MG/L — HIGH
CULTURE RESULTS: NO GROWTH — SIGNIFICANT CHANGE UP
CULTURE RESULTS: SIGNIFICANT CHANGE UP
CULTURE RESULTS: SIGNIFICANT CHANGE UP
D DIMER BLD IA.RAPID-MCNC: 2037 NG/ML DDU — HIGH
DIFF PNL FLD: ABNORMAL
EGFR: 67 ML/MIN/1.73M2 — SIGNIFICANT CHANGE UP
EGFR: 78 ML/MIN/1.73M2 — SIGNIFICANT CHANGE UP
ELLIPTOCYTES BLD QL SMEAR: SLIGHT — SIGNIFICANT CHANGE UP
EOSINOPHIL # BLD AUTO: 0 K/UL — SIGNIFICANT CHANGE UP (ref 0–0.5)
EOSINOPHIL NFR BLD AUTO: 0 % — SIGNIFICANT CHANGE UP (ref 0–6)
EPI CELLS # UR: SIGNIFICANT CHANGE UP
FERRITIN SERPL-MCNC: 914 NG/ML — HIGH (ref 15–150)
GLUCOSE BLDC GLUCOMTR-MCNC: 104 MG/DL — HIGH (ref 70–99)
GLUCOSE BLDC GLUCOMTR-MCNC: 188 MG/DL — HIGH (ref 70–99)
GLUCOSE SERPL-MCNC: 108 MG/DL — HIGH (ref 70–99)
GLUCOSE SERPL-MCNC: 113 MG/DL — HIGH (ref 70–99)
GLUCOSE SERPL-MCNC: 123 MG/DL — HIGH (ref 70–99)
GLUCOSE SERPL-MCNC: 137 MG/DL — HIGH (ref 70–99)
GLUCOSE SERPL-MCNC: 148 MG/DL — HIGH (ref 70–99)
GLUCOSE SERPL-MCNC: 150 MG/DL — HIGH (ref 70–99)
GLUCOSE SERPL-MCNC: 154 MG/DL — HIGH (ref 70–99)
GLUCOSE SERPL-MCNC: 160 MG/DL — HIGH (ref 70–99)
GLUCOSE SERPL-MCNC: 314 MG/DL — HIGH (ref 70–99)
GLUCOSE SERPL-MCNC: 441 MG/DL — HIGH (ref 70–99)
GLUCOSE SERPL-MCNC: 83 MG/DL — SIGNIFICANT CHANGE UP (ref 70–99)
GLUCOSE SERPL-MCNC: 88 MG/DL — SIGNIFICANT CHANGE UP (ref 70–99)
GLUCOSE SERPL-MCNC: 92 MG/DL — SIGNIFICANT CHANGE UP (ref 70–99)
GLUCOSE SERPL-MCNC: 99 MG/DL — SIGNIFICANT CHANGE UP (ref 70–99)
GLUCOSE UR QL: NEGATIVE MG/DL — SIGNIFICANT CHANGE UP
GRAM STN FLD: SIGNIFICANT CHANGE UP
HCT VFR BLD CALC: 18.4 % — CRITICAL LOW (ref 34.5–45)
HCT VFR BLD CALC: 21.2 % — LOW (ref 34.5–45)
HCT VFR BLD CALC: 21.5 % — LOW (ref 34.5–45)
HCT VFR BLD CALC: 22.2 % — LOW (ref 34.5–45)
HCT VFR BLD CALC: 22.3 % — LOW (ref 34.5–45)
HCT VFR BLD CALC: 23 % — LOW (ref 34.5–45)
HCT VFR BLD CALC: 24.4 % — LOW (ref 34.5–45)
HCT VFR BLD CALC: 24.8 % — LOW (ref 34.5–45)
HCT VFR BLD CALC: 25.8 % — LOW (ref 34.5–45)
HCT VFR BLD CALC: 26.7 % — LOW (ref 34.5–45)
HCT VFR BLD CALC: 27.4 % — LOW (ref 34.5–45)
HCT VFR BLD CALC: 29.4 % — LOW (ref 34.5–45)
HCT VFR BLD CALC: 29.5 % — LOW (ref 34.5–45)
HCT VFR BLD CALC: 30.8 % — LOW (ref 34.5–45)
HCT VFR BLD CALC: 31.1 % — LOW (ref 34.5–45)
HGB BLD-MCNC: 10 G/DL — LOW (ref 11.5–15.5)
HGB BLD-MCNC: 6.4 G/DL — CRITICAL LOW (ref 11.5–15.5)
HGB BLD-MCNC: 7.1 G/DL — LOW (ref 11.5–15.5)
HGB BLD-MCNC: 7.3 G/DL — LOW (ref 11.5–15.5)
HGB BLD-MCNC: 7.3 G/DL — LOW (ref 11.5–15.5)
HGB BLD-MCNC: 7.4 G/DL — LOW (ref 11.5–15.5)
HGB BLD-MCNC: 7.8 G/DL — LOW (ref 11.5–15.5)
HGB BLD-MCNC: 8.1 G/DL — LOW (ref 11.5–15.5)
HGB BLD-MCNC: 8.1 G/DL — LOW (ref 11.5–15.5)
HGB BLD-MCNC: 8.4 G/DL — LOW (ref 11.5–15.5)
HGB BLD-MCNC: 8.8 G/DL — LOW (ref 11.5–15.5)
HGB BLD-MCNC: 9.1 G/DL — LOW (ref 11.5–15.5)
HGB BLD-MCNC: 9.7 G/DL — LOW (ref 11.5–15.5)
HGB BLD-MCNC: 9.9 G/DL — LOW (ref 11.5–15.5)
HGB BLD-MCNC: 9.9 G/DL — LOW (ref 11.5–15.5)
HYALINE CASTS # UR AUTO: ABNORMAL /LPF
HYPOCHROMIA BLD QL: SLIGHT — SIGNIFICANT CHANGE UP
INR BLD: 1.17 RATIO — HIGH (ref 0.88–1.16)
KETONES UR-MCNC: ABNORMAL
LACTATE SERPL-SCNC: 1.3 MMOL/L — SIGNIFICANT CHANGE UP (ref 0.7–2)
LACTATE SERPL-SCNC: 4.5 MMOL/L — CRITICAL HIGH (ref 0.7–2)
LACTATE SERPL-SCNC: 6.8 MMOL/L — CRITICAL HIGH (ref 0.7–2)
LDH SERPL L TO P-CCNC: 338 U/L — HIGH (ref 50–242)
LEUKOCYTE ESTERASE UR-ACNC: NEGATIVE — SIGNIFICANT CHANGE UP
LYMPHOCYTES # BLD AUTO: 0.21 K/UL — LOW (ref 1–3.3)
LYMPHOCYTES # BLD AUTO: 4 % — LOW (ref 13–44)
MAGNESIUM SERPL-MCNC: 1.7 MG/DL — SIGNIFICANT CHANGE UP (ref 1.6–2.6)
MAGNESIUM SERPL-MCNC: 1.9 MG/DL — SIGNIFICANT CHANGE UP (ref 1.6–2.6)
MAGNESIUM SERPL-MCNC: 2 MG/DL — SIGNIFICANT CHANGE UP (ref 1.6–2.6)
MAGNESIUM SERPL-MCNC: 2 MG/DL — SIGNIFICANT CHANGE UP (ref 1.6–2.6)
MAGNESIUM SERPL-MCNC: 2.2 MG/DL — SIGNIFICANT CHANGE UP (ref 1.6–2.6)
MAGNESIUM SERPL-MCNC: 2.3 MG/DL — SIGNIFICANT CHANGE UP (ref 1.6–2.6)
MAGNESIUM SERPL-MCNC: 2.3 MG/DL — SIGNIFICANT CHANGE UP (ref 1.6–2.6)
MAGNESIUM SERPL-MCNC: 2.4 MG/DL — SIGNIFICANT CHANGE UP (ref 1.6–2.6)
MAGNESIUM SERPL-MCNC: 2.5 MG/DL — SIGNIFICANT CHANGE UP (ref 1.6–2.6)
MAGNESIUM SERPL-MCNC: 2.6 MG/DL — SIGNIFICANT CHANGE UP (ref 1.6–2.6)
MAGNESIUM SERPL-MCNC: 2.6 MG/DL — SIGNIFICANT CHANGE UP (ref 1.6–2.6)
MAGNESIUM SERPL-MCNC: 2.7 MG/DL — HIGH (ref 1.6–2.6)
MAGNESIUM SERPL-MCNC: 2.8 MG/DL — HIGH (ref 1.6–2.6)
MANUAL SMEAR VERIFICATION: SIGNIFICANT CHANGE UP
MCHC RBC-ENTMCNC: 27.2 PG — SIGNIFICANT CHANGE UP (ref 27–34)
MCHC RBC-ENTMCNC: 27.6 PG — SIGNIFICANT CHANGE UP (ref 27–34)
MCHC RBC-ENTMCNC: 27.8 PG — SIGNIFICANT CHANGE UP (ref 27–34)
MCHC RBC-ENTMCNC: 28 PG — SIGNIFICANT CHANGE UP (ref 27–34)
MCHC RBC-ENTMCNC: 28.3 PG — SIGNIFICANT CHANGE UP (ref 27–34)
MCHC RBC-ENTMCNC: 28.4 PG — SIGNIFICANT CHANGE UP (ref 27–34)
MCHC RBC-ENTMCNC: 28.5 PG — SIGNIFICANT CHANGE UP (ref 27–34)
MCHC RBC-ENTMCNC: 28.6 PG — SIGNIFICANT CHANGE UP (ref 27–34)
MCHC RBC-ENTMCNC: 28.9 PG — SIGNIFICANT CHANGE UP (ref 27–34)
MCHC RBC-ENTMCNC: 29.1 PG — SIGNIFICANT CHANGE UP (ref 27–34)
MCHC RBC-ENTMCNC: 29.3 PG — SIGNIFICANT CHANGE UP (ref 27–34)
MCHC RBC-ENTMCNC: 29.7 PG — SIGNIFICANT CHANGE UP (ref 27–34)
MCHC RBC-ENTMCNC: 29.9 PG — SIGNIFICANT CHANGE UP (ref 27–34)
MCHC RBC-ENTMCNC: 31.2 G/DL — LOW (ref 32–36)
MCHC RBC-ENTMCNC: 32 G/DL — SIGNIFICANT CHANGE UP (ref 32–36)
MCHC RBC-ENTMCNC: 32.1 G/DL — SIGNIFICANT CHANGE UP (ref 32–36)
MCHC RBC-ENTMCNC: 32.6 G/DL — SIGNIFICANT CHANGE UP (ref 32–36)
MCHC RBC-ENTMCNC: 32.7 G/DL — SIGNIFICANT CHANGE UP (ref 32–36)
MCHC RBC-ENTMCNC: 32.7 G/DL — SIGNIFICANT CHANGE UP (ref 32–36)
MCHC RBC-ENTMCNC: 33 G/DL — SIGNIFICANT CHANGE UP (ref 32–36)
MCHC RBC-ENTMCNC: 33.2 G/DL — SIGNIFICANT CHANGE UP (ref 32–36)
MCHC RBC-ENTMCNC: 33.2 G/DL — SIGNIFICANT CHANGE UP (ref 32–36)
MCHC RBC-ENTMCNC: 33.7 G/DL — SIGNIFICANT CHANGE UP (ref 32–36)
MCHC RBC-ENTMCNC: 33.9 G/DL — SIGNIFICANT CHANGE UP (ref 32–36)
MCHC RBC-ENTMCNC: 33.9 G/DL — SIGNIFICANT CHANGE UP (ref 32–36)
MCHC RBC-ENTMCNC: 34 G/DL — SIGNIFICANT CHANGE UP (ref 32–36)
MCHC RBC-ENTMCNC: 34.8 G/DL — SIGNIFICANT CHANGE UP (ref 32–36)
MCHC RBC-ENTMCNC: 34.9 G/DL — SIGNIFICANT CHANGE UP (ref 32–36)
MCV RBC AUTO: 81.2 FL — SIGNIFICANT CHANGE UP (ref 80–100)
MCV RBC AUTO: 81.8 FL — SIGNIFICANT CHANGE UP (ref 80–100)
MCV RBC AUTO: 83.2 FL — SIGNIFICANT CHANGE UP (ref 80–100)
MCV RBC AUTO: 83.3 FL — SIGNIFICANT CHANGE UP (ref 80–100)
MCV RBC AUTO: 84.5 FL — SIGNIFICANT CHANGE UP (ref 80–100)
MCV RBC AUTO: 84.6 FL — SIGNIFICANT CHANGE UP (ref 80–100)
MCV RBC AUTO: 85.6 FL — SIGNIFICANT CHANGE UP (ref 80–100)
MCV RBC AUTO: 85.7 FL — SIGNIFICANT CHANGE UP (ref 80–100)
MCV RBC AUTO: 85.8 FL — SIGNIFICANT CHANGE UP (ref 80–100)
MCV RBC AUTO: 85.9 FL — SIGNIFICANT CHANGE UP (ref 80–100)
MCV RBC AUTO: 87.3 FL — SIGNIFICANT CHANGE UP (ref 80–100)
MCV RBC AUTO: 88.1 FL — SIGNIFICANT CHANGE UP (ref 80–100)
MCV RBC AUTO: 89.9 FL — SIGNIFICANT CHANGE UP (ref 80–100)
MCV RBC AUTO: 91.7 FL — SIGNIFICANT CHANGE UP (ref 80–100)
MCV RBC AUTO: 92.9 FL — SIGNIFICANT CHANGE UP (ref 80–100)
METHOD TYPE: SIGNIFICANT CHANGE UP
MICROCYTES BLD QL: SLIGHT — SIGNIFICANT CHANGE UP
MONOCYTES # BLD AUTO: 0 K/UL — SIGNIFICANT CHANGE UP (ref 0–0.9)
MONOCYTES NFR BLD AUTO: 0 % — LOW (ref 2–14)
NEUTROPHILS # BLD AUTO: 5.01 K/UL — SIGNIFICANT CHANGE UP (ref 1.8–7.4)
NEUTROPHILS NFR BLD AUTO: 94 % — HIGH (ref 43–77)
NEUTS BAND # BLD: 2 % — SIGNIFICANT CHANGE UP (ref 0–8)
NITRITE UR-MCNC: NEGATIVE — SIGNIFICANT CHANGE UP
NRBC # BLD: 0 /100 WBCS — SIGNIFICANT CHANGE UP (ref 0–0)
NRBC # BLD: 0 /100 — SIGNIFICANT CHANGE UP (ref 0–0)
NRBC # BLD: SIGNIFICANT CHANGE UP /100 WBCS (ref 0–0)
ORGANISM # SPEC MICROSCOPIC CNT: SIGNIFICANT CHANGE UP
ORGANISM # SPEC MICROSCOPIC CNT: SIGNIFICANT CHANGE UP
OVALOCYTES BLD QL SMEAR: SLIGHT — SIGNIFICANT CHANGE UP
PH UR: 6 — SIGNIFICANT CHANGE UP (ref 5–8)
PHOSPHATE SERPL-MCNC: 1.9 MG/DL — LOW (ref 2.5–4.5)
PHOSPHATE SERPL-MCNC: 2.4 MG/DL — LOW (ref 2.5–4.5)
PHOSPHATE SERPL-MCNC: 2.4 MG/DL — LOW (ref 2.5–4.5)
PHOSPHATE SERPL-MCNC: 2.5 MG/DL — SIGNIFICANT CHANGE UP (ref 2.5–4.5)
PHOSPHATE SERPL-MCNC: 2.5 MG/DL — SIGNIFICANT CHANGE UP (ref 2.5–4.5)
PHOSPHATE SERPL-MCNC: 2.7 MG/DL — SIGNIFICANT CHANGE UP (ref 2.5–4.5)
PHOSPHATE SERPL-MCNC: 2.8 MG/DL — SIGNIFICANT CHANGE UP (ref 2.5–4.5)
PHOSPHATE SERPL-MCNC: 2.8 MG/DL — SIGNIFICANT CHANGE UP (ref 2.5–4.5)
PHOSPHATE SERPL-MCNC: 2.9 MG/DL — SIGNIFICANT CHANGE UP (ref 2.5–4.5)
PHOSPHATE SERPL-MCNC: 3 MG/DL — SIGNIFICANT CHANGE UP (ref 2.5–4.5)
PHOSPHATE SERPL-MCNC: 3.2 MG/DL — SIGNIFICANT CHANGE UP (ref 2.5–4.5)
PLAT MORPH BLD: NORMAL — SIGNIFICANT CHANGE UP
PLATELET # BLD AUTO: 111 K/UL — LOW (ref 150–400)
PLATELET # BLD AUTO: 116 K/UL — LOW (ref 150–400)
PLATELET # BLD AUTO: 125 K/UL — LOW (ref 150–400)
PLATELET # BLD AUTO: 129 K/UL — LOW (ref 150–400)
PLATELET # BLD AUTO: 139 K/UL — LOW (ref 150–400)
PLATELET # BLD AUTO: 165 K/UL — SIGNIFICANT CHANGE UP (ref 150–400)
PLATELET # BLD AUTO: 174 K/UL — SIGNIFICANT CHANGE UP (ref 150–400)
PLATELET # BLD AUTO: 177 K/UL — SIGNIFICANT CHANGE UP (ref 150–400)
PLATELET # BLD AUTO: 189 K/UL — SIGNIFICANT CHANGE UP (ref 150–400)
PLATELET # BLD AUTO: 225 K/UL — SIGNIFICANT CHANGE UP (ref 150–400)
PLATELET # BLD AUTO: 58 K/UL — LOW (ref 150–400)
PLATELET # BLD AUTO: 61 K/UL — LOW (ref 150–400)
PLATELET # BLD AUTO: 74 K/UL — LOW (ref 150–400)
PLATELET # BLD AUTO: 75 K/UL — LOW (ref 150–400)
PLATELET # BLD AUTO: 75 K/UL — LOW (ref 150–400)
POIKILOCYTOSIS BLD QL AUTO: SLIGHT — SIGNIFICANT CHANGE UP
POLYCHROMASIA BLD QL SMEAR: SLIGHT — SIGNIFICANT CHANGE UP
POTASSIUM SERPL-MCNC: 3.3 MMOL/L — LOW (ref 3.5–5.3)
POTASSIUM SERPL-MCNC: 3.3 MMOL/L — LOW (ref 3.5–5.3)
POTASSIUM SERPL-MCNC: 3.6 MMOL/L — SIGNIFICANT CHANGE UP (ref 3.5–5.3)
POTASSIUM SERPL-MCNC: 3.6 MMOL/L — SIGNIFICANT CHANGE UP (ref 3.5–5.3)
POTASSIUM SERPL-MCNC: 3.8 MMOL/L — SIGNIFICANT CHANGE UP (ref 3.5–5.3)
POTASSIUM SERPL-MCNC: 3.9 MMOL/L — SIGNIFICANT CHANGE UP (ref 3.5–5.3)
POTASSIUM SERPL-MCNC: 3.9 MMOL/L — SIGNIFICANT CHANGE UP (ref 3.5–5.3)
POTASSIUM SERPL-MCNC: 4 MMOL/L — SIGNIFICANT CHANGE UP (ref 3.5–5.3)
POTASSIUM SERPL-MCNC: 4 MMOL/L — SIGNIFICANT CHANGE UP (ref 3.5–5.3)
POTASSIUM SERPL-MCNC: 4.3 MMOL/L — SIGNIFICANT CHANGE UP (ref 3.5–5.3)
POTASSIUM SERPL-MCNC: 4.5 MMOL/L — SIGNIFICANT CHANGE UP (ref 3.5–5.3)
POTASSIUM SERPL-MCNC: 4.7 MMOL/L — SIGNIFICANT CHANGE UP (ref 3.5–5.3)
POTASSIUM SERPL-SCNC: 3.3 MMOL/L — LOW (ref 3.5–5.3)
POTASSIUM SERPL-SCNC: 3.3 MMOL/L — LOW (ref 3.5–5.3)
POTASSIUM SERPL-SCNC: 3.6 MMOL/L — SIGNIFICANT CHANGE UP (ref 3.5–5.3)
POTASSIUM SERPL-SCNC: 3.6 MMOL/L — SIGNIFICANT CHANGE UP (ref 3.5–5.3)
POTASSIUM SERPL-SCNC: 3.8 MMOL/L — SIGNIFICANT CHANGE UP (ref 3.5–5.3)
POTASSIUM SERPL-SCNC: 3.9 MMOL/L — SIGNIFICANT CHANGE UP (ref 3.5–5.3)
POTASSIUM SERPL-SCNC: 3.9 MMOL/L — SIGNIFICANT CHANGE UP (ref 3.5–5.3)
POTASSIUM SERPL-SCNC: 4 MMOL/L — SIGNIFICANT CHANGE UP (ref 3.5–5.3)
POTASSIUM SERPL-SCNC: 4 MMOL/L — SIGNIFICANT CHANGE UP (ref 3.5–5.3)
POTASSIUM SERPL-SCNC: 4.3 MMOL/L — SIGNIFICANT CHANGE UP (ref 3.5–5.3)
POTASSIUM SERPL-SCNC: 4.5 MMOL/L — SIGNIFICANT CHANGE UP (ref 3.5–5.3)
POTASSIUM SERPL-SCNC: 4.7 MMOL/L — SIGNIFICANT CHANGE UP (ref 3.5–5.3)
PROCALCITONIN SERPL-MCNC: 0.62 NG/ML — HIGH (ref 0.02–0.1)
PROT SERPL-MCNC: 4.6 GM/DL — LOW (ref 6–8.3)
PROT SERPL-MCNC: 4.9 GM/DL — LOW (ref 6–8.3)
PROT SERPL-MCNC: 4.9 GM/DL — LOW (ref 6–8.3)
PROT SERPL-MCNC: 5 GM/DL — LOW (ref 6–8.3)
PROT SERPL-MCNC: 5.2 GM/DL — LOW (ref 6–8.3)
PROT SERPL-MCNC: 5.3 GM/DL — LOW (ref 6–8.3)
PROT SERPL-MCNC: 5.4 GM/DL — LOW (ref 6–8.3)
PROT SERPL-MCNC: 5.5 GM/DL — LOW (ref 6–8.3)
PROT SERPL-MCNC: 5.6 GM/DL — LOW (ref 6–8.3)
PROT SERPL-MCNC: 5.8 GM/DL — LOW (ref 6–8.3)
PROT SERPL-MCNC: 6.3 GM/DL — SIGNIFICANT CHANGE UP (ref 6–8.3)
PROT UR-MCNC: 100 MG/DL
PROTHROM AB SERPL-ACNC: 13.5 SEC — SIGNIFICANT CHANGE UP (ref 10.6–13.6)
RAPID RVP RESULT: DETECTED
RBC # BLD: 2.25 M/UL — LOW (ref 3.8–5.2)
RBC # BLD: 2.39 M/UL — LOW (ref 3.8–5.2)
RBC # BLD: 2.58 M/UL — LOW (ref 3.8–5.2)
RBC # BLD: 2.61 M/UL — LOW (ref 3.8–5.2)
RBC # BLD: 2.61 M/UL — LOW (ref 3.8–5.2)
RBC # BLD: 2.68 M/UL — LOW (ref 3.8–5.2)
RBC # BLD: 2.84 M/UL — LOW (ref 3.8–5.2)
RBC # BLD: 2.87 M/UL — LOW (ref 3.8–5.2)
RBC # BLD: 2.93 M/UL — LOW (ref 3.8–5.2)
RBC # BLD: 3.16 M/UL — LOW (ref 3.8–5.2)
RBC # BLD: 3.2 M/UL — LOW (ref 3.8–5.2)
RBC # BLD: 3.39 M/UL — LOW (ref 3.8–5.2)
RBC # BLD: 3.43 M/UL — LOW (ref 3.8–5.2)
RBC # BLD: 3.44 M/UL — LOW (ref 3.8–5.2)
RBC # BLD: 3.53 M/UL — LOW (ref 3.8–5.2)
RBC # FLD: 17.8 % — HIGH (ref 10.3–14.5)
RBC # FLD: 18.1 % — HIGH (ref 10.3–14.5)
RBC # FLD: 18.2 % — HIGH (ref 10.3–14.5)
RBC # FLD: 18.3 % — HIGH (ref 10.3–14.5)
RBC # FLD: 18.3 % — HIGH (ref 10.3–14.5)
RBC # FLD: 18.4 % — HIGH (ref 10.3–14.5)
RBC # FLD: 18.5 % — HIGH (ref 10.3–14.5)
RBC # FLD: 18.6 % — HIGH (ref 10.3–14.5)
RBC # FLD: 18.6 % — HIGH (ref 10.3–14.5)
RBC # FLD: 18.9 % — HIGH (ref 10.3–14.5)
RBC # FLD: 18.9 % — HIGH (ref 10.3–14.5)
RBC BLD AUTO: ABNORMAL
RBC CASTS # UR COMP ASSIST: ABNORMAL /HPF (ref 0–4)
SARS-COV-2 RNA SPEC QL NAA+PROBE: DETECTED
SCHISTOCYTES BLD QL AUTO: SLIGHT — SIGNIFICANT CHANGE UP
SODIUM SERPL-SCNC: 127 MMOL/L — LOW (ref 135–145)
SODIUM SERPL-SCNC: 137 MMOL/L — SIGNIFICANT CHANGE UP (ref 135–145)
SODIUM SERPL-SCNC: 138 MMOL/L — SIGNIFICANT CHANGE UP (ref 135–145)
SODIUM SERPL-SCNC: 139 MMOL/L — SIGNIFICANT CHANGE UP (ref 135–145)
SODIUM SERPL-SCNC: 141 MMOL/L — SIGNIFICANT CHANGE UP (ref 135–145)
SODIUM SERPL-SCNC: 142 MMOL/L — SIGNIFICANT CHANGE UP (ref 135–145)
SODIUM SERPL-SCNC: 145 MMOL/L — SIGNIFICANT CHANGE UP (ref 135–145)
SODIUM SERPL-SCNC: 147 MMOL/L — HIGH (ref 135–145)
SODIUM SERPL-SCNC: 147 MMOL/L — HIGH (ref 135–145)
SODIUM SERPL-SCNC: 148 MMOL/L — HIGH (ref 135–145)
SODIUM SERPL-SCNC: 150 MMOL/L — HIGH (ref 135–145)
SP GR SPEC: 1.01 — SIGNIFICANT CHANGE UP (ref 1.01–1.02)
SPECIMEN SOURCE: SIGNIFICANT CHANGE UP
T3 SERPL-MCNC: 41 NG/DL — LOW (ref 80–200)
T3 SERPL-MCNC: 46 NG/DL — LOW (ref 80–200)
T4 AB SER-ACNC: 3.5 UG/DL — LOW (ref 4.6–12)
T4 AB SER-ACNC: 4.5 UG/DL — LOW (ref 4.6–12)
T4 FREE SERPL-MCNC: 0.6 NG/DL — LOW (ref 0.9–1.8)
TSH SERPL-MCNC: 0.03 UIU/ML — LOW (ref 0.36–3.74)
TSH SERPL-MCNC: 0.17 UIU/ML — LOW (ref 0.36–3.74)
TSH SERPL-MCNC: 0.21 UIU/ML — LOW (ref 0.36–3.74)
UROBILINOGEN FLD QL: 1 MG/DL
WBC # BLD: 10.17 K/UL — SIGNIFICANT CHANGE UP (ref 3.8–10.5)
WBC # BLD: 10.26 K/UL — SIGNIFICANT CHANGE UP (ref 3.8–10.5)
WBC # BLD: 10.41 K/UL — SIGNIFICANT CHANGE UP (ref 3.8–10.5)
WBC # BLD: 10.72 K/UL — HIGH (ref 3.8–10.5)
WBC # BLD: 11.35 K/UL — HIGH (ref 3.8–10.5)
WBC # BLD: 11.55 K/UL — HIGH (ref 3.8–10.5)
WBC # BLD: 11.63 K/UL — HIGH (ref 3.8–10.5)
WBC # BLD: 12.69 K/UL — HIGH (ref 3.8–10.5)
WBC # BLD: 13.22 K/UL — HIGH (ref 3.8–10.5)
WBC # BLD: 5.22 K/UL — SIGNIFICANT CHANGE UP (ref 3.8–10.5)
WBC # BLD: 7.08 K/UL — SIGNIFICANT CHANGE UP (ref 3.8–10.5)
WBC # BLD: 8.63 K/UL — SIGNIFICANT CHANGE UP (ref 3.8–10.5)
WBC # BLD: 8.78 K/UL — SIGNIFICANT CHANGE UP (ref 3.8–10.5)
WBC # BLD: 9.46 K/UL — SIGNIFICANT CHANGE UP (ref 3.8–10.5)
WBC # BLD: 9.74 K/UL — SIGNIFICANT CHANGE UP (ref 3.8–10.5)
WBC # FLD AUTO: 10.17 K/UL — SIGNIFICANT CHANGE UP (ref 3.8–10.5)
WBC # FLD AUTO: 10.26 K/UL — SIGNIFICANT CHANGE UP (ref 3.8–10.5)
WBC # FLD AUTO: 10.41 K/UL — SIGNIFICANT CHANGE UP (ref 3.8–10.5)
WBC # FLD AUTO: 10.72 K/UL — HIGH (ref 3.8–10.5)
WBC # FLD AUTO: 11.35 K/UL — HIGH (ref 3.8–10.5)
WBC # FLD AUTO: 11.55 K/UL — HIGH (ref 3.8–10.5)
WBC # FLD AUTO: 11.63 K/UL — HIGH (ref 3.8–10.5)
WBC # FLD AUTO: 12.69 K/UL — HIGH (ref 3.8–10.5)
WBC # FLD AUTO: 13.22 K/UL — HIGH (ref 3.8–10.5)
WBC # FLD AUTO: 5.22 K/UL — SIGNIFICANT CHANGE UP (ref 3.8–10.5)
WBC # FLD AUTO: 7.08 K/UL — SIGNIFICANT CHANGE UP (ref 3.8–10.5)
WBC # FLD AUTO: 8.63 K/UL — SIGNIFICANT CHANGE UP (ref 3.8–10.5)
WBC # FLD AUTO: 8.78 K/UL — SIGNIFICANT CHANGE UP (ref 3.8–10.5)
WBC # FLD AUTO: 9.46 K/UL — SIGNIFICANT CHANGE UP (ref 3.8–10.5)
WBC # FLD AUTO: 9.74 K/UL — SIGNIFICANT CHANGE UP (ref 3.8–10.5)
WBC UR QL: SIGNIFICANT CHANGE UP

## 2022-01-01 PROCEDURE — 93306 TTE W/DOPPLER COMPLETE: CPT | Mod: 26

## 2022-01-01 PROCEDURE — 99291 CRITICAL CARE FIRST HOUR: CPT

## 2022-01-01 PROCEDURE — 99233 SBSQ HOSP IP/OBS HIGH 50: CPT

## 2022-01-01 PROCEDURE — 99223 1ST HOSP IP/OBS HIGH 75: CPT

## 2022-01-01 PROCEDURE — 99232 SBSQ HOSP IP/OBS MODERATE 35: CPT

## 2022-01-01 PROCEDURE — 93931 UPPER EXTREMITY STUDY: CPT | Mod: 26

## 2022-01-01 PROCEDURE — 78226 HEPATOBILIARY SYSTEM IMAGING: CPT | Mod: 26

## 2022-01-01 PROCEDURE — 76705 ECHO EXAM OF ABDOMEN: CPT | Mod: 26

## 2022-01-01 PROCEDURE — 93010 ELECTROCARDIOGRAM REPORT: CPT

## 2022-01-01 PROCEDURE — 70450 CT HEAD/BRAIN W/O DYE: CPT | Mod: 26,MA

## 2022-01-01 PROCEDURE — 71045 X-RAY EXAM CHEST 1 VIEW: CPT | Mod: 26

## 2022-01-01 RX ORDER — ALBUMIN HUMAN 25 %
100 VIAL (ML) INTRAVENOUS ONCE
Refills: 0 | Status: COMPLETED | OUTPATIENT
Start: 2022-01-01 | End: 2022-01-01

## 2022-01-01 RX ORDER — PIPERACILLIN AND TAZOBACTAM 4; .5 G/20ML; G/20ML
3.38 INJECTION, POWDER, LYOPHILIZED, FOR SOLUTION INTRAVENOUS ONCE
Refills: 0 | Status: COMPLETED | OUTPATIENT
Start: 2022-01-01 | End: 2022-01-01

## 2022-01-01 RX ORDER — SODIUM CHLORIDE 9 MG/ML
1400 INJECTION INTRAMUSCULAR; INTRAVENOUS; SUBCUTANEOUS ONCE
Refills: 0 | Status: COMPLETED | OUTPATIENT
Start: 2022-01-01 | End: 2022-01-01

## 2022-01-01 RX ORDER — POTASSIUM CHLORIDE 20 MEQ
40 PACKET (EA) ORAL ONCE
Refills: 0 | Status: DISCONTINUED | OUTPATIENT
Start: 2022-01-01 | End: 2022-01-01

## 2022-01-01 RX ORDER — REMDESIVIR 5 MG/ML
200 INJECTION INTRAVENOUS EVERY 24 HOURS
Refills: 0 | Status: COMPLETED | OUTPATIENT
Start: 2022-01-01 | End: 2022-01-01

## 2022-01-01 RX ORDER — DOPAMINE HYDROCHLORIDE 40 MG/ML
5 INJECTION, SOLUTION, CONCENTRATE INTRAVENOUS
Qty: 400 | Refills: 0 | Status: DISCONTINUED | OUTPATIENT
Start: 2022-01-01 | End: 2022-01-01

## 2022-01-01 RX ORDER — POTASSIUM PHOSPHATE, MONOBASIC POTASSIUM PHOSPHATE, DIBASIC 236; 224 MG/ML; MG/ML
15 INJECTION, SOLUTION INTRAVENOUS ONCE
Refills: 0 | Status: COMPLETED | OUTPATIENT
Start: 2022-01-01 | End: 2022-01-01

## 2022-01-01 RX ORDER — POTASSIUM CHLORIDE 20 MEQ
10 PACKET (EA) ORAL
Refills: 0 | Status: COMPLETED | OUTPATIENT
Start: 2022-01-01 | End: 2022-01-01

## 2022-01-01 RX ORDER — SODIUM CHLORIDE 9 MG/ML
1000 INJECTION, SOLUTION INTRAVENOUS
Refills: 0 | Status: DISCONTINUED | OUTPATIENT
Start: 2022-01-01 | End: 2022-01-01

## 2022-01-01 RX ORDER — SODIUM CHLORIDE 9 MG/ML
500 INJECTION, SOLUTION INTRAVENOUS ONCE
Refills: 0 | Status: COMPLETED | OUTPATIENT
Start: 2022-01-01 | End: 2022-01-01

## 2022-01-01 RX ORDER — SODIUM CHLORIDE 9 MG/ML
1000 INJECTION INTRAMUSCULAR; INTRAVENOUS; SUBCUTANEOUS ONCE
Refills: 0 | Status: COMPLETED | OUTPATIENT
Start: 2022-01-01 | End: 2022-01-01

## 2022-01-01 RX ORDER — REMDESIVIR 5 MG/ML
100 INJECTION INTRAVENOUS EVERY 24 HOURS
Refills: 0 | Status: COMPLETED | OUTPATIENT
Start: 2022-01-01 | End: 2022-01-01

## 2022-01-01 RX ORDER — MIDODRINE HYDROCHLORIDE 2.5 MG/1
10 TABLET ORAL EVERY 8 HOURS
Refills: 0 | Status: DISCONTINUED | OUTPATIENT
Start: 2022-01-01 | End: 2022-01-01

## 2022-01-01 RX ORDER — NOREPINEPHRINE BITARTRATE/D5W 8 MG/250ML
0.05 PLASTIC BAG, INJECTION (ML) INTRAVENOUS
Qty: 8 | Refills: 0 | Status: DISCONTINUED | OUTPATIENT
Start: 2022-01-01 | End: 2022-01-01

## 2022-01-01 RX ORDER — PIPERACILLIN AND TAZOBACTAM 4; .5 G/20ML; G/20ML
3.38 INJECTION, POWDER, LYOPHILIZED, FOR SOLUTION INTRAVENOUS ONCE
Refills: 0 | Status: DISCONTINUED | OUTPATIENT
Start: 2022-01-01 | End: 2022-01-01

## 2022-01-01 RX ORDER — HEPARIN SODIUM 5000 [USP'U]/ML
5000 INJECTION INTRAVENOUS; SUBCUTANEOUS EVERY 12 HOURS
Refills: 0 | Status: DISCONTINUED | OUTPATIENT
Start: 2022-01-01 | End: 2022-01-01

## 2022-01-01 RX ORDER — SODIUM,POTASSIUM PHOSPHATES 278-250MG
1 POWDER IN PACKET (EA) ORAL ONCE
Refills: 0 | Status: DISCONTINUED | OUTPATIENT
Start: 2022-01-01 | End: 2022-01-01

## 2022-01-01 RX ORDER — HALOPERIDOL DECANOATE 100 MG/ML
2.5 INJECTION INTRAMUSCULAR ONCE
Refills: 0 | Status: COMPLETED | OUTPATIENT
Start: 2022-01-01 | End: 2022-01-01

## 2022-01-01 RX ORDER — PIPERACILLIN AND TAZOBACTAM 4; .5 G/20ML; G/20ML
3.38 INJECTION, POWDER, LYOPHILIZED, FOR SOLUTION INTRAVENOUS
Refills: 0 | Status: DISCONTINUED | OUTPATIENT
Start: 2022-01-01 | End: 2022-01-01

## 2022-01-01 RX ORDER — SODIUM,POTASSIUM PHOSPHATES 278-250MG
1 POWDER IN PACKET (EA) ORAL ONCE
Refills: 0 | Status: COMPLETED | OUTPATIENT
Start: 2022-01-01 | End: 2022-01-01

## 2022-01-01 RX ORDER — DEXAMETHASONE 0.5 MG/5ML
6 ELIXIR ORAL DAILY
Refills: 0 | Status: DISCONTINUED | OUTPATIENT
Start: 2022-01-01 | End: 2022-01-01

## 2022-01-01 RX ORDER — LEVOTHYROXINE SODIUM 125 MCG
12.5 TABLET ORAL AT BEDTIME
Refills: 0 | Status: DISCONTINUED | OUTPATIENT
Start: 2022-01-01 | End: 2022-01-01

## 2022-01-01 RX ORDER — CHLORHEXIDINE GLUCONATE 213 G/1000ML
1 SOLUTION TOPICAL
Refills: 0 | Status: DISCONTINUED | OUTPATIENT
Start: 2022-01-01 | End: 2022-01-01

## 2022-01-01 RX ORDER — LIDOCAINE 4 G/100G
15 CREAM TOPICAL
Refills: 0 | Status: DISCONTINUED | OUTPATIENT
Start: 2022-01-01 | End: 2022-01-01

## 2022-01-01 RX ORDER — REMDESIVIR 5 MG/ML
INJECTION INTRAVENOUS
Refills: 0 | Status: COMPLETED | OUTPATIENT
Start: 2022-01-01 | End: 2022-01-01

## 2022-01-01 RX ADMIN — CHLORHEXIDINE GLUCONATE 1 APPLICATION(S): 213 SOLUTION TOPICAL at 06:36

## 2022-01-01 RX ADMIN — HEPARIN SODIUM 5000 UNIT(S): 5000 INJECTION INTRAVENOUS; SUBCUTANEOUS at 18:00

## 2022-01-01 RX ADMIN — Medication 100 MILLIEQUIVALENT(S): at 04:26

## 2022-01-01 RX ADMIN — HALOPERIDOL DECANOATE 2.5 MILLIGRAM(S): 100 INJECTION INTRAMUSCULAR at 22:49

## 2022-01-01 RX ADMIN — SODIUM CHLORIDE 250 MILLILITER(S): 9 INJECTION, SOLUTION INTRAVENOUS at 03:07

## 2022-01-01 RX ADMIN — HEPARIN SODIUM 5000 UNIT(S): 5000 INJECTION INTRAVENOUS; SUBCUTANEOUS at 05:20

## 2022-01-01 RX ADMIN — HEPARIN SODIUM 5000 UNIT(S): 5000 INJECTION INTRAVENOUS; SUBCUTANEOUS at 05:58

## 2022-01-01 RX ADMIN — HEPARIN SODIUM 5000 UNIT(S): 5000 INJECTION INTRAVENOUS; SUBCUTANEOUS at 17:24

## 2022-01-01 RX ADMIN — HEPARIN SODIUM 5000 UNIT(S): 5000 INJECTION INTRAVENOUS; SUBCUTANEOUS at 17:17

## 2022-01-01 RX ADMIN — PIPERACILLIN AND TAZOBACTAM 25 GRAM(S): 4; .5 INJECTION, POWDER, LYOPHILIZED, FOR SOLUTION INTRAVENOUS at 11:24

## 2022-01-01 RX ADMIN — PIPERACILLIN AND TAZOBACTAM 25 GRAM(S): 4; .5 INJECTION, POWDER, LYOPHILIZED, FOR SOLUTION INTRAVENOUS at 10:54

## 2022-01-01 RX ADMIN — HEPARIN SODIUM 5000 UNIT(S): 5000 INJECTION INTRAVENOUS; SUBCUTANEOUS at 17:19

## 2022-01-01 RX ADMIN — DOPAMINE HYDROCHLORIDE 8.81 MICROGRAM(S)/KG/MIN: 40 INJECTION, SOLUTION, CONCENTRATE INTRAVENOUS at 21:54

## 2022-01-01 RX ADMIN — CHLORHEXIDINE GLUCONATE 1 APPLICATION(S): 213 SOLUTION TOPICAL at 17:24

## 2022-01-01 RX ADMIN — PIPERACILLIN AND TAZOBACTAM 25 GRAM(S): 4; .5 INJECTION, POWDER, LYOPHILIZED, FOR SOLUTION INTRAVENOUS at 10:51

## 2022-01-01 RX ADMIN — HEPARIN SODIUM 5000 UNIT(S): 5000 INJECTION INTRAVENOUS; SUBCUTANEOUS at 05:19

## 2022-01-01 RX ADMIN — PIPERACILLIN AND TAZOBACTAM 25 GRAM(S): 4; .5 INJECTION, POWDER, LYOPHILIZED, FOR SOLUTION INTRAVENOUS at 17:22

## 2022-01-01 RX ADMIN — DOPAMINE HYDROCHLORIDE 8.81 MICROGRAM(S)/KG/MIN: 40 INJECTION, SOLUTION, CONCENTRATE INTRAVENOUS at 19:19

## 2022-01-01 RX ADMIN — Medication 100 MILLIEQUIVALENT(S): at 07:43

## 2022-01-01 RX ADMIN — CHLORHEXIDINE GLUCONATE 1 APPLICATION(S): 213 SOLUTION TOPICAL at 11:24

## 2022-01-01 RX ADMIN — CHLORHEXIDINE GLUCONATE 1 APPLICATION(S): 213 SOLUTION TOPICAL at 10:50

## 2022-01-01 RX ADMIN — SODIUM CHLORIDE 50 MILLILITER(S): 9 INJECTION, SOLUTION INTRAVENOUS at 16:22

## 2022-01-01 RX ADMIN — CHLORHEXIDINE GLUCONATE 1 APPLICATION(S): 213 SOLUTION TOPICAL at 11:27

## 2022-01-01 RX ADMIN — HEPARIN SODIUM 5000 UNIT(S): 5000 INJECTION INTRAVENOUS; SUBCUTANEOUS at 17:22

## 2022-01-01 RX ADMIN — DOPAMINE HYDROCHLORIDE 8.81 MICROGRAM(S)/KG/MIN: 40 INJECTION, SOLUTION, CONCENTRATE INTRAVENOUS at 01:35

## 2022-01-01 RX ADMIN — Medication 100 MILLIEQUIVALENT(S): at 11:27

## 2022-01-01 RX ADMIN — HEPARIN SODIUM 5000 UNIT(S): 5000 INJECTION INTRAVENOUS; SUBCUTANEOUS at 05:45

## 2022-01-01 RX ADMIN — Medication 4.26 MICROGRAM(S)/KG/MIN: at 00:32

## 2022-01-01 RX ADMIN — HEPARIN SODIUM 5000 UNIT(S): 5000 INJECTION INTRAVENOUS; SUBCUTANEOUS at 17:08

## 2022-01-01 RX ADMIN — HEPARIN SODIUM 5000 UNIT(S): 5000 INJECTION INTRAVENOUS; SUBCUTANEOUS at 05:22

## 2022-01-01 RX ADMIN — DOPAMINE HYDROCHLORIDE 8.81 MICROGRAM(S)/KG/MIN: 40 INJECTION, SOLUTION, CONCENTRATE INTRAVENOUS at 19:48

## 2022-01-01 RX ADMIN — DOPAMINE HYDROCHLORIDE 8.81 MICROGRAM(S)/KG/MIN: 40 INJECTION, SOLUTION, CONCENTRATE INTRAVENOUS at 23:45

## 2022-01-01 RX ADMIN — Medication 6 MILLIGRAM(S): at 06:35

## 2022-01-01 RX ADMIN — HEPARIN SODIUM 5000 UNIT(S): 5000 INJECTION INTRAVENOUS; SUBCUTANEOUS at 17:20

## 2022-01-01 RX ADMIN — SODIUM CHLORIDE 500 MILLILITER(S): 9 INJECTION, SOLUTION INTRAVENOUS at 10:54

## 2022-01-01 RX ADMIN — Medication 100 MILLIEQUIVALENT(S): at 09:57

## 2022-01-01 RX ADMIN — Medication 100 MILLIEQUIVALENT(S): at 05:45

## 2022-01-01 RX ADMIN — HEPARIN SODIUM 5000 UNIT(S): 5000 INJECTION INTRAVENOUS; SUBCUTANEOUS at 06:35

## 2022-01-01 RX ADMIN — Medication 4.26 MICROGRAM(S)/KG/MIN: at 12:31

## 2022-01-01 RX ADMIN — CHLORHEXIDINE GLUCONATE 1 APPLICATION(S): 213 SOLUTION TOPICAL at 05:25

## 2022-01-01 RX ADMIN — Medication 100 MILLIEQUIVALENT(S): at 06:35

## 2022-01-01 RX ADMIN — HEPARIN SODIUM 5000 UNIT(S): 5000 INJECTION INTRAVENOUS; SUBCUTANEOUS at 17:58

## 2022-01-01 RX ADMIN — PIPERACILLIN AND TAZOBACTAM 25 GRAM(S): 4; .5 INJECTION, POWDER, LYOPHILIZED, FOR SOLUTION INTRAVENOUS at 01:25

## 2022-01-01 RX ADMIN — CHLORHEXIDINE GLUCONATE 1 APPLICATION(S): 213 SOLUTION TOPICAL at 05:20

## 2022-01-01 RX ADMIN — Medication 4.41 MICROGRAM(S)/KG/MIN: at 12:19

## 2022-01-01 RX ADMIN — REMDESIVIR 500 MILLIGRAM(S): 5 INJECTION INTRAVENOUS at 06:06

## 2022-01-01 RX ADMIN — Medication 50 MILLILITER(S): at 11:28

## 2022-01-01 RX ADMIN — MIDODRINE HYDROCHLORIDE 10 MILLIGRAM(S): 2.5 TABLET ORAL at 14:52

## 2022-01-01 RX ADMIN — PIPERACILLIN AND TAZOBACTAM 25 GRAM(S): 4; .5 INJECTION, POWDER, LYOPHILIZED, FOR SOLUTION INTRAVENOUS at 17:51

## 2022-01-01 RX ADMIN — CHLORHEXIDINE GLUCONATE 1 APPLICATION(S): 213 SOLUTION TOPICAL at 05:23

## 2022-01-01 RX ADMIN — Medication 100 MILLIEQUIVALENT(S): at 09:17

## 2022-01-01 RX ADMIN — POTASSIUM PHOSPHATE, MONOBASIC POTASSIUM PHOSPHATE, DIBASIC 62.5 MILLIMOLE(S): 236; 224 INJECTION, SOLUTION INTRAVENOUS at 13:13

## 2022-01-01 RX ADMIN — Medication 6 MILLIGRAM(S): at 06:00

## 2022-01-01 RX ADMIN — PIPERACILLIN AND TAZOBACTAM 25 GRAM(S): 4; .5 INJECTION, POWDER, LYOPHILIZED, FOR SOLUTION INTRAVENOUS at 01:35

## 2022-01-01 RX ADMIN — POTASSIUM PHOSPHATE, MONOBASIC POTASSIUM PHOSPHATE, DIBASIC 62.5 MILLIMOLE(S): 236; 224 INJECTION, SOLUTION INTRAVENOUS at 06:40

## 2022-01-01 RX ADMIN — REMDESIVIR 500 MILLIGRAM(S): 5 INJECTION INTRAVENOUS at 05:39

## 2022-01-01 RX ADMIN — CHLORHEXIDINE GLUCONATE 1 APPLICATION(S): 213 SOLUTION TOPICAL at 05:22

## 2022-01-01 RX ADMIN — PIPERACILLIN AND TAZOBACTAM 25 GRAM(S): 4; .5 INJECTION, POWDER, LYOPHILIZED, FOR SOLUTION INTRAVENOUS at 17:36

## 2022-01-01 RX ADMIN — DOPAMINE HYDROCHLORIDE 8.81 MICROGRAM(S)/KG/MIN: 40 INJECTION, SOLUTION, CONCENTRATE INTRAVENOUS at 12:31

## 2022-01-01 RX ADMIN — PIPERACILLIN AND TAZOBACTAM 25 GRAM(S): 4; .5 INJECTION, POWDER, LYOPHILIZED, FOR SOLUTION INTRAVENOUS at 19:24

## 2022-01-01 RX ADMIN — Medication 6 MILLIGRAM(S): at 05:19

## 2022-01-01 RX ADMIN — Medication 1 PACKET(S): at 05:25

## 2022-01-01 RX ADMIN — HEPARIN SODIUM 5000 UNIT(S): 5000 INJECTION INTRAVENOUS; SUBCUTANEOUS at 17:36

## 2022-01-01 RX ADMIN — HEPARIN SODIUM 5000 UNIT(S): 5000 INJECTION INTRAVENOUS; SUBCUTANEOUS at 17:27

## 2022-01-01 RX ADMIN — HEPARIN SODIUM 5000 UNIT(S): 5000 INJECTION INTRAVENOUS; SUBCUTANEOUS at 05:34

## 2022-01-01 RX ADMIN — Medication 6 MILLIGRAM(S): at 05:45

## 2022-01-01 RX ADMIN — SODIUM CHLORIDE 75 MILLILITER(S): 9 INJECTION, SOLUTION INTRAVENOUS at 06:33

## 2022-01-01 RX ADMIN — PIPERACILLIN AND TAZOBACTAM 25 GRAM(S): 4; .5 INJECTION, POWDER, LYOPHILIZED, FOR SOLUTION INTRAVENOUS at 17:17

## 2022-01-01 RX ADMIN — Medication 100 MILLIEQUIVALENT(S): at 08:15

## 2022-01-01 RX ADMIN — CHLORHEXIDINE GLUCONATE 1 APPLICATION(S): 213 SOLUTION TOPICAL at 05:19

## 2022-01-01 RX ADMIN — DOPAMINE HYDROCHLORIDE 8.81 MICROGRAM(S)/KG/MIN: 40 INJECTION, SOLUTION, CONCENTRATE INTRAVENOUS at 07:25

## 2022-01-01 RX ADMIN — CHLORHEXIDINE GLUCONATE 1 APPLICATION(S): 213 SOLUTION TOPICAL at 08:40

## 2022-01-01 RX ADMIN — REMDESIVIR 500 MILLIGRAM(S): 5 INJECTION INTRAVENOUS at 06:35

## 2022-01-01 RX ADMIN — REMDESIVIR 500 MILLIGRAM(S): 5 INJECTION INTRAVENOUS at 05:45

## 2022-01-01 RX ADMIN — SODIUM CHLORIDE 1000 MILLILITER(S): 9 INJECTION INTRAMUSCULAR; INTRAVENOUS; SUBCUTANEOUS at 03:10

## 2022-01-01 RX ADMIN — Medication 4.26 MICROGRAM(S)/KG/MIN: at 14:46

## 2022-01-01 RX ADMIN — HEPARIN SODIUM 5000 UNIT(S): 5000 INJECTION INTRAVENOUS; SUBCUTANEOUS at 05:18

## 2022-01-01 RX ADMIN — DOPAMINE HYDROCHLORIDE 8.81 MICROGRAM(S)/KG/MIN: 40 INJECTION, SOLUTION, CONCENTRATE INTRAVENOUS at 09:57

## 2022-01-01 RX ADMIN — Medication 6 MILLIGRAM(S): at 05:20

## 2022-01-01 RX ADMIN — SODIUM CHLORIDE 50 MILLILITER(S): 9 INJECTION, SOLUTION INTRAVENOUS at 18:36

## 2022-01-01 RX ADMIN — DOPAMINE HYDROCHLORIDE 8.81 MICROGRAM(S)/KG/MIN: 40 INJECTION, SOLUTION, CONCENTRATE INTRAVENOUS at 13:36

## 2022-01-01 RX ADMIN — PIPERACILLIN AND TAZOBACTAM 200 GRAM(S): 4; .5 INJECTION, POWDER, LYOPHILIZED, FOR SOLUTION INTRAVENOUS at 00:28

## 2022-01-01 RX ADMIN — SODIUM CHLORIDE 75 MILLILITER(S): 9 INJECTION, SOLUTION INTRAVENOUS at 22:58

## 2022-01-01 RX ADMIN — DOPAMINE HYDROCHLORIDE 8.81 MICROGRAM(S)/KG/MIN: 40 INJECTION, SOLUTION, CONCENTRATE INTRAVENOUS at 03:56

## 2022-01-01 RX ADMIN — HEPARIN SODIUM 5000 UNIT(S): 5000 INJECTION INTRAVENOUS; SUBCUTANEOUS at 05:25

## 2022-01-01 RX ADMIN — DOPAMINE HYDROCHLORIDE 8.81 MICROGRAM(S)/KG/MIN: 40 INJECTION, SOLUTION, CONCENTRATE INTRAVENOUS at 17:45

## 2022-01-01 RX ADMIN — Medication 6 MILLIGRAM(S): at 06:08

## 2022-01-01 RX ADMIN — Medication 6 MILLIGRAM(S): at 05:18

## 2022-01-01 RX ADMIN — PIPERACILLIN AND TAZOBACTAM 25 GRAM(S): 4; .5 INJECTION, POWDER, LYOPHILIZED, FOR SOLUTION INTRAVENOUS at 02:32

## 2022-01-01 RX ADMIN — CHLORHEXIDINE GLUCONATE 1 APPLICATION(S): 213 SOLUTION TOPICAL at 06:08

## 2022-01-01 RX ADMIN — SODIUM CHLORIDE 1400 MILLILITER(S): 9 INJECTION INTRAMUSCULAR; INTRAVENOUS; SUBCUTANEOUS at 00:28

## 2022-01-01 RX ADMIN — CHLORHEXIDINE GLUCONATE 1 APPLICATION(S): 213 SOLUTION TOPICAL at 05:58

## 2022-01-01 RX ADMIN — REMDESIVIR 500 MILLIGRAM(S): 5 INJECTION INTRAVENOUS at 05:30

## 2022-01-01 RX ADMIN — PIPERACILLIN AND TAZOBACTAM 25 GRAM(S): 4; .5 INJECTION, POWDER, LYOPHILIZED, FOR SOLUTION INTRAVENOUS at 01:36

## 2022-01-01 RX ADMIN — HEPARIN SODIUM 5000 UNIT(S): 5000 INJECTION INTRAVENOUS; SUBCUTANEOUS at 17:09

## 2022-01-01 RX ADMIN — Medication 100 MILLIEQUIVALENT(S): at 08:31

## 2022-01-01 RX ADMIN — PIPERACILLIN AND TAZOBACTAM 25 GRAM(S): 4; .5 INJECTION, POWDER, LYOPHILIZED, FOR SOLUTION INTRAVENOUS at 12:04

## 2022-01-01 RX ADMIN — HEPARIN SODIUM 5000 UNIT(S): 5000 INJECTION INTRAVENOUS; SUBCUTANEOUS at 19:24

## 2022-01-01 RX ADMIN — HEPARIN SODIUM 5000 UNIT(S): 5000 INJECTION INTRAVENOUS; SUBCUTANEOUS at 06:08

## 2022-01-01 RX ADMIN — PIPERACILLIN AND TAZOBACTAM 25 GRAM(S): 4; .5 INJECTION, POWDER, LYOPHILIZED, FOR SOLUTION INTRAVENOUS at 11:12

## 2022-01-01 RX ADMIN — HEPARIN SODIUM 5000 UNIT(S): 5000 INJECTION INTRAVENOUS; SUBCUTANEOUS at 05:48

## 2022-01-01 RX ADMIN — PIPERACILLIN AND TAZOBACTAM 3.38 GRAM(S): 4; .5 INJECTION, POWDER, LYOPHILIZED, FOR SOLUTION INTRAVENOUS at 01:00

## 2022-01-01 RX ADMIN — PIPERACILLIN AND TAZOBACTAM 25 GRAM(S): 4; .5 INJECTION, POWDER, LYOPHILIZED, FOR SOLUTION INTRAVENOUS at 01:46

## 2022-01-01 RX ADMIN — HEPARIN SODIUM 5000 UNIT(S): 5000 INJECTION INTRAVENOUS; SUBCUTANEOUS at 05:17

## 2022-01-01 RX ADMIN — DOPAMINE HYDROCHLORIDE 8.81 MICROGRAM(S)/KG/MIN: 40 INJECTION, SOLUTION, CONCENTRATE INTRAVENOUS at 13:59

## 2022-01-01 RX ADMIN — SODIUM CHLORIDE 1400 MILLILITER(S): 9 INJECTION INTRAMUSCULAR; INTRAVENOUS; SUBCUTANEOUS at 01:24

## 2022-02-08 NOTE — PATIENT PROFILE ADULT. - NS PRO CONTRA REFUSE FLU INFO
[FreeTextEntry1] : PFTs 7/20/18 - essentially normal.\par Spirometry 1/6/20 - essentially normal \par PFTs 6/11/21 - normal\par PFTs 2/3/22 - essentially normal with a borderline reduced FEV1 but overall reduced from baseline
Risks/benefits discussed with patient or patient surrogate

## 2022-02-16 NOTE — ED ADULT TRIAGE NOTE - CHIEF COMPLAINT QUOTE
hx of dementia per EMS pt was not responsive to verbal stimuli., usual baseline alert to person groans to verbal stimuli. LKN 1hr ago per EMS hypotensive  70/60 given 500 ml via 18 Lac 18G. daughter- in law sushil [place] [phone].  In ED pt responsive to verbal stimuli. hx of dementia per EMS pt was not responsive to verbal stimuli., usual baseline alert to person groans to verbal stimuli. LKN 1hr PTA to ED. per EMS hypotensive 70/60 given 500 ml NS via 18 L A/C 18G. daughter- in law sushil [place] [phone].  In ED pt responsive to verbal stimuli.

## 2022-02-17 NOTE — CONSULT NOTE ADULT - SUBJECTIVE AND OBJECTIVE BOX
SHAMAR GUILLERMO  MRN-[id number]        Patient is a 89y old  Female who presents with a chief complaint of hypotension, COVID pneumonia (2022 04:24)      HPI:  89 YO female with PMH asthma (not on meds), HTN (not on meds), dementia (baseline is that pt can walk and talk but needs assistance with all ADLs), presents for lethargy, weakness, FTT over the last 2-3 days. Pt received first dose Pfizer 2 weeks ago.  In the ED, pt found to be COVID positive with labs significant for lactate 6.8, creatinine 1.5, sodium 147.  CT head negative for acute pathology.  CXR with patchy opacity of right lung.  Pt was given 1.5L of IVF and remained hypotensive peripheral levophed started.    Admit to ICU for septic shock 2/2 COVID pneumonia. (2022 04:24)      ID consulted for workup and antibiotic management     PAST MEDICAL & SURGICAL HISTORY:  HTN (hypertension)    Asthma    Dementia    No significant past surgical history        Allergies  No Known Allergies        ANTIMICROBIALS:  remdesivir  IVPB        MEDICATIONS  (STANDING):  piperacillin/tazobactam IVPB.   200 mL/Hr IV Intermittent (22 @ 00:28)    remdesivir  IVPB   500 mL/Hr IV Intermittent (22 @ 06:35)        OTHER MEDS: MEDICATIONS  (STANDING):  dexAMETHasone  Injectable 6 daily  heparin   Injectable 5000 every 12 hours  norepinephrine Infusion 0.048 <Continuous>      SOCIAL HISTORY:       FAMILY HISTORY:  Family history of cancer (Sibling)  Cousin        REVIEW OF SYSTEMS  [  ] ROS unobtainable because:    [  ] All other systems negative except as noted below:	    Constitutional:  [ ] fever [ ] chills  [ ] weight loss  [ ] weakness  Skin:  [ ] rash [ ] phlebitis	  Eyes: [ ] icterus [ ] pain  [ ] discharge	  ENMT: [ ] sore throat  [ ] thrush [ ] ulcers [ ] exudates  Respiratory: [ ] dyspnea [ ] hemoptysis [ ] cough [ ] sputum	  Cardiovascular:  [ ] chest pain [ ] palpitations [ ] edema	  Gastrointestinal:  [ ] nausea [ ] vomiting [ ] diarrhea [ ] constipation [ ] pain	  Genitourinary:  [ ] dysuria [ ] frequency [ ] hematuria [ ] discharge [ ] flank pain  [ ] incontinence  Musculoskeletal:  [ ] myalgias [ ] arthralgias [ ] arthritis  [ ] back pain  Neurological:  [ ] headache [ ] seizures  [ ] confusion/altered mental status  Psychiatric:  [ ] anxiety [ ] depression	  Hematology/Lymphatics:  [ ] lymphadenopathy  Endocrine:  [ ] adrenal [ ] thyroid  Allergic/Immunologic:	 [ ] transplant [ ] seasonal    Vital Signs Last 24 Hrs  T(F): 99.3 (22 @ 12:30), Max: 99.3 (22 @ 12:30)    Vital Signs Last 24 Hrs  HR: 84 (22 @ 10:47) (61 - 98)  BP: 129/105 (22 @ 10:00) (79/46 - 132/58)  RR: 14 (22 @ 10:47)  SpO2: 100% (22 @ 10:47) (96% - 100%)  Wt(kg): --    PHYSICAL EXAM:  Constitutional: non-toxic, no distress  HEAD/EYES: anicteric, no conjunctival injection  ENT:  supple, no thrush  Cardiovascular:   normal S1, S2, no murmur, no edema  Respiratory:  clear BS bilaterally, no wheezes, no rales  GI:  soft, non-tender, normal bowel sounds  :  no bautista, no CVA tenderness  Musculoskeletal:  no synovitis, normal ROM  Neurologic: awake and alert, normal strength, no focal findings  Skin:  no rash, no erythema, no phlebitis  Heme/Onc: no lymphadenopathy   Psychiatric:  awake, alert, appropriate mood          WBC Count: 9.46 K/uL ( @ 10:28)  WBC Count: 5.22 K/uL ( @ 00:24)      Auto Neutrophil %: 94.0 % *H* (22 @ 00:24)  Auto Neutrophil #: 5.01 K/uL (22 @ 00:24)                            8.1    9.46  )-----------( 74       ( 2022 10:28 )             24.8           148<H>  |  118<H>  |  41<H>  ----------------------------<  92  4.7   |  28  |  1.15    Ca    8.4<L>      2022 11:01  Phos  1.9       Mg     2.5         TPro  6.3  /  Alb  1.9<L>  /  TBili  0.8  /  DBili  x   /  AST  37  /  ALT  21  /  AlkPhos  112        Creatinine Trend: 1.15<--, 1.52<--    Urinalysis Basic - ( 2022 01:52 )    Color: Yellow / Appearance: Clear / S.010 / pH: x  Gluc: x / Ketone: Trace  / Bili: Negative / Urobili: 1 mg/dL   Blood: x / Protein: 100 mg/dL / Nitrite: Negative   Leuk Esterase: Negative / RBC: 3-5 /HPF / WBC 0-2   Sq Epi: x / Non Sq Epi: Few / Bacteria: Many        MICROBIOLOGY:    SARS-CoV-2: Detected (2022 00:24)    Rapid RVP Result: Detected ( @ 00:24)  D-Dimer Assay, Quantitative:  ()      RADIOLOGY:      < from: CT Head No Cont (22 @ 02:41) >  IMPRESSION:    No acute intracranial hemorrhage or mass effect. Moderate chronic   ischemic changes in the frontoparietal white matter.    < end of copied text >           SHAMAR GUILLERMO  MRN-[id number]        Patient is a 89y old  Female who presents with a chief complaint of hypotension, COVID pneumonia (2022 04:24)      HPI:  89 YO female with PMH asthma (not on meds), HTN (not on meds), dementia (baseline is that pt can walk and talk but needs assistance with all ADLs), presents for lethargy, weakness, FTT over the last 2-3 days. Pt received first dose Pfizer 2 weeks ago.  In the ED, pt found to be COVID positive with labs significant for lactate 6.8, creatinine 1.5, sodium 147.  CT head negative for acute pathology.  CXR with patchy opacity of right lung.  Pt was given 1.5L of IVF and remained hypotensive peripheral levophed started.    Admit to ICU for septic shock 2/2 COVID pneumonia. (2022 04:24)      ID consulted for workup and antibiotic management     PAST MEDICAL & SURGICAL HISTORY:  HTN (hypertension)    Asthma    Dementia    No significant past surgical history        Allergies  No Known Allergies        ANTIMICROBIALS:  remdesivir  IVPB        MEDICATIONS  (STANDING):  piperacillin/tazobactam IVPB.   200 mL/Hr IV Intermittent (22 @ 00:28)    remdesivir  IVPB   500 mL/Hr IV Intermittent (22 @ 06:35)        OTHER MEDS: MEDICATIONS  (STANDING):  dexAMETHasone  Injectable 6 daily  heparin   Injectable 5000 every 12 hours  norepinephrine Infusion 0.048 <Continuous>      SOCIAL HISTORY:     unable   FAMILY HISTORY:  Family history of cancer (Sibling)  Cousin        REVIEW OF SYSTEMS  [x  ] ROS unobtainable because:  poor mental status  [  ] All other systems negative except as noted below:	    Constitutional:  [ ] fever [ ] chills  [ ] weight loss  [ ] weakness  Skin:  [ ] rash [ ] phlebitis	  Eyes: [ ] icterus [ ] pain  [ ] discharge	  ENMT: [ ] sore throat  [ ] thrush [ ] ulcers [ ] exudates  Respiratory: [ ] dyspnea [ ] hemoptysis [ ] cough [ ] sputum	  Cardiovascular:  [ ] chest pain [ ] palpitations [ ] edema	  Gastrointestinal:  [ ] nausea [ ] vomiting [ ] diarrhea [ ] constipation [ ] pain	  Genitourinary:  [ ] dysuria [ ] frequency [ ] hematuria [ ] discharge [ ] flank pain  [ ] incontinence  Musculoskeletal:  [ ] myalgias [ ] arthralgias [ ] arthritis  [ ] back pain  Neurological:  [ ] headache [ ] seizures  [ ] confusion/altered mental status  Psychiatric:  [ ] anxiety [ ] depression	  Hematology/Lymphatics:  [ ] lymphadenopathy  Endocrine:  [ ] adrenal [ ] thyroid  Allergic/Immunologic:	 [ ] transplant [ ] seasonal    Vital Signs Last 24 Hrs  T(F): 99.3 (22 @ 12:30), Max: 99.3 (22 @ 12:30)    Vital Signs Last 24 Hrs  HR: 84 (22 @ 10:47) (61 - 98)  BP: 129/105 (22 @ 10:00) (79/46 - 132/58)  RR: 14 (22 @ 10:47)  SpO2: 100% (22 @ 10:47) (96% - 100%)  Wt(kg): --    PHYSICAL EXAM:  Constitutional: non-toxic, no distress, on ventimask   HEAD/EYES: anicteric, no conjunctival injection  ENT:  supple, no thrush  Cardiovascular:   normal S1, S2, no murmur, no edema  Respiratory:  diminished BS bilaterally, no wheezes, no rales  GI:  soft, non-tender, normal bowel sounds  :  no bautista, no CVA tenderness  Musculoskeletal:  no synovitis, normal ROM  Neurologic: not awake or alert  Skin:  no rash, no erythema, no phlebitis  Heme/Onc: no lymphadenopathy   Psychiatric:  awake, alert, appropriate mood      WBC Count: 9.46 K/uL ( @ 10:28)  WBC Count: 5.22 K/uL ( @ 00:24)      Auto Neutrophil %: 94.0 % *H* (22 @ 00:24)  Auto Neutrophil #: 5.01 K/uL (22 @ 00:24)                            8.1    9.46  )-----------( 74       ( 2022 10:28 )             24.8           148<H>  |  118<H>  |  41<H>  ----------------------------<  92  4.7   |  28  |  1.15    Ca    8.4<L>      2022 11:01  Phos  1.9       Mg     2.5         TPro  6.3  /  Alb  1.9<L>  /  TBili  0.8  /  DBili  x   /  AST  37  /  ALT  21  /  AlkPhos  112        Creatinine Trend: 1.15<--, 1.52<--    Urinalysis Basic - ( 2022 01:52 )    Color: Yellow / Appearance: Clear / S.010 / pH: x  Gluc: x / Ketone: Trace  / Bili: Negative / Urobili: 1 mg/dL   Blood: x / Protein: 100 mg/dL / Nitrite: Negative   Leuk Esterase: Negative / RBC: 3-5 /HPF / WBC 0-2   Sq Epi: x / Non Sq Epi: Few / Bacteria: Many        MICROBIOLOGY:    SARS-CoV-2: Detected (2022 00:24)    Rapid RVP Result: Detected ( @ 00:24)  D-Dimer Assay, Quantitative:  ()      RADIOLOGY:      < from: CT Head No Cont (22 @ 02:41) >  IMPRESSION:    No acute intracranial hemorrhage or mass effect. Moderate chronic   ischemic changes in the frontoparietal white matter.    < end of copied text >

## 2022-02-17 NOTE — ED ADULT NURSE NOTE - OBJECTIVE STATEMENT
patient alert and oriented x0. pt poor historian at this time. hx of dementia per EMS pt was not responsive to verbal stimuli, usual baseline alert to person groans to verbal stimuli. per EMS pt was hypotensive 70/60 given 500 ml NS via 18 L A/C 18G. pt is currently responsive to verbal stimuli. pt noted to have bilateral peeling of feet, redness noted to sacrum. PMH HTN, asthma.

## 2022-02-17 NOTE — ED ADULT NURSE NOTE - CHIEF COMPLAINT QUOTE
hx of dementia per EMS pt was not responsive to verbal stimuli., usual baseline alert to person groans to verbal stimuli. LKN 1hr PTA to ED. per EMS hypotensive 70/60 given 500 ml NS via 18 L A/C 18G. daughter- in law sushil quintanilla [phone].  In ED pt responsive to verbal stimuli.

## 2022-02-17 NOTE — H&P ADULT - ASSESSMENT
89 YO female with PMH asthma (not on meds), HTN (not on meds), dementia (baseline is that pt can walk and talk but needs assistance with all ADLs), presents for lethargy, weakness, FTT over the last 2-3 days, found to have COVID pneumonia. Pt received 1st dose Pfizer 2 weeks ago.  Admit to ICU for septic shock requiring pressors.      NEURO: per daughter in law, pt walks and talks at baseline - currently only moaning, will continue to monitor for now - CT head negative for acute path  RESP: on NRB with sats 100%, will wean as tolerated  CV: levophed for MAP >65 - s/p 2.5L IVF  GI: NPO for now  RENAL: baseline creatinine appears 0.9 - continue trend and monitor for now  ID: COVID positive - will start decadron and remdesivir per protocol, monitor inflammatory markers  PPX: heparin  GOC: full as per DW DIL  DISPO: ICU

## 2022-02-17 NOTE — PATIENT PROFILE ADULT - VISION (WITH CORRECTIVE LENSES IF THE PATIENT USUALLY WEARS THEM):
unknown unknown/Normal vision: sees adequately in most situations; can see medication labels, newsprint

## 2022-02-17 NOTE — H&P ADULT - ATTENDING COMMENTS
Pt is a 89 yo F with h/o asthma (not on meds), HTN (not on meds), dementia (baseline is that pt can walk and talk but needs assistance with all ADLs), presented 2 to lethargy, weakness, FTT over the last 2-3 days. Pt received first dose Pfizer 2 weeks ago. In the ER, pt found to be COVID positive and hypotensive with labs significant for lactate 6.8, creatinine 1.5, sodium 147. CXR with bibasilar infiltrates but is very rotated. Pt was given 1.5L of IVF and remained hypotensive peripheral Levophed started. ICU admitting dx: Septic shock 2 to Covid-19 PNA Pt is a 89 yo F with h/o asthma (not on meds), HTN (not on meds), dementia (baseline is that pt can walk and talk but needs assistance with all ADLs), presented 2 to lethargy, weakness, FTT over the last 2-3 days. Pt received first dose Pfizer 2 weeks ago. In the ER, pt found to be COVID positive and hypotensive with labs significant for lactate 6.8, creatinine 1.5, sodium 147. CXR with bibasilar infiltrates but is very rotated. Pt was given 1.5L of IVF and remained hypotensive peripheral Levophed started. ICU admitting dx: Septic shock 2 to Covid-19 PNA    Resp: Decrease FiO2 to Venti mask  ID: Cont Remdesivir + Decadron/ ? Toci as per ID consult  CVS: Levophed to maintain SBP >100/ Lactic acidosis has cleared  HEME: DVT prophylaxis with sq Heparin/ Thrombocytopenia; trend platelets  FEN: NPO for now  Renal: DARREN 2 to ATN improving, gentle hydration and follow BUN/Cr and UO    CCT: 45 min not in conjunction with the NP Pt is a 89 yo F with h/o asthma (not on meds), HTN (not on meds), dementia (baseline is that pt can walk and talk but needs assistance with all ADLs), presented 2 to lethargy, weakness, FTT over the last 2-3 days. Pt received first dose Pfizer 2 weeks ago. In the ER, pt found to be COVID positive and hypotensive with labs significant for lactate 6.8, creatinine 1.5, sodium 147. CXR with bibasilar infiltrates but is very rotated. Pt was given 1.5L of IVF and remained hypotensive peripheral Levophed started. ICU admitting dx: Septic shock 2 to Covid-19 PNA    Resp: Decrease FiO2 to Venti mask  ID: Cont Remdesivir + Decadron/ ? Toci as per ID consult  CVS: Levophed to maintain SBP >100/ Lactic acidosis has cleared  HEME: DVT prophylaxis with sq Heparin/ Thrombocytopenia; trend platelets  FEN: NPO for now  Renal: DARREN 2 to ATN improving, gentle hydration and follow BUN/Cr and UO    Metrics  POCUS: LV hyperdynamic. no pericardial effusion. no focal wall motion abn. no rv strain or dilation. VTi 28.4. (Dr Thurman)  DVT prophylaxis: sq Heparin  GOC: Full code    CCT: 45 min not in conjunction with the NP

## 2022-02-17 NOTE — ED PROVIDER NOTE - OBJECTIVE STATEMENT
89 yo F bibems for AMS and hypotension.  Pt. is demented and moans at baseline.  Today she became even less responsive than usual which prompted EMS call.  Pt. cannot provide history due to baseline dementia.  On arrival after EMS started hydration, pt. started ot perk up and moan again, similar to baseline mental status.    ROS: unobtainable from patient  PMH: asthma, HTN (previously on BP Rx, but stopped by PCP as per family, because she apparently did not need it), depression in past (but off of Rx); Meds: See EMR for list; SH: Denies smoking/drinking/drug use 89 yo F bibems for AMS and hypotension.  Pt. is demented and moans at baseline.  Today she became even less responsive than usual which prompted EMS call.  Pt. cannot provide history due to baseline dementia.  On arrival after EMS started hydration, pt. started ot perk up and moan again, similar to baseline mental status.    ROS: unobtainable from patient  PMH: asthma, HTN (previously on BP Rx, but stopped by PCP as per family, because she apparently did not need it), depression in past (but off of Rx); Meds: See EMR for list; SH: Denies smoking/drinking/drug use  daughter-in-law: Mazin Kohli, [phone] 89 yo F bibems for AMS and hypotension.  Pt. is demented and moans at baseline.  Today she became even less responsive than usual which prompted EMS call.  Pt. cannot provide history due to baseline dementia.  On arrival after EMS started hydration, pt. started ot perk up and moan again, similar to baseline mental status.  Daughter in law says she's been seeming under the weather for the last couple of days, worsening until she became minimally responsive today.  She notes pt. hasn't being eating and drinking well.  Last week she got tested for covid for cough but test was negative.  Feb two weeks ago pfizer.  ROS: unobtainable from patient  PMH: asthma, HTN (previously on BP Rx, but stopped by PCP as per family, because she apparently did not need it), depression in past (but off of Rx); Meds: See EMR for list; SH: Denies smoking/drinking/drug use  daughter-in-law: Mazin Kohli, [phone]

## 2022-02-17 NOTE — H&P ADULT - HISTORY OF PRESENT ILLNESS
89 YO female with PMH asthma (not on meds), HTN (not on meds), dementia (baseline is that pt can walk and talk but needs assistance with all ADLs), presents for lethargy, weakness, FTT over the last 2-3 days. Pt received first dose Pfizer 2 weeks ago.  In the ED, pt found to be COVID positive with labs significant for lactate 6.8, creatinine 1.5, sodium 147.  CT head negative for acute pathology.  CXR with patchy opacity of right lung.  Pt was given 1.5L of IVF and remained hypotensive peripheral levophed started.    Admit to ICU for septic shock 2/2 COVID pneumonia.

## 2022-02-17 NOTE — PATIENT PROFILE ADULT - FALL HARM RISK - HARM RISK INTERVENTIONS

## 2022-02-17 NOTE — ED PROVIDER NOTE - CARE PLAN
1 Principal Discharge DX:	Induced hypothermia  Secondary Diagnosis:	AMS (altered mental status)  Secondary Diagnosis:	Sepsis   Principal Discharge DX:	Induced hypothermia  Secondary Diagnosis:	AMS (altered mental status)  Secondary Diagnosis:	Sepsis  Secondary Diagnosis:	Pneumonia

## 2022-02-17 NOTE — ED ADULT NURSE NOTE - NSICDXFAMILYHX_GEN_ALL_CORE_FT
FAMILY HISTORY:  Sibling  Still living? No  Family history of cancer, Age at diagnosis: Age Unknown

## 2022-02-17 NOTE — ED ADULT NURSE NOTE - NSIMPLEMENTINTERV_GEN_ALL_ED
details… Implemented All Fall with Harm Risk Interventions:  [place] to call system. Call bell, personal items and telephone within reach. Instruct patient to call for assistance. Room bathroom lighting operational. Non-slip footwear when patient is off stretcher. Physically safe environment: no spills, clutter or unnecessary equipment. Stretcher in lowest position, wheels locked, appropriate side rails in place. Provide visual cue, wrist band, yellow gown, etc. Monitor gait and stability. Monitor for mental status changes and reorient to person, place, and time. Review medications for side effects contributing to fall risk. Reinforce activity limits and safety measures with patient and family. Provide visual clues: red socks. detailed exam ROM intact/no calf tenderness

## 2022-02-17 NOTE — ED PROVIDER NOTE - PHYSICAL EXAMINATION
Vitals: hypotensive at 79/46, hypothermia at 89 rectal temp  Gen: moaning, localizes pain, NAD, laying in stretcher, non-verbal  Head: ncat, perrla, eomi b/l  Neck: supple, no lymphadenopathy, no midline deviation  Heart: rrr, no m/r/g  Lungs: CTA b/l, no rales/ronchi/wheezes  Abd: soft, nontender, non-distended, no rebound or guarding  Ext: no clubbing/cyanosis/edema  Neuro: sensation and muscle strength intact b/l, no apparent focal weakness or sensory loss Vitals: hypotensive at 79/46, hypothermia at 89 rectal temp  Gen: moaning, withdraws from pain, NAD, laying in stretcher, non-verbal  Head: ncat, perrla, eomi b/l  Neck: supple, no lymphadenopathy, no midline deviation  Heart: rrr, no m/r/g  Lungs: CTA b/l, no rales/ronchi/wheezes  Abd: soft, nontender, non-distended, no rebound or guarding  Ext: no clubbing/cyanosis/edema  Neuro: sensation and muscle strength intact b/l, no apparent focal weakness or sensory loss  derm: no sings of infection, no ulcers on roll

## 2022-02-17 NOTE — ED PROVIDER NOTE - CLINICAL SUMMARY MEDICAL DECISION MAKING FREE TEXT BOX
89 yo F with ams, possible sepsis, hypothermia, doubt cva, acs  -basic labs, coags, blood cx, lactate, ua, cx, rvp/covid, CXR, CT brain, ekg, iv, zosyn coverage, fluid sepsis bolus, norepi for pressure support if needed  -fu results, reeval

## 2022-02-17 NOTE — CHART NOTE - NSCHARTNOTEFT_GEN_A_CORE
bedside cardiac POCUS - LV hyperdynamic. no pericardial effusion. no focal wall motion abn. no rv strain or dilation. VTi 28.4.

## 2022-02-18 NOTE — PROGRESS NOTE ADULT - ASSESSMENT
89 YO female with PMH asthma (not on meds), HTN (not on meds), dementia (baseline is that pt can walk and talk but needs assistance with all ADLs), presents for lethargy, weakness, FTT over the last 2-3 days. Pt received first dose Pfizer 2 weeks ago.  In the ED, pt found to be COVID positive with labs significant for lactate 6.8, creatinine 1.5, sodium 147.  CT head negative for acute pathology.  CXR with patchy opacity of right lung.  Pt was given 1.5L of IVF and remained hypotensive peripheral levophed started.  Admit to ICU for septic shock 2/2 COVID pneumonia  asked to evaluate for possible tocilizumab. She came in and documentation shows 100% on room air then she was placed on NRB but since then she had been weaned to venti mask.  Her platelets are low and close to the threshold of 50k   she had a very high lactate and there may be a develop bacterial process which would exclude her from receiving tocilizumab     2/18: patient has been weaned off oxygen successfully , has elevated alk phos and tender abdomen so US was done and shows possible acute cholecystitis started on zosyn, deemed not a surgical candidate, if necessary can get perc cholecystotomy tube, HIDA scan pending      COVID  acute cholecystitis   ARF   functional quadriplegia   high serum lactate    Plan:    #COVID  Monitor clinically.  Monitor Oxygenation  O2 supplementation if needed   Remdesivir   Monitor CBC, CMP daily  Ferritin, CRP, and D dimer q 48 hrs.  IV Dexamethasone 6mg q24hrs x10 days if hypoxia.  Anticoagulation per protocol.  Treatment options are limited  Monitor for any bacterial superinfection/complications.  This tx plan was formulated utilizing my clinical judgement, currently available local/regional anecdotal information, organizational treatment recommendations with COVID-19 specific considerations given rapidly changing information available.   not a candidate for tocilizumab at this kal     #Acute Respiratory Failure- resolved   chest PT  routine PT  OOB to chair if able   decadron 6mg q24hrs for 10 days  Inhalers-MDI and avoid nebulizers except in negative pressure room     #high serum lactate-resolved    send lactate if worsens   pressors per icu   started on zosyn for acute cholecystitis     #acute cholecystitis   surgery recommendations appreciated   zosyn   blood cultures   trend LFTs   perc choley if necessary     D/W Kimberlyn Resendez, DO  Infectious Disease Attending  Reachable via Microsoft Teams or ID office: [phone]  After 5pm/weekends please call [phone] for all inquiries and new consults

## 2022-02-18 NOTE — PROGRESS NOTE ADULT - SUBJECTIVE AND OBJECTIVE BOX
SHAMAR GUILLERMO  MRN-[id number]      Follow Up:  covid     Interval History: patient seen and examined. She is minimally responsive, responds to painful stimuli     ROS:    [X ] Unobtainable because: poor mental status   [ ] All other systems negative except as noted          Allergies  No Known Allergies        ANTIMICROBIALS:  piperacillin/tazobactam IVPB.. 3.375 <User Schedule>  remdesivir  IVPB 100 every 24 hours  remdesivir  IVPB        OTHER MEDS:  chlorhexidine 2% Cloths 1 Application(s) Topical <User Schedule>  dexAMETHasone  Injectable 6 milliGRAM(s) IV Push daily  dextrose 5%. 1000 milliLiter(s) IV Continuous <Continuous>  heparin   Injectable 5000 Unit(s) SubCutaneous every 12 hours  norepinephrine Infusion 0.048 MICROgram(s)/kG/Min IV Continuous <Continuous>      Physical Exam:  Vital Signs Last 24 Hrs  T(C): 37.3 (2022 11:00), Max: 37.3 (2022 16:37)  T(F): 99.2 (2022 11:00), Max: 99.2 (2022 11:00)  HR: 101 (2022 14:00) (27 - 102)  BP: 112/62 (2022 14:00) (92/38 - 146/66)  BP(mean): 72 (2022 14:00) (48 - 97)  RR: 17 (2022 14:00) (17 - 32)  SpO2: 100% (2022 14:00) (95% - 100%)    General:    NAD,  non toxic, on room air   Head: atraumatic, normocephalic  Eye: normal sclera and conjunctiva  ENT:    no oral lesions, neck supple  Cardio:     regular S1, S2,  no murmur  Respiratory:    clear b/l,    no wheezing  abd:     soft,   BS +,   diffuse tenderness   :  + suprapubic tenderness,    Musculoskeletal:   no joint swelling,   no edema  vascular: no central lines, +PIV   Skin:    no rash  Neurologic:    poor mental status, respons to painful stimuli   psych: difficult to assess     WBC Count: 13.22 K/uL ( @ 04:16)  WBC Count: 9.46 K/uL ( @ 10:28)  WBC Count: 5.22 K/uL ( @ 00:24)                            8.8    13.22 )-----------( 75       ( 2022 04:16 )             26.7           150<H>  |  117<H>  |  37<H>  ----------------------------<  113<H>  4.3   |  26  |  1.22    Ca    9.0      2022 04:16  Phos  2.9       Mg     2.4         TPro  5.8<L>  /  Alb  1.6<L>  /  TBili  1.2  /  DBili  x   /  AST  95<H>  /  ALT  46  /  AlkPhos  429<H>        Urinalysis Basic - ( 2022 01:52 )    Color: Yellow / Appearance: Clear / S.010 / pH: x  Gluc: x / Ketone: Trace  / Bili: Negative / Urobili: 1 mg/dL   Blood: x / Protein: 100 mg/dL / Nitrite: Negative   Leuk Esterase: Negative / RBC: 3-5 /HPF / WBC 0-2   Sq Epi: x / Non Sq Epi: Few / Bacteria: Many        Creatinine Trend: 1.22<--, 1.15<--, 1.52<--  Lactate, Blood: 1.3 mmol/L (22 @ 10:28)  Lactate, Blood: 4.5 mmol/L (22 @ 01:52)  Lactate, Blood: 6.8 mmol/L (22 @ 00:24)      MICROBIOLOGY:  v  Clean Catch Clean Catch (Midstream)  22   No growth  --  --      .Blood Blood-Peripheral  22   No growth to date.  --  --      Rapid RVP Result: Detected ( @ 00:24)        C-Reactive Protein, Serum: 293 ()    Ferritin, Serum: 914 ()      D-Dimer Assay, Quantitative:  ()    Procalcitonin, Serum: 0.62 (22 @ 16:45)    SARS-CoV-2: Detected (22 @ 00:24)  Rapid RVP Result: Detected (22 @ 00:24)        RADIOLOGY:  < from: US Abdomen Limited (22 @ 10:43) >  IMPRESSION:    Distended gallbladder with gallstones and sludge. Wall thickening present.  Questionable focal tenderness. Recommend nuclear medicine hepatobiliary   scan for further assessment.  Dilated biliary and pancreatic ducts.

## 2022-02-18 NOTE — CONSULT NOTE ADULT - SUBJECTIVE AND OBJECTIVE BOX
Patient is a 89y old  Female who presents with a chief complaint of hypotension, COVID pneumonia (2022 11:43)      HPI:  89 YO female with PMH asthma (not on meds), HTN (not on meds), dementia (baseline is that pt can walk and talk but needs assistance with all ADLs), presents for lethargy, weakness, FTT over the last 2-3 days. Pt received first dose Pfizer 2 weeks ago.  In the ED, pt found to be COVID positive with labs significant for lactate 6.8, creatinine 1.5, sodium 147.  CT head negative for acute pathology.  CXR with patchy opacity of right lung.  Pt was given 1.5L of IVF and remained hypotensive peripheral levophed started.    Admit to ICU for septic shock 2/2 COVID pneumonia. (2022 04:24)    Recieved Haldol for delirium/agitation overnight. On levophed.      PAST MEDICAL & SURGICAL HISTORY:  HTN (hypertension)  Asthma  Dementia  No significant past surgical history    Review of Systems:  unable to obtain due to patient status    MEDICATIONS  (STANDING):  chlorhexidine 2% Cloths 1 Application(s) Topical <User Schedule>  dexAMETHasone  Injectable 6 milliGRAM(s) IV Push daily  dextrose 5%. 1000 milliLiter(s) (75 mL/Hr) IV Continuous <Continuous>  heparin   Injectable 5000 Unit(s) SubCutaneous every 12 hours  norepinephrine Infusion 0.048 MICROgram(s)/kG/Min (4.26 mL/Hr) IV Continuous <Continuous>  piperacillin/tazobactam IVPB.. 3.375 Gram(s) IV Intermittent <User Schedule>  remdesivir  IVPB 100 milliGRAM(s) IV Intermittent every 24 hours  remdesivir  IVPB   IV Intermittent       No Known Allergies    SOCIAL HISTORY unknown          FAMILY HISTORY:  Family history of cancer (Sibling)  Cousin      Vital Signs Last 24 Hrs  T(C): 37.3 (2022 11:00), Max: 37.3 (2022 16:37)  T(F): 99.2 (2022 11:00), Max: 99.2 (2022 11:00)  HR: 101 (2022 14:00) (27 - 102)  BP: 112/62 (2022 14:00) (92/38 - 146/66)  BP(mean): 72 (2022 14:00) (48 - 97)  RR: 17 (2022 14:00) (17 - 32)  SpO2: 100% (2022 14:00) (95% - 100%)    Physical Exam:  General: lethargic  HENT: WNL, no JVD  Chest: clear breath sounds  Cardiovascular:  Regular rate & rhythm  Abdomen: soft  Extremities:   Skin:  No rash  Musculoskeletal:  normal strength  Neuro/Psych:  Alert, oriented to time, place and person     LABS:                        8.8    13.22 )-----------( 75       ( 2022 04:16 )             26.7     0218    150<H>  |  117<H>  |  37<H>  ----------------------------<  113<H>  4.3   |  26  |  1.22    Ca    9.0      2022 04:16  Phos  2.9       Mg     2.4         TPro  5.8<L>  /  Alb  1.6<L>  /  TBili  1.2  /  DBili  x   /  AST  95<H>  /  ALT  46  /  AlkPhos  429<H>  18    PT/INR - ( 2022 00:24 )   PT: 13.5 sec;   INR: 1.17 ratio         PTT - ( 2022 00:24 )  PTT:31.0 sec  Urinalysis Basic - ( 2022 01:52 )    Color: Yellow / Appearance: Clear / S.010 / pH: x  Gluc: x / Ketone: Trace  / Bili: Negative / Urobili: 1 mg/dL   Blood: x / Protein: 100 mg/dL / Nitrite: Negative   Leuk Esterase: Negative / RBC: 3-5 /HPF / WBC 0-2   Sq Epi: x / Non Sq Epi: Few / Bacteria: Many      Culture Results:   No growth ( @ 09:53)  Culture Results:   No growth to date. ( @ 09:03)  Culture Results:   No growth to date. ( @ 09:03)      RADIOLOGY & ADDITIONAL STUDIES:  < from: US Abdomen Limited (22 @ 10:43) >  Distended gallbladder with gallstones and sludge. Wall thickening present.  Questionable focal tenderness. Recommend nuclear medicine hepatobiliary   scan for further assessment.  Dilated biliary and pancreatic ducts.      < end of copied text >      Impression/Plan: 89 yo F with h/o asthma HTN dementia admitted with septic shock presumed due to COVID pna  Noted with elevated alk phos -- Abd US performed, results reviewed:  Distended gallbladder with stones/sludge and wall thickening  Normal bilirubin noted, Doubt biliary obstruction  Recommend continued CCU management with pressor support as needed and broad spectrum Abx.  No surgical intervention indicated at present time  If patient clinically deteriorates, would consider percutaneous cholecystostomy  Discussed with Dr Raza     Patient is a 89y old  Female who presents with a chief complaint of hypotension, COVID pneumonia (2022 11:43)      HPI:  89 YO female with PMH asthma (not on meds), HTN (not on meds), dementia (baseline is that pt can walk and talk but needs assistance with all ADLs), presents for lethargy, weakness, FTT over the last 2-3 days. Pt received first dose Pfizer 2 weeks ago.  In the ED, pt found to be COVID positive with labs significant for lactate 6.8, creatinine 1.5, sodium 147.  CT head negative for acute pathology.  CXR with patchy opacity of right lung.  Pt was given 1.5L of IVF and remained hypotensive peripheral levophed started.    Admit to ICU for septic shock 2/2 COVID pneumonia. (2022 04:24)    Recieved Haldol for delirium/agitation overnight. On levophed.      PAST MEDICAL & SURGICAL HISTORY:  HTN (hypertension)  Asthma  Dementia  No significant past surgical history    Review of Systems:  unable to obtain due to patient status    MEDICATIONS  (STANDING):  chlorhexidine 2% Cloths 1 Application(s) Topical <User Schedule>  dexAMETHasone  Injectable 6 milliGRAM(s) IV Push daily  dextrose 5%. 1000 milliLiter(s) (75 mL/Hr) IV Continuous <Continuous>  heparin   Injectable 5000 Unit(s) SubCutaneous every 12 hours  norepinephrine Infusion 0.048 MICROgram(s)/kG/Min (4.26 mL/Hr) IV Continuous <Continuous>  piperacillin/tazobactam IVPB.. 3.375 Gram(s) IV Intermittent <User Schedule>  remdesivir  IVPB 100 milliGRAM(s) IV Intermittent every 24 hours  remdesivir  IVPB   IV Intermittent       No Known Allergies    SOCIAL HISTORY unknown          FAMILY HISTORY:  Family history of cancer (Sibling)  Cousin      Vital Signs Last 24 Hrs  T(C): 37.3 (2022 11:00), Max: 37.3 (2022 16:37)  T(F): 99.2 (2022 11:00), Max: 99.2 (2022 11:00)  HR: 101 (2022 14:00) (27 - 102)  BP: 112/62 (2022 14:00) (92/38 - 146/66)  BP(mean): 72 (2022 14:00) (48 - 97)  RR: 17 (2022 14:00) (17 - 32)  SpO2: 100% (2022 14:00) (95% - 100%)    Physical Exam:  General: lethargic, grimaces to sternal rub  HENT: WNL  Chest: respirations nonlabored  Cardiovascular: +S1S2  Abdomen: soft, nondistended, no guarding or rebound tenderness. Lemon's sign not elicited  Extremities: calves soft and nontender bilaterally      LABS:                        8.8    13.22 )-----------( 75       ( 2022 04:16 )             26.7     18    150<H>  |  117<H>  |  37<H>  ----------------------------<  113<H>  4.3   |  26  |  1.22    Ca    9.0      2022 04:16  Phos  2.9       Mg     2.4         TPro  5.8<L>  /  Alb  1.6<L>  /  TBili  1.2  /  DBili  x   /  AST  95<H>  /  ALT  46  /  AlkPhos  429<H>  18    PT/INR - ( 2022 00:24 )   PT: 13.5 sec;   INR: 1.17 ratio         PTT - ( 2022 00:24 )  PTT:31.0 sec  Urinalysis Basic - ( 2022 01:52 )    Color: Yellow / Appearance: Clear / S.010 / pH: x  Gluc: x / Ketone: Trace  / Bili: Negative / Urobili: 1 mg/dL   Blood: x / Protein: 100 mg/dL / Nitrite: Negative   Leuk Esterase: Negative / RBC: 3-5 /HPF / WBC 0-2   Sq Epi: x / Non Sq Epi: Few / Bacteria: Many      Culture Results:   No growth ( @ 09:53)  Culture Results:   No growth to date. ( @ 09:03)  Culture Results:   No growth to date. ( @ 09:03)      RADIOLOGY & ADDITIONAL STUDIES:  < from: US Abdomen Limited (02.18.22 @ 10:43) >  Distended gallbladder with gallstones and sludge. Wall thickening present.  Questionable focal tenderness. Recommend nuclear medicine hepatobiliary   scan for further assessment.  Dilated biliary and pancreatic ducts.      < end of copied text >      Impression/Plan: 89F with h/o asthma HTN dementia admitted with septic shock presumed due to COVID pna  Noted with elevated alk phos -- Abd US performed, results reviewed:  Distended gallbladder with stones/sludge and wall thickening  Normal bilirubin noted, Doubt biliary obstruction  Recommend continued CCU management with pressor support as needed and broad spectrum Abx.  No surgical intervention indicated at present time  If patient clinically deteriorates, would consider percutaneous cholecystostomy  Discussed with Dr Raza     Patient is a 89y old  Female who presents with a chief complaint of hypotension, COVID pneumonia (2022 11:43)      HPI:  89 YO female with PMH asthma (not on meds), HTN (not on meds), dementia (baseline is that pt can walk and talk but needs assistance with all ADLs), presents for lethargy, weakness, FTT over the last 2-3 days. Pt received first dose Pfizer 2 weeks ago.  In the ED, pt found to be COVID positive with labs significant for lactate 6.8, creatinine 1.5, sodium 147.  CT head negative for acute pathology.  CXR with patchy opacity of right lung.  Pt was given 1.5L of IVF and remained hypotensive peripheral levophed started.    Admit to ICU for septic shock 2/2 COVID pneumonia. (2022 04:24)    Recieved Haldol for delirium/agitation overnight. On levophed.      PAST MEDICAL & SURGICAL HISTORY:  HTN (hypertension)  Asthma  Dementia  No significant past surgical history    Review of Systems:  unable to obtain due to patient status    MEDICATIONS  (STANDING):  chlorhexidine 2% Cloths 1 Application(s) Topical <User Schedule>  dexAMETHasone  Injectable 6 milliGRAM(s) IV Push daily  dextrose 5%. 1000 milliLiter(s) (75 mL/Hr) IV Continuous <Continuous>  heparin   Injectable 5000 Unit(s) SubCutaneous every 12 hours  norepinephrine Infusion 0.048 MICROgram(s)/kG/Min (4.26 mL/Hr) IV Continuous <Continuous>  piperacillin/tazobactam IVPB.. 3.375 Gram(s) IV Intermittent <User Schedule>  remdesivir  IVPB 100 milliGRAM(s) IV Intermittent every 24 hours  remdesivir  IVPB   IV Intermittent       No Known Allergies    SOCIAL HISTORY unknown          FAMILY HISTORY:  Family history of cancer (Sibling)  Cousin      Vital Signs Last 24 Hrs  T(C): 37.3 (2022 11:00), Max: 37.3 (2022 16:37)  T(F): 99.2 (2022 11:00), Max: 99.2 (2022 11:00)  HR: 101 (2022 14:00) (27 - 102)  BP: 112/62 (2022 14:00) (92/38 - 146/66)  BP(mean): 72 (2022 14:00) (48 - 97)  RR: 17 (2022 14:00) (17 - 32)  SpO2: 100% (2022 14:00) (95% - 100%)    Physical Exam:  General: lethargic, grimaces to sternal rub  HENT: WNL  Chest: respirations nonlabored  Cardiovascular: +S1S2  Abdomen: soft, nondistended, no guarding or rebound tenderness. Lemon's sign not elicited  Extremities: calves soft and nontender bilaterally      LABS:                        8.8    13.22 )-----------( 75       ( 2022 04:16 )             26.7     18    150<H>  |  117<H>  |  37<H>  ----------------------------<  113<H>  4.3   |  26  |  1.22    Ca    9.0      2022 04:16  Phos  2.9       Mg     2.4         TPro  5.8<L>  /  Alb  1.6<L>  /  TBili  1.2  /  DBili  x   /  AST  95<H>  /  ALT  46  /  AlkPhos  429<H>  18    PT/INR - ( 2022 00:24 )   PT: 13.5 sec;   INR: 1.17 ratio         PTT - ( 2022 00:24 )  PTT:31.0 sec  Urinalysis Basic - ( 2022 01:52 )    Color: Yellow / Appearance: Clear / S.010 / pH: x  Gluc: x / Ketone: Trace  / Bili: Negative / Urobili: 1 mg/dL   Blood: x / Protein: 100 mg/dL / Nitrite: Negative   Leuk Esterase: Negative / RBC: 3-5 /HPF / WBC 0-2   Sq Epi: x / Non Sq Epi: Few / Bacteria: Many      Culture Results:   No growth ( @ 09:53)  Culture Results:   No growth to date. ( @ 09:03)  Culture Results:   No growth to date. ( @ 09:03)      RADIOLOGY & ADDITIONAL STUDIES:  < from: US Abdomen Limited (02.18.22 @ 10:43) >  Distended gallbladder with gallstones and sludge. Wall thickening present.  Questionable focal tenderness. Recommend nuclear medicine hepatobiliary   scan for further assessment.  Dilated biliary and pancreatic ducts.      < end of copied text >      Impression/Plan: 89F with h/o asthma HTN dementia admitted with septic shock presumed due to COVID pna  Noted with elevated alk phos -- Abd US performed, results reviewed:  Distended gallbladder with stones/sludge and wall thickening  Normal bilirubin noted, Doubt biliary obstruction  Recommend continued CCU management with pressor support as needed and broad spectrum Abx.  No surgical intervention indicated at present time  If patient clinically deteriorates, would consider percutaneous cholecystostomy  HIDA pending.  Discussed with Dr Raza     Patient is a 89y old  Female who presents with a chief complaint of hypotension, COVID pneumonia (2022 11:43)      HPI:  89 YO female with PMH asthma (not on meds), HTN (not on meds), dementia (baseline is that pt can walk and talk but needs assistance with all ADLs), presents for lethargy, weakness, FTT over the last 2-3 days. Pt received first dose Pfizer 2 weeks ago.  In the ED, pt found to be COVID positive with labs significant for lactate 6.8, creatinine 1.5, sodium 147.  CT head negative for acute pathology.  CXR with patchy opacity of right lung.  Pt was given 1.5L of IVF and remained hypotensive peripheral levophed started.    Admit to ICU for septic shock 2/2 COVID pneumonia. (2022 04:24)    Recieved Haldol for delirium/agitation overnight. On levophed.      PAST MEDICAL & SURGICAL HISTORY:  HTN (hypertension)  Asthma  Dementia  No significant past surgical history    Review of Systems:  unable to obtain due to patient status    MEDICATIONS  (STANDING):  chlorhexidine 2% Cloths 1 Application(s) Topical <User Schedule>  dexAMETHasone  Injectable 6 milliGRAM(s) IV Push daily  dextrose 5%. 1000 milliLiter(s) (75 mL/Hr) IV Continuous <Continuous>  heparin   Injectable 5000 Unit(s) SubCutaneous every 12 hours  norepinephrine Infusion 0.048 MICROgram(s)/kG/Min (4.26 mL/Hr) IV Continuous <Continuous>  piperacillin/tazobactam IVPB.. 3.375 Gram(s) IV Intermittent <User Schedule>  remdesivir  IVPB 100 milliGRAM(s) IV Intermittent every 24 hours  remdesivir  IVPB   IV Intermittent       No Known Allergies    SOCIAL HISTORY unknown          FAMILY HISTORY:  Family history of cancer (Sibling)  Cousin      Vital Signs Last 24 Hrs  T(C): 37.3 (2022 11:00), Max: 37.3 (2022 16:37)  T(F): 99.2 (2022 11:00), Max: 99.2 (2022 11:00)  HR: 101 (2022 14:00) (27 - 102)  BP: 112/62 (2022 14:00) (92/38 - 146/66)  BP(mean): 72 (2022 14:00) (48 - 97)  RR: 17 (2022 14:00) (17 - 32)  SpO2: 100% (2022 14:00) (95% - 100%)    Physical Exam:  General: lethargic, grimaces to sternal rub  HENT: WNL  Chest: respirations nonlabored  Cardiovascular: +S1S2  Abdomen: soft, nondistended, no guarding or rebound tenderness. Lemon's sign not elicited  Extremities: calves soft and nontender bilaterally      LABS:                        8.8    13.22 )-----------( 75       ( 2022 04:16 )             26.7     18    150<H>  |  117<H>  |  37<H>  ----------------------------<  113<H>  4.3   |  26  |  1.22    Ca    9.0      2022 04:16  Phos  2.9       Mg     2.4         TPro  5.8<L>  /  Alb  1.6<L>  /  TBili  1.2  /  DBili  x   /  AST  95<H>  /  ALT  46  /  AlkPhos  429<H>  18    PT/INR - ( 2022 00:24 )   PT: 13.5 sec;   INR: 1.17 ratio         PTT - ( 2022 00:24 )  PTT:31.0 sec  Urinalysis Basic - ( 2022 01:52 )    Color: Yellow / Appearance: Clear / S.010 / pH: x  Gluc: x / Ketone: Trace  / Bili: Negative / Urobili: 1 mg/dL   Blood: x / Protein: 100 mg/dL / Nitrite: Negative   Leuk Esterase: Negative / RBC: 3-5 /HPF / WBC 0-2   Sq Epi: x / Non Sq Epi: Few / Bacteria: Many      Culture Results:   No growth ( @ 09:53)  Culture Results:   No growth to date. ( @ 09:03)  Culture Results:   No growth to date. ( @ 09:03)      RADIOLOGY & ADDITIONAL STUDIES:  < from: US Abdomen Limited (02.18.22 @ 10:43) >  Distended gallbladder with gallstones and sludge. Wall thickening present.  Questionable focal tenderness. Recommend nuclear medicine hepatobiliary   scan for further assessment.  Dilated biliary and pancreatic ducts.      < end of copied text >      Impression/Plan: 89F with h/o asthma HTN dementia admitted with septic shock presumed due to COVID pna  Noted with elevated alk phos -- Abd US performed, results reviewed:  Distended gallbladder with stones/sludge and wall thickening  Normal bilirubin noted, Doubt biliary obstruction  Recommend continued CCU management with pressor support as needed and broad spectrum Abx.  No surgical intervention indicated at present time. Pt is poor surgical candidate at present.  If patient clinically deteriorates, would consider percutaneous cholecystostomy  HIDA pending.  Discussed with Dr Raza

## 2022-02-18 NOTE — PROGRESS NOTE ADULT - ATTENDING COMMENTS
Pt is a 89 yo F with h/o asthma (not on meds), HTN (not on meds), dementia (baseline is that pt can walk and talk but needs assistance with all ADLs), presented 2 to lethargy, weakness, FTT over the last 2-3 days. Pt received first dose Pfizer 2 weeks ago. In the ER, pt found to be COVID positive and hypotensive with labs significant for lactate 6.8, creatinine 1.5, sodium 147. CXR with bibasilar infiltrates but is very rotated. Pt was given 1.5L of IVF and remained hypotensive peripheral Levophed started. ICU admitting dx: Septic shock 2 to biliary source vs Covid-19 PNA    Resp: Not requiring supplemental O2 today  ID: Cont Remdesivir + Decadron/ Start Zosyn for biliary sepsis/ F/u as per ID  CVS: Still requiring Levophed to maintain SBP >100/ Lactic acidosis has cleared  HEME: DVT prophylaxis with sq Heparin/ Thrombocytopenia; trend platelets (increasing)  FEN: NPO  GI: R UQ U/S: Distended gallbladder with gallstones and sludge. Wall thickening present. Questionable focal tenderness. Recommend nuclear medicine hepatobiliary scan for further assessment. Dilated biliary and pancreatic ducts/ HIDA scan today/ Surg consult  Renal: DARREN 2 to ATN; gentle hydration and follow BUN/Cr and UO  Social: Full code

## 2022-02-18 NOTE — PROGRESS NOTE ADULT - ASSESSMENT
89 YO female with PMH asthma, HTN, dementia p/w 2-3 days of lethargy and weakness found to be hypotensive, hypothermic, and COVID+ with RLL infiltrate admitted to the ICU for septic shock requiring pressors. Now with WBC of 13, ALP of 13.33, AST 95, elevated inflammatory markers, intermittent MAPs in 30's/40's and dilated biliary duct and gall bladder c/f possible biliary sepsis.    Neuro:   q1 hr neuro checks    Resp:   % sat on RA    ID:   Cont Remdesivir + Decadron for COVID PNA  Zosyn for possible biliary sepsis    CVS: Levophed to maintain SBP >100/ Lactic acidosis has cleared  HEME: DVT prophylaxis with sq Heparin/ Thrombocytopenia; trend platelets  FEN: NPO for now  Renal: DARREN 2 to ATN improving, gentle hydration and follow BUN/Cr and UO 89 YO female with PMH asthma, HTN, dementia p/w 2-3 days of lethargy and weakness found to be hypotensive, hypothermic, and COVID+ with RLL infiltrate admitted to the ICU for septic shock requiring pressors. Now with WBC of 13, ALP of 13.33, AST 95, elevated inflammatory markers, intermittent MAPs in 30's/40's and dilated biliary duct (9mm) and gall bladder c/f possible biliary sepsis.    Neuro:   q1 hr neuro checks    Resp:   % sat on RA    ID:   Cont Remdesivir + Decadron for COVID PNA  Zosyn for possible biliary sepsis    CVS:   Levophed to maintain SBP >100 and MAP >65    GI:  dilated pancreatic and biliary ducts (CBD 9mm) and distended, thickened gallbladder with sludge  consult GI   f/u HIDA?    Renal/Electrolytes:   Na 150, c/w D5 glucose  BUN/Cr improving with adequate UO; continue to monitor    FEN:   failed speech and swallow  NPO for now    HEME:   DVT prophylaxis with sq Heparin  Thrombocytopenia; trend platelets    Full code, GOC discussion with family   89 YO female with PMH asthma, HTN, dementia p/w 2-3 days of lethargy and weakness found to be hypotensive, hypothermic, and COVID+ with RLL infiltrate admitted to the ICU for septic shock requiring pressors. Now with WBC of 13, ALP of 13.33, AST 95, elevated inflammatory markers, intermittent MAPs in 30's/40's and dilated biliary duct (9mm) and gall bladder c/f possible biliary sepsis.    Neuro:   q1 hr neuro checks    Resp:   % sat on RA    ID:   Cont Remdesivir + Decadron for COVID PNA  Zosyn for possible biliary sepsis    CVS:   Levophed to maintain SBP >100 and MAP >65    GI:  distended, thickened gallbladder with sludge and CBD 9mm (normal for age)  f/u HIDA scan    Renal/Electrolytes:   Na 150, c/w D5 glucose  BUN/Cr improving with adequate UO; continue to monitor    FEN:   failed speech and swallow  NPO for now    HEME:   DVT prophylaxis with sq Heparin  Thrombocytopenia; trend platelets    Full code, GOC discussion with family   89 YO female with PMH asthma, HTN, dementia p/w 2-3 days of lethargy and weakness found to be hypotensive, hypothermic, and COVID+ with RLL infiltrate admitted to the ICU for septic shock requiring pressors. Now with WBC of 13, ALP of 13.33, AST 95, elevated inflammatory markers, intermittent MAPs in 30's/40's and distended gall bladder w/ wall thickening c/f possible biliary sepsis.    Neuro:   q1 hr neuro checks    Resp:   % sat on RA    ID:   Cont Remdesivir + Decadron for COVID PNA  Zosyn for possible biliary sepsis    CVS:   Levophed to maintain SBP >100 and MAP >65    GI:  distended, thickened gallbladder with sludge and CBD 9mm (normal for age)  Zosyn for biliary coverage  f/u HIDA scan    Renal/Electrolytes:   Na 150, c/w D5 glucose  BUN/Cr improving with adequate UO; continue to monitor    FEN:   failed speech and swallow  NPO for now    HEME:   DVT prophylaxis with sq Heparin  Thrombocytopenia; trend platelets    Full code, GOC discussion with family   89 YO female with PMH asthma, HTN, dementia p/w 2-3 days of lethargy and weakness found to be hypotensive, hypothermic, and COVID+ with RLL infiltrate admitted to the ICU for septic shock requiring pressors. Now with WBC of 13.22, ALP of 429, AST 95, elevated inflammatory markers, intermittent MAPs in 30's/40's and distended gall bladder w/ wall thickening c/f possible biliary sepsis.    Neuro:   q1 hr neuro checks    Resp:   % sat on RA    ID:   Cont Remdesivir + Decadron for COVID PNA  Zosyn for possible biliary sepsis    CVS:   Levophed to maintain SBP >100 and MAP >65    GI:  distended, thickened gallbladder with sludge and CBD 9mm (normal for age)  Zosyn for biliary coverage  f/u HIDA scan    Renal/Electrolytes:   Na 150, c/w D5 glucose  BUN/Cr improving with adequate UO; continue to monitor    FEN:   failed speech and swallow  NPO for now    HEME:   DVT prophylaxis with sq Heparin  Thrombocytopenia; trend platelets    Full code, GOC discussion with family

## 2022-02-18 NOTE — PROGRESS NOTE ADULT - SUBJECTIVE AND OBJECTIVE BOX
HPI: 89 YO female with PMH asthma (not on meds), HTN (not on meds), dementia (baseline is that pt can walk and talk but needs assistance with all ADLs), presents for lethargy, weakness, FTT over the last 2-3 days. Pt received first dose Pfizer 2 weeks ago.  In the ED, pt found to be COVID positive with labs significant for lactate 6.8, creatinine 1.5, sodium 147.  CT head negative for acute pathology.  CXR with patchy opacity of right lung.  Pt was given 1.5L of IVF and remained hypotensive peripheral levophed started. Admitted to ICU for septic shock 2/2 COVID pneumonia.    ON Events: pt became benjamín to the 20's ~3am, given 0.5 L bolus. Pt also became agitated at 9pm last night, grabbing at nurses, given 2.5 mg haldol.     Physical Exam:    T(C): 35.6 (02-18-22 @ 08:00), Max: 37.4 (02-17-22 @ 12:30)  HR: 99 (02-18-22 @ 08:00) (27 - 99)  BP: 115/51 (02-18-22 @ 08:00) (99/43 - 146/66)  RR: 21 (02-18-22 @ 08:00) (18 - 32)  SpO2: 100% (02-18-22 @ 08:00) (95% - 100%)    CONSTITUTIONAL: lethargic, A&O x 0  ENMT: Oral mucosa with moist membranes. No external nasal lesions; nasal mucosa not inflamed; normal dentition; no pharyngeal injection or exudates. Otoscopic exam with normal tympanic membranes; no gross hearing impairment noted.  	NECK: Supple, symmetric and without tracheal deviation; thyroid gland not enlarged and without palpable masses    RESPIRATORY: No respiratory distress, no use of accessory muscles; CTA b/l, no wheezes, rales or rhonchi, no dullness or hyperresonance to percussion, no tactile fremitus, no subcutaneous emphysema    CARDIOVASCULAR: RRRR, +S1S2, no murmurs, no rubs, no gallops; no JVD; no peripheral edema  	Vascular: no carotid bruits; no abdominal bruit; carotid pulse palpable, radial pulse palpable, femoral pulse palpable, dorsalis pedis pulse palpable, posterior tibialis pulse palpable    GASTROINTESTINAL: Soft, non tender, non distended, no rebound, no guarding; No palpable masses; no hepatosplenomegaly; no hernia palpated;  	Rectal: normal sphincter tone and no masses palpated; stool negative for blood    GENITOURINARY:  	MALE: Normal appearing external genitalia, no penile lesion; no palpable testicular or scrotal mass; prostate not enlarged and without palpable nodule   	Breasts: Breasts symmetric, no nipple discharge; palpation without masses, lumps, or focal tenderness    	FEMALE: Normal appearing external genitalia, no vaginal discharge or lesion noted; examination of urethra with no abnormalities; bladder without fullness or tenderness on palpation; cervix visualized, without lesion or discharge; palpation of uterus and adnexa without tenderness or mass   	Breasts: Breasts symmetric, no nipple discharge; palpation without masses, lumps, or focal tenderness    LYMPHATIC: No cervical LAD or tenderness; no axillary LAD or tenderness; no inguinal LAD or tenderness    MUSCULOSKELETAL: Normal gait and station; no digital clubbing or cyanosis; examination of the (head/neck, spine/ribs/pelvis, RUE, LUE, RLE, LLE) without misalignment, normal range of motion without pain, no spinal tenderness, normal muscle strength/tone    SKIN: No rashes or ulcers noted; no subcutaneous nodules or induration palpable    NEUROLOGIC: CN II-XII intact; normal reflexes in upper and lower extremities, sensation intact in upper and lower extremities b/l to light touch; Babinski down b/l; no Kernig’s sign, no Brudzinski’s sign    PSYCHIATRIC: Appropriate insight/judgment; A+O x 3, mood and affect appropriate, recent/remote memory intact  Labs:                        8.8    13.22 )-----------( 75       ( 18 Feb 2022 04:16 )             26.7     150<H>  |  117<H>  |  37<H>  ----------------------------<  113<H>  4.3   |  26  |  1.22    Ca    9.0      18 Feb 2022 04:16  Phos  2.9     02-18  Mg     2.4     02-18    TPro  5.8<L>  /  Alb  1.6<L>  /  TBili  1.2  /  DBili  x   /  AST  95<H>  /  ALT  46  /  AlkPhos  429<H>  02-18    Lactate Dehydrogenase, Serum: 338 U/L (02.17.22 @ 16:45)  C-Reactive Protein, Serum (02.17.22 @ 16:45): 293 mg/L  Procalcitonin, Serum (02.17.22 @ 16:45): 0.62  Ferritin, Serum: 914 ng/mL (02.17.22 @ 15:41)       HPI: 89 YO female with PMH asthma (not on meds), HTN (not on meds), dementia (baseline is that pt can walk and talk but needs assistance with all ADLs), presents for lethargy, weakness, FTT over the last 2-3 days. Pt received first dose Pfizer 2 weeks ago.  In the ED, pt found to be COVID positive with labs significant for lactate 6.8, creatinine 1.5, sodium 147.  CT head negative for acute pathology.  CXR with patchy opacity of right lung.  Pt was given 1.5L of IVF and remained hypotensive peripheral levophed started. Admitted to ICU for septic shock 2/2 COVID pneumonia.    ON Events: pt became benjamín to the 20's ~3am, given 0.5 L bolus. Pt also became agitated at 9pm last night, grabbing at nurses, given 2.5 mg haldol.     Physical Exam:    T(C): 35.6 (02-18-22 @ 08:00), Max: 37.4 (02-17-22 @ 12:30)  HR: 99 (02-18-22 @ 08:00) (27 - 99)  BP: 115/51 (02-18-22 @ 08:00) (99/43 - 146/66)  RR: 21 (02-18-22 @ 08:00) (18 - 32)  SpO2: 100% (02-18-22 @ 08:00) (95% - 100%)    CONSTITUTIONAL: lethargic, A&O x 0  ENMT: Oral mucosa with moist membranes.  RESPIRATORY: No respiratory distress, no use of accessory muscles; CTA b/l, no wheezes, rales or rhonchi  CARDIOVASCULAR: RRRR, +S1S2, no murmurs, no rubs, no gallops  GASTROINTESTINAL: Soft, mildly tender diffusely    Labs:                        8.8    13.22 )-----------( 75       ( 18 Feb 2022 04:16 )             26.7     150<H>  |  117<H>  |  37<H>  ----------------------------<  113<H>  4.3   |  26  |  1.22    Ca    9.0      18 Feb 2022 04:16  Phos  2.9     02-18  Mg     2.4     02-18    TPro  5.8<L>  /  Alb  1.6<L>  /  TBili  1.2  /  DBili  x   /  AST  95<H>  /  ALT  46  /  AlkPhos  429<H>  02-18    Lactate Dehydrogenase, Serum: 338 U/L (02.17.22 @ 16:45)  C-Reactive Protein, Serum (02.17.22 @ 16:45): 293 mg/L  Procalcitonin, Serum (02.17.22 @ 16:45): 0.62  Ferritin, Serum: 914 ng/mL (02.17.22 @ 15:41)       HPI: 89 YO female with PMH asthma (not on meds), HTN (not on meds), dementia (baseline is that pt can walk and talk but needs assistance with all ADLs), presents for lethargy, weakness, FTT over the last 2-3 days. Pt received first dose Pfizer 2 weeks ago. In the ED, pt found to be COVID positive with labs significant for lactate 6.8, creatinine 1.5, sodium 147. CT head negative for acute pathology. CXR with patchy opacity of right lung. Pt was given 1.5L of IVF and remained hypotensive peripheral levophed started.  Admitted to ICU for septic shock 2/2 COVID pneumonia.    ON Events: pt became benjamín to the 20's ~3am, given 0.5 L bolus. Pt also became agitated at 9pm last night, grabbing at nurses, given 2.5 mg haldol.     Physical Exam:    T(C): 35.6 (02-18-22 @ 08:00), Max: 37.4 (02-17-22 @ 12:30)  HR: 99 (02-18-22 @ 08:00) (27 - 99)  BP: 115/51 (02-18-22 @ 08:00) (99/43 - 146/66)  RR: 21 (02-18-22 @ 08:00) (18 - 32)  SpO2: 100% (02-18-22 @ 08:00) (95% - 100%)    CONSTITUTIONAL: lethargic, A&O x 0  ENMT: Oral mucosa with moist membranes.  RESPIRATORY: No respiratory distress, no use of accessory muscles; CTA b/l, no wheezes, rales or rhonchi  CARDIOVASCULAR: RRRR, +S1S2, no murmurs, no rubs, no gallops  GASTROINTESTINAL: Soft, mildly tender diffusely    Labs:                        8.8    13.22 )-----------( 75       ( 18 Feb 2022 04:16 )             26.7     150<H>  |  117<H>  |  37<H>  ----------------------------<  113<H>  4.3   |  26  |  1.22    Ca    9.0      18 Feb 2022 04:16  Phos  2.9     02-18  Mg     2.4     02-18    TPro  5.8<L>  /  Alb  1.6<L>  /  TBili  1.2  /  DBili  x   /  AST  95<H>  /  ALT  46  /  AlkPhos  429<H>  02-18    Lactate Dehydrogenase, Serum: 338 U/L (02.17.22 @ 16:45)  C-Reactive Protein, Serum (02.17.22 @ 16:45): 293 mg/L  Procalcitonin, Serum (02.17.22 @ 16:45): 0.62  Ferritin, Serum: 914 ng/mL (02.17.22 @ 15:41)    Radiology:    ACC: [id number] EXAM:  US ABDOMEN LIMITED                        PROCEDURE DATE:  02/18/2022    INTERPRETATION:  CLINICAL INFORMATION: Elevated liver enzymes  COMPARISON: None available.  TECHNIQUE: Sonography of the right upper quadrant.  FINDINGS:    Liver: Within normal limits.  Bile ducts: Mild dilatation. Common bile duct measures 9 mm.  Gallbladder: Distended with cholelithiasis and sludge. Wall thickening.   Questionable focal tenderness noted by technologist.  Pancreas: Dilated duct measuring 4 mm.  Right kidney: 8.3 cm. No hydronephrosis.  Ascites: None.  IVC: Visualized portions are within normal limits.  Other: Right pleural effusion.    IMPRESSION:    Distended gallbladder with gallstones and sludge. Wall thickening present.  Questionable focal tenderness. Recommend nuclear medicine hepatobiliary   scan for further assessment.  Dilated biliary and pancreatic ducts.    --- End of Report ---

## 2022-02-19 NOTE — PROGRESS NOTE ADULT - SUBJECTIVE AND OBJECTIVE BOX
HPI:  89 YO female with PMH asthma (not on meds), HTN (not on meds), dementia (baseline is that pt can walk and talk but needs assistance with all ADLs), presents for lethargy, weakness, FTT over the last 2-3 days. Pt received first dose Pfizer 2 weeks ago.  In the ED, pt found to be COVID positive with labs significant for lactate 6.8, creatinine 1.5, sodium 147.  CT head negative for acute pathology.  CXR with patchy opacity of right lung.  Pt was given 1.5L of IVF and remained hypotensive peripheral levophed started.    Admit to ICU for septic shock 2/2 COVID pneumonia. (2022 04:24)      24 hr events:      ## Labs:  CBC:                        8.1    11.63 )-----------( 61       ( 2022 04:33 )             24.4     Chem:      147<H>  |  116<H>  |  34<H>  ----------------------------<  123<H>  4.3   |  26  |  1.30    Ca    8.5      2022 04:33  Phos  3.0       Mg     2.2         TPro  5.4<L>  /  Alb  1.4<L>  /  TBili  1.2  /  DBili  x   /  AST  54<H>  /  ALT  36  /  AlkPhos  254<H>      Coags:          ## Imaging:    ## Medications:  piperacillin/tazobactam IVPB.. 3.375 Gram(s) IV Intermittent <User Schedule>  remdesivir  IVPB 100 milliGRAM(s) IV Intermittent every 24 hours  remdesivir  IVPB   IV Intermittent     norepinephrine Infusion 0.048 MICROgram(s)/kG/Min IV Continuous <Continuous>      dexAMETHasone  Injectable 6 milliGRAM(s) IV Push daily    heparin   Injectable 5000 Unit(s) SubCutaneous every 12 hours          ## Vitals:  T(C): 36.4 (22 @ 15:47), Max: 36.7 (22 @ 19:27)  HR: 83 (22 @ 14:30) (60 - 100)  BP: 107/45 (22 @ 14:30) (82/37 - 145/83)  BP(mean): 61 (22 @ 14:30) (48 - 99)  RR: 23 (22 @ 14:30) (16 - 31)  SpO2: 95% (22 @ 14:30) (90% - 100%)  Wt(kg): --  Vent:   AB-18 @ 07:01  -   @ 07:00  --------------------------------------------------------  IN: 1833.8 mL / OUT: 550 mL / NET: 1283.8 mL     @ 07:01  -   @ 17:08  --------------------------------------------------------  IN: 554.5 mL / OUT: 110 mL / NET: 444.5 mL          ## P/E:  Gen: lying comfortably in bed in no apparent distress  Mouth:   Neck:  Lungs:   Heart:   Abd:  Ext:  Neuro:    CENTRAL LINE: [ ] YES [ ] NO  LOCATION:   DATE INSERTED:  REMOVE: [ ] YES [ ] NO      CABA: [ ] YES [ ] NO    DATE INSERTED:  REMOVE:  [ ] YES [ ] NO      A-LINE:  [ ] YES [ ] NO  LOCATION:   DATE INSERTED:  REMOVE:  [ ] YES [ ] NO  EXPLAIN:    GLOBAL ISSUE/BEST PRACTICE:  Analgesia:  Sedation:  HOB elevation: yes  Stress ulcer prophylaxis:  VTE prophylaxis:  Oral Care:  Glycemic control:  Nutrition:    CODE STATUS: [ ] full code  [ ] DNR  [ ] DNI  [ ] [facility]  Goals of care discussion: [ ] yes  HPI:  Pt is a 89 yo F with h/o asthma (not on meds), HTN (not on meds), dementia (baseline is that pt can walk and talk but needs assistance with all ADLs), presented 2 to lethargy, weakness, FTT over the last 2-3 days. Pt received first dose Pfizer 2 weeks ago. In the ER, pt found to be COVID positive and hypotensive with labs significant for lactate 6.8, creatinine 1.5, sodium 147. CXR with bibasilar infiltrates but is very rotated. Pt was given 1.5L of IVF and remained hypotensive peripheral Levophed started. ICU admitting dx: Septic shock 2 to biliary source vs Covid-19 PNA       ## Labs:  CBC:                        8.1    11.63 )-----------( 61       ( 2022 04:33 )             24.4     Chem:      147<H>  |  116<H>  |  34<H>  ----------------------------<  123<H>  4.3   |  26  |  1.30    Ca    8.5      2022 04:33  Phos  3.0       Mg     2.2         TPro  5.4<L>  /  Alb  1.4<L>  /  TBili  1.2  /  DBili  x   /  AST  54<H>  /  ALT  36  /  AlkPhos  254<H>      Coags:          ## Imaging:    ## Medications:  piperacillin/tazobactam IVPB.. 3.375 Gram(s) IV Intermittent <User Schedule>  remdesivir  IVPB 100 milliGRAM(s) IV Intermittent every 24 hours  remdesivir  IVPB   IV Intermittent     norepinephrine Infusion 0.048 MICROgram(s)/kG/Min IV Continuous <Continuous>      dexAMETHasone  Injectable 6 milliGRAM(s) IV Push daily    heparin   Injectable 5000 Unit(s) SubCutaneous every 12 hours          ## Vitals:  T(C): 36.4 (22 @ 15:47), Max: 36.7 (22 @ 19:27)  HR: 83 (22 @ 14:30) (60 - 100)  BP: 107/45 (22 @ 14:30) (82/37 - 145/83)  BP(mean): 61 (22 @ 14:30) (48 - 99)  RR: 23 (22 @ 14:30) (16 - 31)  SpO2: 95% (22 @ 14:30) (90% - 100%)  Wt(kg): --  Vent:   AB-18 @ 07:01  -   @ 07:00  --------------------------------------------------------  IN: 1833.8 mL / OUT: 550 mL / NET: 1283.8 mL     @ 07:01  -   @ 17:08  --------------------------------------------------------  IN: 554.5 mL / OUT: 110 mL / NET: 444.5 mL          ## P/E:  Gen: lying comfortably in bed in no apparent distress  Lungs: CTA/ No wheezes  Heart: RRR/ No murmur  Abd: Soft/+BS/ Non-tender  Ext: R UE edema  Neuro: Lethargic, minimally responsive    CENTRAL LINE: [ ] YES [ ] NO  LOCATION:   DATE INSERTED:  REMOVE: [ ] YES [ ] NO      CABA: [ ] YES [ ] NO    DATE INSERTED:  REMOVE:  [ ] YES [ ] NO      A-LINE:  [ ] YES [ ] NO  LOCATION:   DATE INSERTED:  REMOVE:  [ ] YES [ ] NO  EXPLAIN:    CODE STATUS: [x ] full code  [ ] DNR  [ ] DNI  [ ] [facility]  Goals of care discussion: [ ] yes  HPI:  Pt is a 89 yo F with h/o asthma (not on meds), HTN (not on meds), dementia (baseline is that pt can walk and talk but needs assistance with all ADLs), presented 2 to lethargy, weakness, FTT over the last 2-3 days. Pt received first dose Pfizer 2 weeks ago. In the ER, pt found to be COVID positive and hypotensive with labs significant for lactate 6.8, creatinine 1.5, sodium 147. CXR with bibasilar infiltrates but is very rotated. Pt was given 1.5L of IVF and remained hypotensive peripheral Levophed started. ICU admitting dx: Septic shock 2 to biliary source vs Covid-19 PNA       ## Labs:  CBC:                        8.1    11.63 )-----------( 61       ( 2022 04:33 )             24.4     Chem:      147<H>  |  116<H>  |  34<H>  ----------------------------<  123<H>  4.3   |  26  |  1.30    Ca    8.5      2022 04:33  Phos  3.0       Mg     2.2         TPro  5.4<L>  /  Alb  1.4<L>  /  TBili  1.2  /  DBili  x   /  AST  54<H>  /  ALT  36  /  AlkPhos  254<H>      Coags:          ## Imaging:    ## Medications:  piperacillin/tazobactam IVPB.. 3.375 Gram(s) IV Intermittent <User Schedule>  remdesivir  IVPB 100 milliGRAM(s) IV Intermittent every 24 hours  remdesivir  IVPB   IV Intermittent     norepinephrine Infusion 0.048 MICROgram(s)/kG/Min IV Continuous <Continuous>      dexAMETHasone  Injectable 6 milliGRAM(s) IV Push daily    heparin   Injectable 5000 Unit(s) SubCutaneous every 12 hours      ## Vitals:  T(C): 36.4 (22 @ 15:47), Max: 36.7 (22 @ 19:27)  HR: 83 (22 @ 14:30) (60 - 100)  BP: 107/45 (22 @ 14:30) (82/37 - 145/83)  BP(mean): 61 (22 @ 14:30) (48 - 99)  RR: 23 (22 @ 14:30) (16 - 31)  SpO2: 95% (22 @ 14:30) (90% - 100%)  Wt(kg): --  Vent:   AB-18 @ 07:01  -   @ 07:00  --------------------------------------------------------  IN: 1833.8 mL / OUT: 550 mL / NET: 1283.8 mL     @ 07:01  -   @ 17:08  --------------------------------------------------------  IN: 554.5 mL / OUT: 110 mL / NET: 444.5 mL        ## P/E:  Gen: lying comfortably in bed in no apparent distress  Lungs: CTA/ No wheezes  Heart: RRR/ No murmur  Abd: Soft/+BS/ Non-tender  Ext: R UE edema  Neuro: Lethargic, minimally responsive    CENTRAL LINE: [ ] YES [ ] NO  LOCATION:   DATE INSERTED:  REMOVE: [ ] YES [ ] NO      CABA: [ ] YES [ ] NO    DATE INSERTED:  REMOVE:  [ ] YES [ ] NO      A-LINE:  [ ] YES [ ] NO  LOCATION:   DATE INSERTED:  REMOVE:  [ ] YES [ ] NO  EXPLAIN:    CODE STATUS: [x ] full code  [ ] DNR  [ ] DNI  [ ] [facility]  Goals of care discussion: [ ] yes

## 2022-02-19 NOTE — DIETITIAN INITIAL EVALUATION ADULT. - OTHER CALCULATIONS
Ht (cm): 167.6cm   Wt (kg): 47kg (02/18 daily weight)   BMI: 16.7     IBW: 58.9kg +/- 10% %IBW: 80% UBW: unknown %UBW: n/a

## 2022-02-19 NOTE — PROGRESS NOTE ADULT - SUBJECTIVE AND OBJECTIVE BOX
Patient seen and examined at bedside, no acute events per RN.  Does not answer any questions or offer any complaints.  Afebrile.    Vital Signs Last 24 Hrs  T(F): 97.6 (02-19-22 @ 15:47), Max: 99.6 (02-18-22 @ 16:38)  HR: 83 (02-19-22 @ 14:30)  BP: 107/45 (02-19-22 @ 14:30)  RR: 23 (02-19-22 @ 14:30)  SpO2: 95% (02-19-22 @ 14:30)    PHYSICAL EXAM:  GENERAL: Alert, NAD  CHEST/LUNG: Clear to auscultation bilaterally, respirations nonlabored  HEART: Regular rate and rhythm; S1 & S2 appreciated  ABDOMEN: soft, softly tender throughout abdomen, non distended. No rebound, no guarding.   EXTREMITIES:  no calf tenderness, no edema    I&O's Detail    18 Feb 2022 07:01  -  19 Feb 2022 07:00  --------------------------------------------------------  IN:    dextrose 5%: 1500 mL    IV PiggyBack: 200 mL    Norepinephrine: 133.8 mL  Total IN: 1833.8 mL    OUT:    Indwelling Catheter - Urethral (mL): 550 mL  Total OUT: 550 mL    Total NET: 1283.8 mL    19 Feb 2022 07:01  -  19 Feb 2022 16:21  --------------------------------------------------------  IN:    dextrose 5%: 450 mL    IV PiggyBack: 100 mL    Norepinephrine: 4.5 mL  Total IN: 554.5 mL    OUT:    Indwelling Catheter - Urethral (mL): 110 mL  Total OUT: 110 mL    Total NET: 444.5 mL    LABS:                        8.1    11.63 )-----------( 61       ( 19 Feb 2022 04:33 )             24.4     02-19    147<H>  |  116<H>  |  34<H>  ----------------------------<  123<H>  4.3   |  26  |  1.30    Ca    8.5      19 Feb 2022 04:33  Phos  3.0     02-19  Mg     2.2     02-19    TPro  5.4<L>  /  Alb  1.4<L>  /  TBili  1.2  /  DBili  x   /  AST  54<H>  /  ALT  36  /  AlkPhos  254<H>  02-19    RADIOLOGY & ADDITIONAL STUDIES:  < from: NM Hepatobiliary Imaging (02.18.22 @ 18:00) >    ACC: [id number] EXAM:  NM HEPATOBILIARY IMG                          PROCEDURE DATE:  02/18/2022      INTERPRETATION:  RADIOPHARMACEUTICAL:  99mTc-Mebrofenin  DOSE: 3 mCi IV    CLINICAL INFORMATION: 89 year old female with elevated liver enzymes,  cholelithiasis,, referred to evaluate for acute cholecystitis    TECHNIQUE: Dynamic images of the anterior abdomen were obtained for 1   hour immediately following radiotracer injection. Static images of the   abdomen in the anterior projection wasalso obtained. This study is   limited and technically suboptimal due to patient motion.    COMPARISON: No previous hepatobiliary scan for comparison    FINDINGS: The study is limited due to patient motion. There is prompt   uptake of the injected radiotracer by the hepatocytes. The gallbladder is   first visualized at 50 minutes post tracer injection and bowel activity   by 25 minutes. There is normal tracer clearance from the liver by the end   of the study.    IMPRESSION: Limited hepatobiliaryscan.    No scan evidence of acute cholecystitis.    A/P  89F with h/o asthma HTN dementia admitted with septic shock presumed due to COVID PNA  HIDA negative    - no surgical intervention indicated  - IV abx  - continue CCU management and supportive care  - d/w Dr. Lao

## 2022-02-19 NOTE — DIETITIAN INITIAL EVALUATION ADULT. - ENTERAL
If unable to initiate PO diet consider EN as medically feasible: Vital AF 1.2 @ 30 increase as tolerated to 50 ml/hr x 24 hours to provide 1200 ml total volume, 1440 calories, 90 grams protein, 973 ml free water

## 2022-02-19 NOTE — DIETITIAN INITIAL EVALUATION ADULT. - PERTINENT LABORATORY DATA
02-19 Na147 mmol/L<H> Glu 123 mg/dL<H> K+ 4.3 mmol/L Cr  1.30 mg/dL BUN 34 mg/dL<H> 02-19 Phos 3.0 mg/dL 02-19 Alb 1.4 g/dL<L>

## 2022-02-19 NOTE — DIETITIAN INITIAL EVALUATION ADULT. - REASON
Unable to perform nutrition focused physical exam as pt on COVID isolation precautions and under covers

## 2022-02-19 NOTE — PROGRESS NOTE ADULT - ASSESSMENT
Pt is a 89 yo F with h/o asthma (not on meds), HTN (not on meds), dementia (baseline is that pt can walk and talk but needs assistance with all ADLs), presented 2 to lethargy, weakness, FTT over the last 2-3 days. Pt received first dose Pfizer 2 weeks ago. In the ER, pt found to be COVID positive and hypotensive with labs significant for lactate 6.8, creatinine 1.5, sodium 147. CXR with bibasilar infiltrates but is very rotated. Pt was given 1.5L of IVF and remained hypotensive peripheral Levophed started. ICU admitting dx: Septic shock 2 to biliary source vs Covid-19 PNA     Resp: Not requiring supplemental O2 today  ID: Cont Remdesivir + Decadron/ Start Zosyn for biliary sepsis/ F/u as per ID  CVS: Still requiring Levophed to maintain SBP >100/ Lactic acidosis has cleared  HEME: DVT prophylaxis with sq Heparin/ Thrombocytopenia; cont to trend platelets   FEN: NPO  GI: R UQ U/S: Distended gallbladder with gallstones and sludge. Wall thickening present. Questionable focal tenderness. Recommend nuclear medicine hepatobiliary scan for further assessment. Dilated biliary and pancreatic ducts/ HIDA scan today/ Surg consult  Renal: DARREN 2 to ATN; gentle hydration and follow BUN/Cr and UO  Social: Full code .    Pt is a 89 yo F with h/o asthma (not on meds), HTN (not on meds), dementia (baseline is that pt can walk and talk but needs assistance with all ADLs), presented 2 to lethargy, weakness, FTT over the last 2-3 days. Pt received first dose Pfizer 2 weeks ago. In the ER, pt found to be COVID positive and hypotensive with labs significant for lactate 6.8, creatinine 1.5, sodium 147. CXR with bibasilar infiltrates but is very rotated. Pt was given 1.5L of IVF and remained hypotensive peripheral Levophed started. ICU admitting dx: Septic shock 2 to biliary source vs Covid-19 PNA     Resp: Not requiring supplemental O2  ID: Cont Remdesivir + Decadron/ For now cont empiric Zosyn for biliary sepsis/ F/u as per ID  CVS: Levophed to maintain SBP >100 and start Midodrine/ Lactic acidosis has cleared/ R UE venous duplex  HEME: DVT prophylaxis with sq Heparin/ Thrombocytopenia; cont to trend platelets   FEN: Severe protein-calorie malnutrition: place NGT and start enteral feeds/ IVF for hypernatremia until feeds at goal rate/ Follow Na  GI: R UQ U/S: Distended gallbladder with gallstones and sludge. Wall thickening present. Questionable focal tenderness. Dilated biliary and pancreatic ducts/ HIDA: No scan evidence of acute cholecystitis/ Surg consult and f/u noted  Renal: DARREN 2 to ATN; gentle hydration and follow BUN/Cr and UO  Neuro: Lethargy probable toxic/ metabolic encephalopathy  Social: Full code

## 2022-02-19 NOTE — DIETITIAN INITIAL EVALUATION ADULT. - OTHER INFO
Per chart pt with PMH dementia, HTN, asthma presents with AMS, hypotension, found with septic shock secondary to COVID PNA. Course complicated by cholecystitis. Pt currently on room air; remains confused as per flow sheets.  Pt is NPO x 2 days. Per pt profile pt with difficulty swallowing/chewing.   RD remains available.

## 2022-02-19 NOTE — DIETITIAN NUTRITION RISK NOTIFICATION - ADDITIONAL COMMENTS/DIETITIAN RECOMMENDATIONS
Swallow evaluation.  If unable to initiate PO diet consider EN as medically feasible: Vital AF 1.2 @ 30 increase as tolerated to 50 ml/hr x 24 hours to provide 1200 ml total volume, 1440 calories, 90 grams protein, 973 ml free water

## 2022-02-19 NOTE — DIETITIAN INITIAL EVALUATION ADULT. - PERTINENT MEDS FT
MEDICATIONS  (STANDING):  chlorhexidine 2% Cloths 1 Application(s) Topical <User Schedule>  dexAMETHasone  Injectable 6 milliGRAM(s) IV Push daily  dextrose 5%. 1000 milliLiter(s) (75 mL/Hr) IV Continuous <Continuous>  heparin   Injectable 5000 Unit(s) SubCutaneous every 12 hours  norepinephrine Infusion 0.048 MICROgram(s)/kG/Min (4.26 mL/Hr) IV Continuous <Continuous>  piperacillin/tazobactam IVPB.. 3.375 Gram(s) IV Intermittent <User Schedule>  remdesivir  IVPB 100 milliGRAM(s) IV Intermittent every 24 hours  remdesivir  IVPB   IV Intermittent

## 2022-02-20 NOTE — PROGRESS NOTE ADULT - SUBJECTIVE AND OBJECTIVE BOX
HPI:  Pt is a 89 yo F with h/o asthma (not on meds), HTN (not on meds), dementia (baseline is that pt can walk and talk but needs assistance with all ADLs), presented 2 to lethargy, weakness, FTT over the last 2-3 days. Pt received first dose Pfizer 2 weeks ago. In the ER, pt found to be COVID positive and hypotensive with labs significant for lactate 6.8, creatinine 1.5, sodium 147. CXR with bibasilar infiltrates but is very rotated. Pt was given 1.5L of IVF and remained hypotensive peripheral Levophed started. ICU admitting dx: Septic shock 2 to biliary source vs Covid-19 PNA       ## Labs:  CBC:                        7.3    10.26 )-----------( 75       ( 2022 02:48 )             22.3     Chem:      142  |  112<H>  |  35<H>  ----------------------------<  150<H>  3.3<L>   |  25  |  1.13    Ca    8.1<L>      2022 02:48  Phos  2.8       Mg     2.7         TPro  5.0<L>  /  Alb  1.3<L>  /  TBili  0.8  /  DBili  x   /  AST  28  /  ALT  30  /  AlkPhos  188<H>      Coags:          ## Imaging:    ## Medications:  piperacillin/tazobactam IVPB.. 3.375 Gram(s) IV Intermittent <User Schedule>  remdesivir  IVPB 100 milliGRAM(s) IV Intermittent every 24 hours  remdesivir  IVPB   IV Intermittent     midodrine 10 milliGRAM(s) Oral every 8 hours  norepinephrine Infusion 0.048 MICROgram(s)/kG/Min IV Continuous <Continuous>      dexAMETHasone  Injectable 6 milliGRAM(s) IV Push daily    heparin   Injectable 5000 Unit(s) SubCutaneous every 12 hours          ## Vitals:  T(C): 36.1 (22 @ 04:00), Max: 36.1 (22 @ 04:00)  HR: 71 (22 @ 11:00) (39 - 92)  BP: 133/55 (22 @ 11:00) (69/37 - 154/71)  BP(mean): 75 (22 @ 11:00) (43 - 115)  RR: 14 (22 @ 11:00) (13 - 27)  SpO2: 100% (22 @ 11:00) (90% - 100%)  Wt(kg): --  Vent:   AB-19 @ 07:01  -   @ 07:00  --------------------------------------------------------  IN: 1620.7 mL / OUT: 900 mL / NET: 720.7 mL          ## P/E:  Gen: lying comfortably in bed in no apparent distress  Lungs: CTA/ No wheezes  Heart: RRR/ No murmur  Abd: Soft/+BS/ Non-tender  Neuro: Today, more awake, alert    CENTRAL LINE: [ ] YES [ ] NO  LOCATION:   DATE INSERTED:  REMOVE: [ ] YES [ ] NO      CABA: [ ] YES [ ] NO    DATE INSERTED:  REMOVE:  [ ] YES [ ] NO      A-LINE:  [ ] YES [ ] NO  LOCATION:   DATE INSERTED:  REMOVE:  [ ] YES [ ] NO  EXPLAIN:    CODE STATUS: [x ] full code  [ ] DNR  [ ] DNI  [ ] MOLST  Goals of care discussion: [ ] yes

## 2022-02-20 NOTE — PROGRESS NOTE ADULT - SUBJECTIVE AND OBJECTIVE BOX
SHAMAR GUILLERMO  MRN-[id number]      Follow Up:  covid     Interval History: patient seen and examined. She is more alert and awake today but asking how to get a bus to [place]. Denied pain but limited ROS    ROS:    [X ] Unobtainable because: poor mental status   [ ] All other systems negative except as noted          Allergies  No Known Allergies        ANTIMICROBIALS:    piperacillin/tazobactam IVPB.. 3.375 <User Schedule>  remdesivir  IVPB 100 every 24 hours  remdesivir  IVPB        MEDICATIONS  (STANDING):  dexAMETHasone  Injectable 6 daily  heparin   Injectable 5000 every 12 hours  midodrine 10 every 8 hours  norepinephrine Infusion 0.048 <Continuous>      PRN      Physical Exam:  Vital Signs Last 24 Hrs  T(F): 95.7 (22 @ 16:00), Max: 97 (22 @ 04:00)  HR: 56 (22 @ 18:30)  BP: 56/48 (22 @ 18:30)  RR: 25 (22 @ 18:30)  SpO2: 100% (22 @ 17:30) (90% - 100%)  Wt(kg): --    General:    NAD,  non toxic, on room air   Head: atraumatic, normocephalic  Eye: normal sclera and conjunctiva  ENT:    no oral lesions, neck supple  Cardio:     regular S1, S2,  no murmur  Respiratory:    clear b/l,    no wheezing  abd:     soft,   BS +,   no tenderness today   : no SPT  Musculoskeletal:   no joint swelling,   no edema  vascular: no central lines, +PIV   Skin:    no rash  Neurologic:    poor mental status but awake and responds to simple commands   psych: difficult to assess     WBC Count: 10.26 K/uL ( @ 02:48)  WBC Count: 11.63 K/uL ( @ 04:33)  WBC Count: 13.22 K/uL ( @ 04:16)  WBC Count: 9.46 K/uL ( @ 10:28)  WBC Count: 5.22 K/uL ( @ 00:24)                            7.3    10.26 )-----------( 75       ( 2022 02:48 )             22.3           142  |  112<H>  |  35<H>  ----------------------------<  150<H>  3.3<L>   |  25  |  1.13    Ca    8.1<L>      2022 02:48  Phos  2.8       Mg     2.7         TPro  5.0<L>  /  Alb  1.3<L>  /  TBili  0.8  /  DBili  x   /  AST  28  /  ALT  30  /  AlkPhos  188<H>        Creatinine Trend: 1.13<--, 1.30<--, 1.22<--, 1.15<--, 1.52<--        Urinalysis Basic - ( 2022 01:52 )    Color: Yellow / Appearance: Clear / S.010 / pH: x  Gluc: x / Ketone: Trace  / Bili: Negative / Urobili: 1 mg/dL   Blood: x / Protein: 100 mg/dL / Nitrite: Negative   Leuk Esterase: Negative / RBC: 3-5 /HPF / WBC 0-2   Sq Epi: x / Non Sq Epi: Few / Bacteria: Many        Creatinine Trend: 1.22<--, 1.15<--, 1.52<--  Lactate, Blood: 1.3 mmol/L (22 @ 10:28)  Lactate, Blood: 4.5 mmol/L (22 @ 01:52)  Lactate, Blood: 6.8 mmol/L (22 @ 00:24)      MICROBIOLOGY:  v  Clean Catch Clean Catch (Midstream)  22   No growth  --  --      .Blood Blood-Peripheral  22   No growth to date.  --  --      Rapid RVP Result: Detected ( @ 00:24)        C-Reactive Protein, Serum: 293 ()    Ferritin, Serum: 914 ()      D-Dimer Assay, Quantitative:  ()    Procalcitonin, Serum: 0.62 (22 @ 16:45)    SARS-CoV-2: Detected (22 @ 00:24)  Rapid RVP Result: Detected (22 @ 00:24)        RADIOLOGY:  < from: US Abdomen Limited (22 @ 10:43) >  IMPRESSION:    Distended gallbladder with gallstones and sludge. Wall thickening present.  Questionable focal tenderness. Recommend nuclear medicine hepatobiliary   scan for further assessment.  Dilated biliary and pancreatic ducts.

## 2022-02-20 NOTE — PROGRESS NOTE ADULT - ASSESSMENT
Pt is a 89 yo F with h/o asthma (not on meds), HTN (not on meds), dementia (baseline is that pt can walk and talk but needs assistance with all ADLs), presented 2 to lethargy, weakness, FTT over the last 2-3 days. Pt received first dose Pfizer 2 weeks ago. In the ER, pt found to be COVID positive and hypotensive with labs significant for lactate 6.8, creatinine 1.5, sodium 147. CXR with bibasilar infiltrates but is very rotated. Pt was given 1.5L of IVF and remained hypotensive peripheral Levophed started. ICU admitting dx: Septic shock 2 to biliary source vs Covid-19 PNA     Resp: Not requiring supplemental O2  ID: Cont Remdesivir + Decadron/ Cont empiric Zosyn 5-7 days/ F/u as per ID  CVS: Levophed to maintain SBP >100 and start Midodrine/ Lactic acidosis has cleared/ R UE venous duplex: No evidence of deep venous thrombosis in either upper extremity.  HEME: DVT prophylaxis with sq Heparin/ Thrombocytopenia; cont to trend platelets   FEN: Severe protein-calorie malnutrition: evaluate for po diet and cont IVF/ Follow Na; hypernatremia improving/ Replace K; pt hypokalemic  GI: R UQ U/S: Distended gallbladder with gallstones and sludge. Wall thickening present. Questionable focal tenderness. Dilated biliary and pancreatic ducts/ HIDA: No scan evidence of acute cholecystitis/ Surg f/u prn  Renal: DARREN 2 to ATN; gentle hydration and follow BUN/Cr and UO  Neuro: MS improved today  Social: Full code

## 2022-02-20 NOTE — PROGRESS NOTE ADULT - ASSESSMENT
89 YO female with PMH asthma (not on meds), HTN (not on meds), dementia (baseline is that pt can walk and talk but needs assistance with all ADLs), presents for lethargy, weakness, FTT over the last 2-3 days. Pt received first dose Pfizer 2 weeks ago.  In the ED, pt found to be COVID positive with labs significant for lactate 6.8, creatinine 1.5, sodium 147.  CT head negative for acute pathology.  CXR with patchy opacity of right lung.  Pt was given 1.5L of IVF and remained hypotensive peripheral levophed started.  Admit to ICU for septic shock 2/2 COVID pneumonia  asked to evaluate for possible tocilizumab. She came in and documentation shows 100% on room air then she was placed on NRB but since then she had been weaned to venti mask.  Her platelets are low and close to the threshold of 50k   she had a very high lactate and there may be a develop bacterial process which would exclude her from receiving tocilizumab     2/18: patient has been weaned off oxygen successfully , has elevated alk phos and tender abdomen so US was done and shows possible acute cholecystitis started on zosyn, deemed not a surgical candidate, if necessary can get perc cholecystotomy tube, HIDA scan pending    2/20: HIDA negative, pain is improved, more awake, LFTs normal, will continue zosyn for total of 5-7 days      COVID  acute cholecystitis   ARF   functional quadriplegia   high serum lactate    Plan:    #COVID  Monitor clinically.  Monitor Oxygenation  O2 supplementation if needed   Remdesivir x5 days   Monitor CBC, CMP daily  Ferritin, CRP, and D dimer q 48 hrs.  IV Dexamethasone 6mg q24hrs x10 days if hypoxia.  Anticoagulation per protocol.  Treatment options are limited  Monitor for any bacterial superinfection/complications.  This tx plan was formulated utilizing my clinical judgement, currently available local/regional anecdotal information, organizational treatment recommendations with COVID-19 specific considerations given rapidly changing information available.   not a candidate for tocilizumab at this kal     #Acute Respiratory Failure- resolved   chest PT  routine PT  OOB to chair if able   decadron 6mg q24hrs for 10 days only if hypoxic   Inhalers-MDI and avoid nebulizers except in negative pressure room     #high serum lactate-resolved    send lactate if worsens   pressors per icu   started on zosyn for acute cholecystitis     #acute cholecystitis   surgery recommendations appreciated   zosyn for 5-7 days   trend LFTs   perc choley if necessary     D/W CCU team    Alin Resendez DO  Infectious Disease Attending  Reachable via Microsoft Teams or ID office: [phone]  After 5pm/weekends please call [phone] for all inquiries and new consults

## 2022-02-21 NOTE — PROGRESS NOTE ADULT - ASSESSMENT
89F PMH asthma (not on meds), HTN (not on meds), dementia (baseline can walk and talk but needs assistance with all ADLs), presents for lethargy and weakness with decreased PO intake over 2-3 days. s/p 1st dose of Pfizer COVID vaccination 2 weeks prior. Hypotensive in ED, lactate 6.8, Cr 1.5, sodium 147. CXR with bibasilar infiltrates. Tested + for COVID. Admitted to ICU for severe sepsis/septic shock, lactic acidosis, DARREN with ATN, COVID PNA, hypernatremia, acute metabolic encephalopathy. Now with gram + cocci in clusters in blood cx (gram + bacteremia). Course also with bradycardia HR 30-40s. Found to have low TSH and low T3 T4    NEURO  lethargic but arousable  reported by nursing staff to be more awake and alert yesterday  will cont to monitor    CV  has been on levophed for septic shock  however BP now improved but remains bradycardic to the 30s-40s  taken off midodrine due to bradycardia  will give dopamine a trial with persistent bradycardia, wean off levophed as BP tolerates  ? bradyarrhythmia due to conduction disease with advanced age vs conduction issues with underlying COVID infection  will get 2D echo  will get cardiology input: ? need for pacemaker - however likely will need acute active infectious process controlled first    PULM  COVID: s/p 5 day course of remdesivir  cont on 10 day course of dexamethasone  nasal cannula O2 supplementation as needed  maintain O2 sat > 90%    RENAL  renal function improved  hypernatremia resolved: on D5 LR @ 50cc/hr    ID  blood cx with gram positive cocci in clusters  follow up cultures results  will repeat blood cx  initial concerns for biliary source of infection with negative HIDA work up- acute cholecystitis ruled out  empirically on zosyn, will complete a 5 day course     ENDO  pt with low TSH but also with low T3 T4  if hyperthyroid, anticipate elevation in T3, T4 but pt with low T3 T4  pt's symptoms also not consistent with hyperthyroidism, if anything probably more consistent with hypothyroidism?  will get endo input/eval    GEN  DVT prophylaxis: heparin sc  full code

## 2022-02-21 NOTE — PROGRESS NOTE ADULT - SUBJECTIVE AND OBJECTIVE BOX
24 hr events:  bradycardic to the 30s and 40s this morning  on levophed  lethargic  off midodrine due to bradycardia    ## ROS:  [x ] unable to obtain due to lethargy, acute metabolic encephalopathy       ## Labs:  CBC:                        7.4    10.41 )-----------( 116      ( 21 Feb 2022 02:30 )             21.2     Chem:  02-21    142  |  113<H>  |  35<H>  ----------------------------<  148<H>  4.0   |  24  |  1.11    Ca    8.1<L>      21 Feb 2022 02:10  Phos  2.5     02-21  Mg     2.6     02-21    TPro  5.2<L>  /  Alb  1.8<L>  /  TBili  0.8  /  DBili  x   /  AST  28  /  ALT  26  /  AlkPhos  138<H>  02-21    Culture - Urine (02.17.22 @ 09:53)    Specimen Source: Clean Catch Clean Catch (Midstream)    Culture Results: No growth    Culture - Blood (02.17.22 @ 09:03)    Specimen Source: .Blood Blood-Peripheral    Culture Results: No growth to date.    Culture - Blood (02.17.22 @ 09:03)    Gram Stain: Growth in aerobic bottle: Gram Positive Cocci in Clusters    Specimen Source: .Blood Blood-Peripheral    Culture Results: Growth in aerobic bottle: Gram Positive Cocci in Clusters    Respiratory Viral Panel with COVID-19 by STEPHANE (02.17.22 @ 00:24)    Rapid RVP Result: Detected    SARS-CoV-2: Detected      ## Imaging:  CXR < from: Xray Chest 1 View- PORTABLE-Urgent (02.17.22 @ 00:39) >  Bibasilar infiltrates.    HIDA < from: NM Hepatobiliary Imaging (02.18.22 @ 18:00) >  No scan evidence of acute cholecystitis.    Abdominal US < from: US Abdomen Limited (02.18.22 @ 10:43) >  Distended gallbladder with gallstones and sludge. Wall thickening present.  Questionable focal tenderness. Recommend nuclear medicine hepatobiliary scan for further assessment.  Dilated biliary and pancreatic ducts.      upper extremity dopplers < from: US Duplex Venous Upper Ext Complete, Bilateral (02.19.22 @ 19:28) >  No evidence of deep venous thrombosis in either upper extremity.        ## Medications:  piperacillin/tazobactam IVPB.. 3.375 Gram(s) IV Intermittent <User Schedule>    DOPamine Infusion 5 MICROgram(s)/kG/Min IV Continuous <Continuous>      dexAMETHasone  Injectable 6 milliGRAM(s) IV Push daily    heparin   Injectable 5000 Unit(s) SubCutaneous every 12 hours          ## Vitals:  T(C): 36.3 (02-21-22 @ 16:00), Max: 37.3 (02-21-22 @ 08:00)  HR: 67 (02-21-22 @ 20:30) (39 - 93)  BP: 176/57 (02-21-22 @ 20:00) (87/36 - 176/57)  BP(mean): 84 (02-21-22 @ 20:00) (48 - 146)  RR: 17 (02-21-22 @ 20:30) (12 - 27)  SpO2: 93% (02-21-22 @ 20:30) (91% - 99%)      02-20 @ 07:01  -  02-21 @ 07:00  --------------------------------------------------------  IN: 1204.9 mL / OUT: 810 mL / NET: 394.9 mL    02-21 @ 07:01  -  02-21 @ 20:35  --------------------------------------------------------  IN: 775.8 mL / OUT: 300 mL / NET: 475.8 mL      ## P/E:  Gen: lying comfortably in bed in no apparent distress, lethargic  HEENT: NC/AT  Resp: CTA B/L , no wheeze, no rales  CVS: bradycardic  Abd: soft NT/ND +BS  Ext: bilateral UE edema  Neuro: lethargic, moans to tactile stimuli, yelled "stop it" when palpating feet    CENTRAL LINE: [ ] YES [x ] NO  LOCATION:   DATE INSERTED:  REMOVE: [ ] YES [ ] NO      CABA: [ ] YES [ ] NO    DATE INSERTED:  REMOVE:  [ ] YES [ ] NO      A-LINE:  [ ] YES [x ] NO  LOCATION:   DATE INSERTED:  REMOVE:  [ ] YES [ ] NO  EXPLAIN:    GLOBAL ISSUE/BEST PRACTICE:  Analgesia: n/a  Sedation: n/a  HOB elevation: yes  Stress ulcer prophylaxis: n/a  VTE prophylaxis: heparin sc  Oral Care: n/a  Glycemic control: yes  Nutrition: po diet as tolerates    CODE STATUS: [x ] full code  [ ] DNR  [ ] DNI  [ ] MOLST  Goals of care discussion: [ ] yes

## 2022-02-21 NOTE — CONSULT NOTE ADULT - SUBJECTIVE AND OBJECTIVE BOX
CHIEF COMPLAINT:  Patient is a 89y old  Female who presents with a chief complaint of hypotension, COVID pneumonia (20 Feb 2022 18:34)      HPI:  89 YO female with PMH asthma (not on meds), HTN (not on meds), dementia (baseline is that pt can walk and talk but needs assistance with all ADLs), presents for lethargy, weakness, FTT over the last 2-3 days. Pt received first dose Pfizer 2 weeks ago.  In the ED, pt found to be COVID positive with labs significant for lactate 6.8, creatinine 1.5, sodium 147.  CT head negative for acute pathology.  CXR with patchy opacity of right lung.  Pt was given 1.5L of IVF and remained hypotensive peripheral levophed started.    Admit to ICU for septic shock 2/2 COVID pneumonia. (17 Feb 2022 04:24)    ALLERGIES:  No Known Allergies      Home Medications:    PAST MEDICAL & SURGICAL HISTORY:  HTN (hypertension)    Asthma    Dementia    No significant past surgical history          FAMILY HISTORY:  Family history of cancer (Sibling)  Cousin        SOCIAL HISTORY:    REVIEW OF SYSTEMS:  General:  No wt loss, fevers, chills, night sweats  Eyes:  Good vision, no reported pain  ENT:  No sore throat, pain, runny nose, dysphagia  CV:  No pain, palpitations, hypo/hypertension  Resp:  No dyspnea, cough, tachypnea, wheezing  GI:  No pain, nausea, vomiting, diarrhea, constipation  :  No pain, bleeding, incontinence, nocturia  Muscle:  No pain, weakness  Breast:  No pain, abscess, mass, discharge  Neuro:  No weakness, tingling, memory problems  Psych:  No fatigue, insomnia, mood problems, depression  Endocrine:  No polyuria, polydipsia, cold/heat intolerance  Heme:  No petechiae, ecchymosis, easy bruisability  Skin:  No rash, tattoos, scars, edema    PHYSICAL EXAM:  Vital Signs:  Vital Signs Last 24 Hrs  T(C): 37.2 (21 Feb 2022 04:00), Max: 37.2 (21 Feb 2022 04:00)  T(F): 99 (21 Feb 2022 04:00), Max: 99 (21 Feb 2022 04:00)  HR: 40 (21 Feb 2022 12:30) (39 - 93)  BP: 142/51 (21 Feb 2022 12:30) (56/48 - 157/41)  BP(mean): 72 (21 Feb 2022 12:30) (50 - 113)  RR: 16 (21 Feb 2022 12:30) (12 - 27)  SpO2: 97% (21 Feb 2022 12:30) (76% - 100%)  I&O's Summary    20 Feb 2022 07:01  -  21 Feb 2022 07:00  --------------------------------------------------------  IN: 1204.9 mL / OUT: 810 mL / NET: 394.9 mL    21 Feb 2022 07:01  -  21 Feb 2022 14:22  --------------------------------------------------------  IN: 345 mL / OUT: 0 mL / NET: 345 mL      I&O's Detail    20 Feb 2022 07:01  -  21 Feb 2022 07:00  --------------------------------------------------------  IN:    dextrose 5% + lactated ringers: 1150 mL    Norepinephrine: 54.9 mL  Total IN: 1204.9 mL    OUT:    Indwelling Catheter - Urethral (mL): 810 mL  Total OUT: 810 mL    Total NET: 394.9 mL      21 Feb 2022 07:01  -  21 Feb 2022 14:22  --------------------------------------------------------  IN:    dextrose 5% + lactated ringers: 300 mL    DOPamine Infusion: 9 mL    Norepinephrine: 36 mL  Total IN: 345 mL    OUT:  Total OUT: 0 mL    Total NET: 345 mL          Tele:     Constitutional: well developed, normal appearance, well groomed, well nourished, no deformities and no acute distress.   Eyes: the conjunctiva exhibited no abnormalities and the eyelids demonstrated no xanthelasmas.   HEENT: normal oral mucosa, no oral pallor and no oral cyanosis.   Neck: normal jugular venous A waves present, normal jugular venous V waves present and no jugular venous bravo A waves.   Pulmonary: no respiratory distress, normal respiratory rhythm and effort, no accessory muscle use and lungs were clear to auscultation bilaterally.   Cardiovascular: heart rate and rhythm were normal, normal S1 and S2 and no murmur, gallop, rub, heave or thrill are present.   Abdomen: soft, non-tender, no hepato-splenomegaly and no abdominal mass palpated.   Musculoskeletal: the gait could not be assessed..   Extremities: no clubbing of the fingernails, no localized cyanosis, no petechial hemorrhages and no ischemic changes.   Skin: normal skin color and pigmentation, no rash, no venous stasis, no skin lesions, no skin ulcer and no xanthoma was observed.   Psychiatric: oriented to person, place, and time, the affect was normal, the mood was normal and not feeling anxious.      LABORATORY:                          7.4    10.41 )-----------( 116      ( 21 Feb 2022 02:30 )             21.2     02-21    142  |  113<H>  |  35<H>  ----------------------------<  148<H>  4.0   |  24  |  1.11    Ca    8.1<L>      21 Feb 2022 02:10  Phos  2.5     02-21  Mg     2.6     02-21    TPro  5.2<L>  /  Alb  1.8<L>  /  TBili  0.8  /  DBili  x   /  AST  28  /  ALT  26  /  AlkPhos  138<H>  02-21          CAPILLARY BLOOD GLUCOSE        LIVER FUNCTIONS - ( 21 Feb 2022 02:10 )  Alb: 1.8 g/dL / Pro: 5.2 gm/dL / ALK PHOS: 138 U/L / ALT: 26 U/L / AST: 28 U/L / GGT: x               BNP    IMAGING:    ASSESSMENT:  89 YO female with PMH asthma (not on meds), HTN (not on meds), dementia (baseline is that pt can walk and talk but needs assistance with all ADLs), presents for lethargy, weakness, FTT over the last 2-3 days. Pt received first dose Pfizer 2 weeks ago.  In the ED, pt found to be COVID positive with labs significant for lactate 6.8, creatinine 1.5, sodium 147.  CT head negative for acute pathology.  CXR with patchy opacity of right lung.  Pt was given 1.5L of IVF and remained hypotensive peripheral levophed started.    Admit to ICU for septic shock 2/2 COVID pneumonia. (17 Feb 2022 04:24)      PLAN:     MEDICATIONS  (STANDING):  chlorhexidine 2% Cloths 1 Application(s) Topical <User Schedule>  dexAMETHasone  Injectable 6 milliGRAM(s) IV Push daily  dextrose 5% + lactated ringers. 1000 milliLiter(s) (50 mL/Hr) IV Continuous <Continuous>  DOPamine Infusion 5 MICROgram(s)/kG/Min (8.81 mL/Hr) IV Continuous <Continuous>  heparin   Injectable 5000 Unit(s) SubCutaneous every 12 hours  norepinephrine Infusion 0.048 MICROgram(s)/kG/Min (4.26 mL/Hr) IV Continuous <Continuous>  piperacillin/tazobactam IVPB.. 3.375 Gram(s) IV Intermittent <User Schedule>      Mik Nation MD, FACC, FASBERNA, FASNC, FACP  Director, Heart Failure Services  [facility]  , Department of Cardiology  [facility]     CHIEF COMPLAINT:  Patient is a 89y old  Female who presents with a chief complaint of hypotension, COVID pneumonia (20 Feb 2022 18:34)      HPI:  89-year-old with hypertension, dementia, admitted with failure to thrive, weakness, lethargy and Covid positive status with worsening lactate level and chest film showing patchy right lung opacity.  Placed on Levophed and given fluids for hypotension and admitted for COVID-19 pneumonia treatment.    ALLERGIES:  No Known Allergies      Home Medications:  unknown    PAST MEDICAL & SURGICAL HISTORY:  HTN (hypertension)    Asthma    Dementia    No significant past surgical history          FAMILY HISTORY:  Family history of cancer (Sibling)  Cousin        SOCIAL HISTORY:  unknown    REVIEW OF SYSTEMS:  unable    PHYSICAL EXAM:  Vital Signs:  Vital Signs Last 24 Hrs  T(C): 37.2 (21 Feb 2022 04:00), Max: 37.2 (21 Feb 2022 04:00)  T(F): 99 (21 Feb 2022 04:00), Max: 99 (21 Feb 2022 04:00)  HR: 40 (21 Feb 2022 12:30) (39 - 93)  BP: 142/51 (21 Feb 2022 12:30) (56/48 - 157/41)  BP(mean): 72 (21 Feb 2022 12:30) (50 - 113)  RR: 16 (21 Feb 2022 12:30) (12 - 27)  SpO2: 97% (21 Feb 2022 12:30) (76% - 100%)  I&O's Summary    20 Feb 2022 07:01  -  21 Feb 2022 07:00  --------------------------------------------------------  IN: 1204.9 mL / OUT: 810 mL / NET: 394.9 mL    21 Feb 2022 07:01  -  21 Feb 2022 14:22  --------------------------------------------------------  IN: 345 mL / OUT: 0 mL / NET: 345 mL      I&O's Detail    20 Feb 2022 07:01  -  21 Feb 2022 07:00  --------------------------------------------------------  IN:    dextrose 5% + lactated ringers: 1150 mL    Norepinephrine: 54.9 mL  Total IN: 1204.9 mL    OUT:    Indwelling Catheter - Urethral (mL): 810 mL  Total OUT: 810 mL    Total NET: 394.9 mL      21 Feb 2022 07:01  -  21 Feb 2022 14:22  --------------------------------------------------------  IN:    dextrose 5% + lactated ringers: 300 mL    DOPamine Infusion: 9 mL    Norepinephrine: 36 mL  Total IN: 345 mL    OUT:  Total OUT: 0 mL    Total NET: 345 mL      Tele: Sinus rhythm and sinus bradycardia.      Eyes: eyelids demonstrated no xanthelasmas.   HEENT: no oral cyanosis.   Neck: normal jugular venous A waves present, normal jugular venous V waves present and no jugular venous bravo A waves.   Pulmonary: no respiratory distress however, diminished breath sounds and scattered coarse rhonchi bilaterally.  Cardiovascular: heart rate and rhythm were normal, normal S1 and S2 and no murmur.  Abdomen: soft, non-tender  Musculoskeletal: the gait could not be assessed.   Extremities: no clubbing of the fingernails, no localized cyanosis, no petechial hemorrhages and no ischemic changes.   Skin: normal skin color and pigmentation, no rash, no venous stasis, no skin lesions, no skin ulcer and no xanthoma was observed.       LABORATORY:                          7.4    10.41 )-----------( 116      ( 21 Feb 2022 02:30 )             21.2     02-21    142  |  113<H>  |  35<H>  ----------------------------<  148<H>  4.0   |  24  |  1.11    Ca    8.1<L>      21 Feb 2022 02:10  Phos  2.5     02-21  Mg     2.6     02-21    TPro  5.2<L>  /  Alb  1.8<L>  /  TBili  0.8  /  DBili  x   /  AST  28  /  ALT  26  /  AlkPhos  138<H>  02-21        LIVER FUNCTIONS - ( 21 Feb 2022 02:10 )  Alb: 1.8 g/dL / Pro: 5.2 gm/dL / ALK PHOS: 138 U/L / ALT: 26 U/L / AST: 28 U/L / GGT: x               IMAGING:  < from: 12 Lead ECG (02.17.22 @ 01:41) >   Sinus rhythm  premature atrial complexes  Abnormal ECG  When compared with ECG of 07-MAY-2020 14:04,  Nonspecific T wave abnormality now evident in Anterolateral leads    < end of copied text >    < from: US Duplex Venous Upper Ext Complete, Bilateral (02.19.22 @ 19:28) >  No evidence of deep venous thrombosis in either upper extremity.    < end of copied text >    < from: NM Hepatobiliary Imaging (02.18.22 @ 18:00) >  No scan evidence of acute cholecystitis.    < end of copied text >    ASSESSMENT:  89-year-old with hypertension, dementia, admitted with failure to thrive, weakness, lethargy and Covid positive status with worsening lactate level and chest film showing patchy right lung opacity.  Placed on Levophed and given fluids for hypotension and admitted for COVID-19 pneumonia & septic shock treatment.      PLAN:     Continue with IV fluids, dopamine, norepinephrine, dexamethasone and broad-spectrum antibiotics.  She remains in critical condition.  Bradycardia arrhythmia unlikely on the basis of advanced age, comorbidities and COVID-19 pneumonia.  Echo ordered.  Condition remains guarded.      Mik Nation MD, FACC, FASE, FASNC, FACP  Director, Heart Failure Services  [facility]  , Department of Cardiology  [employer] [facility] [employer]

## 2022-02-21 NOTE — CONSULT NOTE ADULT - SUBJECTIVE AND OBJECTIVE BOX
Patient is a 89y old  Female who presents with a chief complaint of hypotension, COVID pneumonia (21 Feb 2022 20:35)      Reason For Consult: low TSH     HPI:  89 YO female with PMH asthma (not on meds), HTN (not on meds), dementia (baseline is that pt can walk and talk but needs assistance with all ADLs), presents for lethargy, weakness, FTT over the last 2-3 days. Pt received first dose Pfizer 2 weeks ago.  In the ED, pt found to be COVID positive with labs significant for lactate 6.8, creatinine 1.5, sodium 147.  CT head negative for acute pathology.  CXR with patchy opacity of right lung.  Pt was given 1.5L of IVF and remained hypotensive peripheral levophed started.    Admit to ICU for septic shock 2/2 COVID pneumonia. (17 Feb 2022 04:24)  Endocrine History : Patient has no known endocrine history especially thyroid.  Comes in with Covid 19  pneumonia and is not septic shock.  TSH is at 0.166, T4 total is 4.5 and T3 total is 41.  All these points towards sick thyroid syndrome    PAST MEDICAL & SURGICAL HISTORY:  HTN (hypertension)    Asthma    Dementia    No significant past surgical history        FAMILY HISTORY:  Family history of cancer (Sibling)  Cousin          Social History:    MEDICATIONS  (STANDING):  chlorhexidine 2% Cloths 1 Application(s) Topical <User Schedule>  dexAMETHasone  Injectable 6 milliGRAM(s) IV Push daily  dextrose 5% + lactated ringers. 1000 milliLiter(s) (50 mL/Hr) IV Continuous <Continuous>  DOPamine Infusion 5 MICROgram(s)/kG/Min (8.81 mL/Hr) IV Continuous <Continuous>  heparin   Injectable 5000 Unit(s) SubCutaneous every 12 hours  piperacillin/tazobactam IVPB.. 3.375 Gram(s) IV Intermittent <User Schedule>    MEDICATIONS  (PRN):      REVIEW OF SYSTEMS:  CONSTITUTIONAL:  as per HPI      T(C): 36 (02-21-22 @ 19:00), Max: 37.3 (02-21-22 @ 08:00)  HR: 74 (02-21-22 @ 22:30) (39 - 93)  BP: 128/56 (02-21-22 @ 22:30) (87/36 - 176/57)  RR: 12 (02-21-22 @ 22:30) (12 - 27)  SpO2: 95% (02-21-22 @ 22:30) (91% - 100%)  Wt(kg): --    PHYSICAL EXAM: obtunded, as per the progress notes of Primary Team   GENERAL: NAD, well-groomed, well-developed      CAPILLARY BLOOD GLUCOSE                                7.4    10.41 )-----------( 116      ( 21 Feb 2022 02:30 )             21.2       CMP:  02-21 @ 02:10  SGPT 26  Albumin 1.8   Alk Phos 138   Anion Gap 5   SGOT 28   Total Bili 0.8   BUN 35   Calcium Total 8.1   CO2 24   Chloride 113   Creatinine 1.11   eGFR if AA 50   eGFR if non AA 43   Glucose 148   Potassium 4.0   Protein 5.2   Sodium 142      Thyroid Function Tests:  02-20 @ 10:42 TSH -- FreeT4 -- T3 41 Anti TPO -- Anti Thyroglobulin Ab -- TSI --  02-20 @ 02:48 TSH 0.166 FreeT4 -- T3 -- Anti TPO -- Anti Thyroglobulin Ab -- TSI --      Diabetes Tests:     Parathyroids:     Adrenals:       Radiology:

## 2022-02-22 NOTE — PROGRESS NOTE ADULT - SUBJECTIVE AND OBJECTIVE BOX
HPI: 89 YO female with PMH asthma (not on meds), HTN (not on meds), dementia (baseline is that pt can walk and talk but needs assistance with all ADLs), presents for lethargy, weakness, FTT over the last 2-3 days. Pt received first dose Pfizer 2 weeks ago. In the ED, pt found to be COVID positive with labs significant for lactate 6.8, creatinine 1.5, sodium 147. CT head negative for acute pathology. CXR with patchy opacity of right lung. Pt was given 1.5L of IVF and remained hypotensive peripheral levophed started.  Admitted to ICU for septic shock 2/2 COVID pneumonia.    ON Events: pt became benjamín to the 20's ~3am, given 0.5 L bolus. Pt also became agitated at 9pm last night, grabbing at nurses, given 2.5 mg haldol.     Physical Exam:    T(C): 35.8 (02-22-22 @ 08:38), Max: 37.3 (02-21-22 @ 12:00)  HR: 89 (02-22-22 @ 09:30) (39 - 92)  BP: 129/37 (02-22-22 @ 09:30) (80/56 - 176/57)  RR: 16 (02-22-22 @ 09:30) (12 - 19)  SpO2: 96% (02-22-22 @ 09:30) (56% - 100%)    CONSTITUTIONAL: lethargic, A&O x 0  ENMT: Oral mucosa with moist membranes.  RESPIRATORY: No respiratory distress, no use of accessory muscles; CTA b/l, no wheezes, rales or rhonchi  LYMPHATIC: Right arm swelling  CARDIOVASCULAR: RRRR, +S1S2, no murmurs, no rubs, no gallops  GASTROINTESTINAL: Soft, mildly tender diffusely    Labs:                        7.3    10.72 )-----------( 129      ( 22 Feb 2022 02:52 )             21.5   02-22    142  |  110<H>  |  33<H>  ----------------------------<  314<H>  4.5   |  25  |  1.09    Ca    8.2<L>      22 Feb 2022 02:52  Phos  2.9     02-22  Mg     2.6     02-22    TPro  5.3<L>  /  Alb  1.7<L>  /  TBili  0.7  /  DBili  x   /  AST  26  /  ALT  26  /  AlkPhos  128<H>  02-22    Radiology:    ACC: [id number] EXAM:  US ABDOMEN LIMITED                        PROCEDURE DATE:  02/18/2022    INTERPRETATION:  CLINICAL INFORMATION: Elevated liver enzymes  COMPARISON: None available.  TECHNIQUE: Sonography of the right upper quadrant.  FINDINGS:    Liver: Within normal limits.  Bile ducts: Mild dilatation. Common bile duct measures 9 mm.  Gallbladder: Distended with cholelithiasis and sludge. Wall thickening.   Questionable focal tenderness noted by technologist.  Pancreas: Dilated duct measuring 4 mm.  Right kidney: 8.3 cm. No hydronephrosis.  Ascites: None.  IVC: Visualized portions are within normal limits.  Other: Right pleural effusion.    IMPRESSION:    Distended gallbladder with gallstones and sludge. Wall thickening present.  Questionable focal tenderness. Recommend nuclear medicine hepatobiliary   scan for further assessment.  Dilated biliary and pancreatic ducts.    --- End of Report ---       HPI: 89 YO female with PMH asthma (not on meds), HTN (not on meds), dementia (baseline is that pt can walk and talk but needs assistance with all ADLs), presents for lethargy, weakness, FTT over the last 2-3 days. Pt received first dose Pfizer 2 weeks ago. In the ED, pt found to be COVID positive with labs significant for lactate 6.8, creatinine 1.5, sodium 147. CT head negative for acute pathology. CXR with patchy opacity of right lung. Pt was given 1.5L of IVF and remained hypotensive peripheral levophed started.  Admitted to ICU for septic shock 2/2 COVID pneumonia.    ON Events: pt became benjamín to the 20's. Attempted to pull out lines, put on restraints. Patient also has a productive-sounding cough.    Physical Exam:    T(C): 35.8 (02-22-22 @ 08:38), Max: 37.3 (02-21-22 @ 12:00)  HR: 89 (02-22-22 @ 09:30) (39 - 92)  BP: 129/37 (02-22-22 @ 09:30) (80/56 - 176/57)  RR: 16 (02-22-22 @ 09:30) (12 - 19)  SpO2: 96% (02-22-22 @ 09:30) (56% - 100%)    CONSTITUTIONAL: lethargic, A&O x 0  ENMT: Oral mucosa with moist membranes.  RESPIRATORY: No respiratory distress, no use of accessory muscles; CTA b/l, no wheezes, rales or rhonchi  LYMPHATIC: Right arm and hand swelling  CARDIOVASCULAR: RRRR, +S1S2, no murmurs, no rubs, no gallops  GASTROINTESTINAL: Soft, nontender    Labs:                        7.3    10.72 )-----------( 129      ( 22 Feb 2022 02:52 )             21.5   02-22    142  |  110<H>  |  33<H>  ----------------------------<  314<H>  4.5   |  25  |  1.09    Ca    8.2<L>      22 Feb 2022 02:52  Phos  2.9     02-22  Mg     2.6     02-22    TPro  5.3<L>  /  Alb  1.7<L>  /  TBili  0.7  /  DBili  x   /  AST  26  /  ALT  26  /  AlkPhos  128<H>  02-22    Radiology:    ACC: [id number] EXAM:  US ABDOMEN LIMITED                        PROCEDURE DATE:  02/18/2022    INTERPRETATION:  CLINICAL INFORMATION: Elevated liver enzymes  COMPARISON: None available.  TECHNIQUE: Sonography of the right upper quadrant.  FINDINGS:    Liver: Within normal limits.  Bile ducts: Mild dilatation. Common bile duct measures 9 mm.  Gallbladder: Distended with cholelithiasis and sludge. Wall thickening.   Questionable focal tenderness noted by technologist.  Pancreas: Dilated duct measuring 4 mm.  Right kidney: 8.3 cm. No hydronephrosis.  Ascites: None.  IVC: Visualized portions are within normal limits.  Other: Right pleural effusion.    IMPRESSION:    Distended gallbladder with gallstones and sludge. Wall thickening present.  Questionable focal tenderness. Recommend nuclear medicine hepatobiliary   scan for further assessment.  Dilated biliary and pancreatic ducts.    --- End of Report ---    ACC: [id number] EXAM:  NM HEPATOBILIARY IMG                        PROCEDURE DATE:  02/18/2022    INTERPRETATION:  RADIOPHARMACEUTICAL:  99mTc-Mebrofenin  DOSE: 3 mCi IV  CLINICAL INFORMATION: 89 year old female with elevated liver enzymes,  cholelithiasis,, referred to evaluate for acute cholecystitis  TECHNIQUE: Dynamic images of the anterior abdomen were obtained for 1   hour immediately following radiotracer injection. Static images of the   abdomen in the anterior projection wasalso obtained. This study is   limited and technically suboptimal due to patient motion.    COMPARISON: No previous hepatobiliary scan for comparison    FINDINGS: The study is limited due to patient motion. There is prompt   uptake of the injected radiotracer by the hepatocytes. The gallbladder is   first visualized at 50 minutes post tracer injection and bowel activity   by 25 minutes. There is normal tracer clearance from the liver by the end   of the study.    IMPRESSION: Limited hepatobiliaryscan.    No scan evidence of acute cholecystitis.    --- End of Report ---

## 2022-02-22 NOTE — PROGRESS NOTE ADULT - ASSESSMENT
89 YO female with PMH asthma (not on meds), HTN (not on meds), dementia (baseline is that pt can walk and talk but needs assistance with all ADLs), presents for lethargy, weakness, FTT over the last 2-3 days. Pt received first dose Pfizer 2 weeks ago.  In the ED, pt found to be COVID positive with labs significant for lactate 6.8, creatinine 1.5, sodium 147.  CT head negative for acute pathology.  CXR with patchy opacity of right lung.  Pt was given 1.5L of IVF and remained hypotensive peripheral levophed started.  Admit to ICU for septic shock 2/2 COVID pneumonia  asked to evaluate for possible tocilizumab. She came in and documentation shows 100% on room air then she was placed on NRB but since then she had been weaned to venti mask.  Her platelets are low and close to the threshold of 50k   she had a very high lactate and there may be a develop bacterial process which would exclude her from receiving tocilizumab     2/18: patient has been weaned off oxygen successfully , has elevated alk phos and tender abdomen so US was done and shows possible acute cholecystitis started on zosyn, deemed not a surgical candidate, if necessary can get perc cholecystotomy tube, HIDA scan pending    2/20: HIDA negative, pain is improved, more awake, LFTs normal, will continue zosyn for total of 5-7 days    2/22: at this juncture, given the negative HIDA annd benign abdominal exam would suggest stopping antibiotics. She is off oxygen from a covid standpoint and would suggest stopping decadron as there is little evidence to support its use in mild covid infections in patients who are not requiring oxygen. CCU having ongoing discussions regarding GOC.     COVID  ARF   functional quadriplegia   high serum lactate    Plan:    #COVID  Monitor clinically.  Monitor Oxygenation  O2 supplementation if needed   Remdesivir x5 days  completed   Monitor CBC, CMP daily  Ferritin, CRP, and D dimer q 48 hrs.  stop IV Dexamethasone  Anticoagulation per protocol.  Treatment options are limited  Monitor for any bacterial superinfection/complications.  This tx plan was formulated utilizing my clinical judgement, currently available local/regional anecdotal information, organizational treatment recommendations with COVID-19 specific considerations given rapidly changing information available.   not a candidate for tocilizumab at this kal     #Acute Respiratory Failure- resolved   chest PT  routine PT  OOB to chair if able   Inhalers-MDI and avoid nebulizers except in negative pressure room     #high serum lactate-resolved    send lactate if worsens   pressors per icu   complete antibiotics course     #acute cholecystitis-ruled out   surgery recommendations appreciated   zosyn for 5 days   trend LFTs   perc choley only if necessary     D/W CCU team    Alin Resendez,   Infectious Disease Attending  Reachable via Microsoft Teams or ID office: [phone]  After 5pm/weekends please call [phone] for all inquiries and new consults

## 2022-02-22 NOTE — PROGRESS NOTE ADULT - ATTENDING COMMENTS
24 hr events:  weaned off levophed as BP was high but HR remained in the 30-50s on levo  placed on dopamine yesterday: still bradycardic HR 40-50s, unable to wean down dopamine as HR drops to the upper 20s/low 30s  pt lethargic, intermittently at times arousable      89F PMH asthma (not on meds), HTN (not on meds), dementia (baseline can walk and talk but needs assistance with all ADLs), presents for lethargy and weakness with decreased PO intake over 2-3 days. s/p 1st dose of Pfizer COVID vaccination 2 weeks prior. Hypotensive in ED, lactate 6.8, Cr 1.5, sodium 147. CXR with bibasilar infiltrates. Tested + for COVID. Admitted to ICU for severe sepsis/septic shock, lactic acidosis, DARREN with ATN, COVID PNA, hypernatremia, acute metabolic encephalopathy. Now with gram + cocci in clusters in blood cx (gram + bacteremia). Course also with bradycardia HR 30-40s. Found to have low TSH and low T3 T4    NEURO  lethargic but arousable at times  will cont to monitor  baseline dementia: son reports pt previously able to still speak answering yes/no questions    CV  off levophed  off midodrine due to bradycardia  now on dopamine but still with bradycardia HR 40-50s  attempted to wean down dopamine today and HR dropped to the 20s-30s with hypotension MAP 40s  ? bradyarrhythmia due to conduction disease with advanced age vs conduction issues with underlying COVID infection  ?remdesivir causing bradycardia (completed her course, so will see if HR improved off medication)  cariology eval: ? need for pacemaker - however likely will need acute active infectious process controlled first ?if pt a candidate for device placement with poor functional status right now    PULM  COVID: s/p 5 day course of remdesivir  cont on 10 day course of dexamethasone  nasal cannula O2 supplementation as needed  maintain O2 sat > 90%    RENAL  renal function improved  hypernatremia resolved: d/c D5 LR @ 50cc/hr    ID  blood cx with coag negative staph, likely a contaminant  initial concerns for biliary source of infection with negative HIDA work up- acute cholecystitis ruled out  empirically on zosyn, will complete a 5 day course     ENDO  pt with labs showing low TSH but also with low T3 T4  will repeat levels  ?if pt with underlying sick euthyroid  endo eval appreciated    GEN  DVT prophylaxis: heparin sc  full code for now  d/w son Shiv today as well regarding overall poor nutritional intake. comfort measures vs peg and continued aggressive care reviewed with son. he needs some time to think and discuss with rest of family.  will have palliative care eval see patient

## 2022-02-22 NOTE — PROGRESS NOTE ADULT - SUBJECTIVE AND OBJECTIVE BOX
SHAMAR GUILLERMO  MRN-[id number]      Follow Up:  covid     Interval History: patient seen and examined. More lethargic but arousable, didnt say anything or respond to questions, just stared     ROS:    [X ] Unobtainable because: poor mental status   [ ] All other systems negative except as noted          Allergies  No Known Allergies        ANTIMICROBIALS:    piperacillin/tazobactam IVPB.. 3.375 <User Schedule>      MEDICATIONS  (STANDING):  dexAMETHasone  Injectable 6 daily  DOPamine Infusion 5 <Continuous>  heparin   Injectable 5000 every 12 hours      PRN      Physical Exam:  Vital Signs Last 24 Hrs  T(F): 100 (22 @ 00:53), Max: 100 (22 @ 00:53)  HR: 74 (22 @ 22:00)  BP: 132/64 (22 @ 22:00)  RR: 14 (22 @ 22:00)  SpO2: 96% (22 @ 22:00) (56% - 100%)  Wt(kg): --    General:    NAD,  non toxic, on room air   Head: atraumatic, normocephalic  Eye: normal sclera and conjunctiva  ENT:    no oral lesions, neck supple  Cardio:     bradycardic, S1, S2,  no murmur  Respiratory:    clear b/l,    no wheezing  abd:     soft,   BS +,   no tenderness today   : no SPT  Musculoskeletal:   no joint swelling,   no edema  vascular: no central lines, +PIV   Skin:    no rash  Neurologic:    poor mental status but arousable   psych: difficult to assess     WBC Count: 10.72 K/uL ( @ 02:52)  WBC Count: 10.41 K/uL ( @ 02:30)  WBC Count: 10.26 K/uL ( @ 02:48)  WBC Count: 11.63 K/uL ( @ 04:33)  WBC Count: 13.22 K/uL ( @ 04:16)  WBC Count: 9.46 K/uL ( @ 10:28)  WBC Count: 5.22 K/uL ( @ 00:24)                            7.3    10.72 )-----------( 129      ( 2022 02:52 )             21.5           142  |  110<H>  |  33<H>  ----------------------------<  314<H>  4.5   |  25  |  1.09    Ca    8.2<L>      2022 02:52  Phos  2.9       Mg     2.6         TPro  5.3<L>  /  Alb  1.7<L>  /  TBili  0.7  /  DBili  x   /  AST  26  /  ALT  26  /  AlkPhos  128<H>        Creatinine Trend: 1.09<--, 1.11<--, 1.13<--, 1.30<--, 1.22<--, 1.15<--            Urinalysis Basic - ( 2022 01:52 )    Color: Yellow / Appearance: Clear / S.010 / pH: x  Gluc: x / Ketone: Trace  / Bili: Negative / Urobili: 1 mg/dL   Blood: x / Protein: 100 mg/dL / Nitrite: Negative   Leuk Esterase: Negative / RBC: 3-5 /HPF / WBC 0-2   Sq Epi: x / Non Sq Epi: Few / Bacteria: Many        Creatinine Trend: 1.22<--, 1.15<--, 1.52<--  Lactate, Blood: 1.3 mmol/L (22 @ 10:28)  Lactate, Blood: 4.5 mmol/L (22 @ 01:52)  Lactate, Blood: 6.8 mmol/L (22 @ 00:24)      MICROBIOLOGY:  v  Clean Catch Clean Catch (Midstream)  22   No growth  --  --      .Blood Blood-Peripheral  22   No growth to date.  --  --      Rapid RVP Result: Detected ( @ 00:24)        C-Reactive Protein, Serum: 293 ()    Ferritin, Serum: 914 ()      D-Dimer Assay, Quantitative:  ()    Procalcitonin, Serum: 0.62 (22 @ 16:45)    SARS-CoV-2: Detected (22 @ 00:24)  Rapid RVP Result: Detected (22 @ 00:24)        RADIOLOGY:  < from: US Abdomen Limited (22 @ 10:43) >  IMPRESSION:    Distended gallbladder with gallstones and sludge. Wall thickening present.  Questionable focal tenderness. Recommend nuclear medicine hepatobiliary   scan for further assessment.  Dilated biliary and pancreatic ducts.

## 2022-02-22 NOTE — PROGRESS NOTE ADULT - ASSESSMENT
89 YO female with PMH asthma, HTN, dementia p/w 2-3 days of lethargy and weakness found to be hypotensive, hypothermic, and COVID+ with RLL infiltrate admitted to the ICU for septic shock requiring pressors. Now with WBC of 13.22, ALP of 429, AST 95, elevated inflammatory markers, intermittent MAPs in 30's/40's and distended gall bladder w/ wall thickening c/f possible biliary sepsis.    Neuro:   q1 hr neuro checks    Resp:   % sat on RA    ID:   Cont Remdesivir + Decadron for COVID PNA  Zosyn for possible biliary sepsis    CVS:   Levophed to maintain SBP >100 and MAP >65    GI:  distended, thickened gallbladder with sludge and CBD 9mm (normal for age)  Zosyn for biliary coverage  f/u HIDA scan    Renal/Electrolytes:   Na 150, c/w D5 glucose  BUN/Cr improving with adequate UO; continue to monitor    FEN:   failed speech and swallow  NPO for now    HEME:   DVT prophylaxis with sq Heparin  Thrombocytopenia; trend platelets    Full code, GOC discussion with family   89 YO female with PMH asthma, HTN, dementia p/w 2-3 days of lethargy and weakness found to be hypotensive, hypothermic, and COVID+ with RLL infiltrate admitted to the ICU for septic shock requiring pressors. Initial concern of biliary sepsis decreased due to negative HIDA scan and decreasing ALP. Persistent intermittent bradycardia to the 30's.    Neuro:   q1 hr neuro checks    Pulm:  Pt satting well on RA    ID:   s/p 5d remdesivir  on day 6 of dexamethasone  Zosyn for empiric coverage of abdominal pathogens     CVS:   Off levophed  Dopamine for persistent bradycardia  f/u echo    GI:  decreased concern for acute courtney d/t negative HIDA scan    Renal/Electrolytes:   Na 142 - d/c D5 glucose  BUN/Cr improving with adequate UO and BUN/Cr; continue to monitor    ENDO:  decrease thyroid TSH, T3, T4  per endo- likely sick thyroid syndrome but repeat thyroid hormone levels d/t significantly low levels and c/f lab processing issue    HEME:   DVT prophylaxis with sq Heparin  Thrombocytopenia; trend platelets    Full code, GOC discussion with family   89 YO female with PMH asthma, HTN, dementia p/w 2-3 days of lethargy and weakness found to be hypotensive, hypothermic, and COVID+ with RLL infiltrate admitted to the ICU for septic shock requiring pressors. Initial concern of biliary sepsis decreased due to negative HIDA scan and decreasing ALP. Persistent intermittent bradycardia to the 30's.    Neuro:   q1 hr neuro checks    Pulm:  Pt satting well on RA    ID:   s/p 5d remdesivir  on day 6 of dexamethasone  Zosyn for empiric biliary coverage    CVS:   Off levophed  Dopamine for persistent bradycardia  f/u echo    GI:  decreased concern for acute courtney d/t negative HIDA scan    Renal/Electrolytes:   Na 142 - d/c D5 glucose  BUN/Cr improving with adequate UO and BUN/Cr; continue to monitor    ENDO:  decrease thyroid TSH, T3, T4  per endo- likely sick thyroid syndrome but repeat thyroid hormone levels d/t significantly low levels and c/f lab processing issue    HEME:   DVT prophylaxis with sq Heparin  Thrombocytopenia; trend platelets    Full code, GOC discussion with family   89 YO female with PMH asthma, HTN, dementia p/w 2-3 days of lethargy and weakness found to be hypotensive, hypothermic, and COVID+ with RLL infiltrate admitted to the ICU for septic shock requiring pressors. Initial concern of biliary sepsis decreased due to negative HIDA scan and decreasing ALP. Persistent intermittent bradycardia to the 30's.    Neuro:   q1 hr neuro checks    Pulm:  Pt satting well on RA    ID:   s/p 5d remdesivir  on day 6 of dexamethasone  Zosyn for empiric biliary coverage    CVS:   Off levophed  Dopamine for persistent bradycardia      GI:  decreased concern for acute courtney d/t negative HIDA scan    Renal/Electrolytes:   Na 142 - d/c D5 glucose  BUN/Cr improving with adequate UO and BUN/Cr; continue to monitor    ENDO:  decrease thyroid TSH, T3, T4  per endo- likely sick thyroid syndrome but repeat thyroid hormone levels d/t significantly low levels and c/f lab processing issue    HEME:   DVT prophylaxis with sq Heparin  Thrombocytopenia; trend platelets    Full code, GOC discussion with family   89 YO female with PMH asthma, HTN, dementia p/w 2-3 days of lethargy and weakness found to be hypotensive, hypothermic, and COVID+ with RLL infiltrate admitted to the ICU for septic shock requiring pressors. Initial concern of biliary sepsis decreased due to negative HIDA scan and decreasing ALP. Persistent intermittent bradycardia to the 30's.    Neuro:   q1 hr neuro checks    Pulm:  Pt satting well on RA    ID:   s/p 5d remdesivir  on day 6 of dexamethasone  Zosyn for empiric biliary coverage    CVS:   Off levophed  Dopamine for persistent bradycardia  echo showing EF of 65-70%, trace MV regurg, mod TV regurg, mild pHTN, and mild mitral stenosis    GI:  decreased concern for acute courtney d/t negative HIDA scan    Renal/Electrolytes:   Na 142 - d/c D5 glucose  BUN/Cr improving with adequate UO and BUN/Cr; continue to monitor    ENDO:  decrease thyroid TSH, T3, T4  per endo- likely sick thyroid syndrome but repeat thyroid hormone levels d/t significantly low levels and c/f lab processing issue    HEME:   DVT prophylaxis with sq Heparin  Thrombocytopenia; trend platelets    Full code, GOC discussion with family

## 2022-02-22 NOTE — PROGRESS NOTE ADULT - SUBJECTIVE AND OBJECTIVE BOX
Patient is a 89y old  Female who presents with a chief complaint of hypotension, COVID pneumonia (22 Feb 2022 23:03)      Interval History: thyroid function tests consistent with sick thyroid syndrome     MEDICATIONS  (STANDING):  chlorhexidine 2% Cloths 1 Application(s) Topical <User Schedule>  dexAMETHasone  Injectable 6 milliGRAM(s) IV Push daily  DOPamine Infusion 5 MICROgram(s)/kG/Min (8.81 mL/Hr) IV Continuous <Continuous>  heparin   Injectable 5000 Unit(s) SubCutaneous every 12 hours  piperacillin/tazobactam IVPB.. 3.375 Gram(s) IV Intermittent <User Schedule>    MEDICATIONS  (PRN):      Allergies    No Known Allergies    Intolerances        REVIEW OF SYSTEMS:      Vital Signs Last 24 Hrs  T(C): 38.1 (23 Feb 2022 04:35), Max: 38.1 (23 Feb 2022 04:35)  T(F): 100.6 (23 Feb 2022 04:35), Max: 100.6 (23 Feb 2022 04:35)  HR: 92 (23 Feb 2022 07:00) (43 - 101)  BP: 81/38 (23 Feb 2022 07:00) (81/38 - 169/45)  BP(mean): 48 (23 Feb 2022 07:00) (43 - 89)  RR: 15 (23 Feb 2022 07:00) (9 - 22)  SpO2: 92% (23 Feb 2022 07:00) (92% - 100%)    PHYSICAL EXAM:  GENERAL: as per the progress notes of Primary Team , obtunded    LABS:        CAPILLARY BLOOD GLUCOSE        Lipid panel:           Thyroid:02-23 @ 04:10 TSH 0.034 <<Free T4>> -- <<T3>> -- <<TG Ab>> -- <<ATPOAB>> -- <<TBG>> -- <<TSI>> -- Reverse T3 --    Diabetes Tests:  Parathyroid Panel:  Adrenals:  RADIOLOGY & ADDITIONAL TESTS:    Imaging Personally Reviewed:  [ ] YES  [ ] NO    Consultant(s) Notes Reviewed:  [ ] YES  [ ] NO    Care Discussed with Consultants/Other Providers [ ] YES  [ ] NO

## 2022-02-22 NOTE — PROCEDURE NOTE - NSPROCDETAILS_GEN_ALL_CORE
under ultrasound guidance/location identified, draped/prepped, sterile technique used/blood seen on insertion/dressing applied/flushes easily/secured in place/sterile technique, catheter placed

## 2022-02-23 NOTE — PROGRESS NOTE ADULT - SUBJECTIVE AND OBJECTIVE BOX
Patient is a 89y old  Female who presents with a chief complaint of hypotension, COVID pneumonia (23 Feb 2022 14:27)      Interval History:  thyroid function tests consistent with sick thyroid syndrome     MEDICATIONS  (STANDING):  chlorhexidine 2% Cloths 1 Application(s) Topical <User Schedule>  dexAMETHasone  Injectable 6 milliGRAM(s) IV Push daily  DOPamine Infusion 5 MICROgram(s)/kG/Min (8.81 mL/Hr) IV Continuous <Continuous>  heparin   Injectable 5000 Unit(s) SubCutaneous every 12 hours    MEDICATIONS  (PRN):      Allergies    No Known Allergies    Intolerances        REVIEW OF SYSTEMS:  CONSTITUTIONAL: no changes      Vital Signs Last 24 Hrs  T(C): 36.4 (23 Feb 2022 17:57), Max: 38.1 (23 Feb 2022 04:35)  T(F): 97.6 (23 Feb 2022 17:57), Max: 100.6 (23 Feb 2022 04:35)  HR: 69 (23 Feb 2022 21:30) (57 - 114)  BP: 152/57 (23 Feb 2022 21:30) (62/19 - 176/69)  BP(mean): 81 (23 Feb 2022 21:30) (26 - 94)  RR: 18 (23 Feb 2022 21:30) (10 - 30)  SpO2: 96% (23 Feb 2022 21:30) (92% - 99%)    PHYSICAL EXAM:  GENERAL: poor mentation     LABS:        CAPILLARY BLOOD GLUCOSE        Lipid panel:           Thyroid:02-23 @ 09:51 TSH -- <<Free T4>> -- <<T3>> 46 <<TG Ab>> -- <<ATPOAB>> -- <<TBG>> -- <<TSI>> -- Reverse T3 --  02-23 @ 04:10 TSH 0.034 <<Free T4>> -- <<T3>> -- <<TG Ab>> -- <<ATPOAB>> -- <<TBG>> -- <<TSI>> -- Reverse T3 --    Diabetes Tests:  Parathyroid Panel:  Adrenals:  RADIOLOGY & ADDITIONAL TESTS:    Imaging Personally Reviewed:  [ ] YES  [ ] NO    Consultant(s) Notes Reviewed:  [ ] YES  [ ] NO    Care Discussed with Consultants/Other Providers [ ] YES  [ ] NO

## 2022-02-23 NOTE — PROGRESS NOTE ADULT - ASSESSMENT
89 YO female with PMH asthma, HTN, dementia p/w 2-3 days of lethargy and weakness found to be hypotensive, hypothermic, and COVID+ with RLL infiltrate admitted to the ICU for septic shock requiring pressors. Initial concern of biliary sepsis decreased due to negative HIDA scan and decreasing ALP. Persistent intermittent bradycardia to the 30's.    Neuro:   q1 hr neuro checks    Pulm:  Pt satting well on RA    ID:   s/p 5d remdesivir  on day 7 of dexamethasone  Zosyn for empiric biliary coverage    CVS:   Off levophed  Dopamine for persistent bradycardia  echo showing EF of 65-70%, trace MV regurg, mod TV regurg, mild pHTN, and mild mitral stenosis    GI:  decreased concern for acute courtney d/t negative HIDA scan    Renal/Electrolytes:   hypernatremia resolved   renal function resolved - BUN/Cr improving with adequate UO, continue to monitor    ENDO:  repeat thyroid hormone levels: TSH- 0.034, T3- 46, T4- 3.5  TSH even lower than prior, likely d/t critical state, sick thyroid syndrome    HEME:   DVT prophylaxis with sq Heparin  Thrombocytopenia; trend platelets    GOC:

## 2022-02-23 NOTE — PROGRESS NOTE ADULT - SUBJECTIVE AND OBJECTIVE BOX
HPI: 89 YO female with PMH asthma (not on meds), HTN (not on meds), dementia (baseline is that pt can walk and talk but needs assistance with all ADLs), presents for lethargy, weakness, FTT over the last 2-3 days. Pt received first dose Pfizer 2 weeks ago. In the ED, pt found to be COVID positive with labs significant for lactate 6.8, creatinine 1.5, sodium 147. CT head negative for acute pathology. CXR with patchy opacity of right lung. Pt was given 1.5L of IVF and remained hypotensive peripheral levophed started.  Admitted to ICU for septic shock 2/2 COVID pneumonia.    ON Events: pt hb drop to 6.4 and tachy to 140's - dopamine was decreased from 12-10 and PRBC's were administered. Pt also became hypotensive to 50's/30's - she returned to baseline after 3-4 minutes. Son was called to come visit her promptly.    Physical Exam:    ICU Vital Signs Last 24 Hrs  T(C): 37.1 (23 Feb 2022 08:00), Max: 38.1 (23 Feb 2022 04:35)  T(F): 98.8 (23 Feb 2022 08:00), Max: 100.6 (23 Feb 2022 04:35)  HR: 86 (23 Feb 2022 13:30) (43 - 114)  BP: 62/19 (23 Feb 2022 13:30) (62/19 - 169/45)  BP(mean): 26 (23 Feb 2022 13:30) (26 - 89)  ABP: --  ABP(mean): --  RR: 21 (23 Feb 2022 13:30) (9 - 24)  SpO2: 96% (23 Feb 2022 13:00) (92% - 100%)    CONSTITUTIONAL: lethargic, A&O x 0  ENMT: Oral mucosa with moist membranes.  RESPIRATORY: No respiratory distress, no use of accessory muscles; CTA b/l, no wheezes, rales or rhonchi  LYMPHATIC: Bilateral arm swelling  CARDIOVASCULAR: RRRR, +S1S2, no murmurs, no rubs, no gallops  GASTROINTESTINAL: Soft, nontender    Labs:                                      6.4    10.17 )-----------( 111      ( 23 Feb 2022 04:10 )             18.4     145  |  114<H>  |  32<H>  ----------------------------<  99  3.8   |  25  |  1.09    Ca    8.0<L>      23 Feb 2022 04:10  Phos  2.8     02-23  Mg     2.8     02-23    TPro  5.3<L>  /  Alb  1.7<L>  /  TBili  0.7  /  DBili  x   /  AST  26  /  ALT  26  /  AlkPhos  128<H>  02-22

## 2022-02-23 NOTE — CHART NOTE - NSCHARTNOTEFT_GEN_A_CORE
Assessment:  Pt seen in CCU, on COVID-19 isolation, lethargy noted.  Pt adm c induced hypothermia, AMS, sepsis, pneumonia, COVID, severe sepsis, lactic acidosis, DARREN c ATN, COVID pneumonia, hypernatremia, acute metabolic encephalopathy, low TSH and low T# & T4, Endocrine consult noted; c sick-euthyroid syndrome, initial concern of biliary sepsis decreased due to negative HIDA scan and decreasing ALP.  Pt is DNR.  PMH include dementia, asthma, HTN.    Factors impacting intake: [ ] none [ ] nausea  [ ] vomiting [ ] diarrhea [ ] constipation  [ ]chewing problems [x ] swallowing issues  [x ] other: AMS, acute illness     Diet Prescription: Diet, Pureed:   No Liquids (NOLIQUIDS) (02-20-22 @ 14:36)    Intake: 0%    Current Weight: 02/23, 52.7 kg, 02/17, 47.1 kg, c wt. gain of 5.6 kg  % Weight Change: 11.8%  02/22, 3+ edema of arms noted   I/O: 931/250(+681)      Pertinent Medications: MEDICATIONS  (STANDING):  chlorhexidine 2% Cloths 1 Application(s) Topical <User Schedule>  dexAMETHasone  Injectable 6 milliGRAM(s) IV Push daily  DOPamine Infusion 5 MICROgram(s)/kG/Min (8.81 mL/Hr) IV Continuous <Continuous>  heparin   Injectable 5000 Unit(s) SubCutaneous every 12 hours    MEDICATIONS  (PRN):    Pertinent Labs: 02-23 Na145 mmol/L Glu 99 mg/dL K+ 3.8 mmol/L Cr  1.09 mg/dL BUN 32 mg/dL<H> 02-23 Phos 2.8 mg/dL 02-22 Alb 1.7 g/dL<L>02-22 ALT 26 U/L AST 26 U/L Alkaline Phosphatase 128 U/L<H>   CAPILLARY BLOOD GLUCOSE        Skin:   Skin tear; right arm noted     Estimated Needs:   [ x] no change since previous assessment(02/19)  [ ] recalculated:     Previous Nutrition Diagnosis: (02/19)  Malnutrition Severe malnutrition in the context of acute illness.     Etiology Inadequate protein-energy intake in setting of COVID PNA, septic shock, acute cholecystitis.     Signs/Symptoms 3+ edema; pt meeting <50% estimated energy needs >5 days.     Goal/Expected Outcome Once advanced pt to meet >50% estimated nutrient needs via tolerated route; not met     Nutrition Diagnosis is [x ] ongoing  [ ] resolved [ ] not applicable     New Nutrition Diagnosis: [x ] not applicable     Interventions:   Recommend  [ ] Change Diet To:  [ ] Nutrition Supplement  [ ] Nutrition Support  [x ] Other: swallow evaluation when appropriate     Monitoring and Evaluation:   [ ] PO intake [ x ] Tolerance to diet prescription [ x ] weights [ x ] labs [ x ] follow up per protocol  [ ] other: Assessment:  Pt seen in CCU, on COVID-19 isolation, lethargy noted.  Pt adm c induced hypothermia, AMS, sepsis, pneumonia, COVID, severe sepsis, lactic acidosis, DARREN c ATN, COVID pneumonia, hypernatremia, acute metabolic encephalopathy, low TSH and low T# & T4, Endocrine consult noted; c sick-euthyroid syndrome, initial concern of biliary sepsis decreased due to negative HIDA scan and decreasing ALP.  Pt is DNR.  PMH include dementia, asthma, HTN.    Factors impacting intake: [ ] none [ ] nausea  [ ] vomiting [ ] diarrhea [ ] constipation  [ ]chewing problems [x ] swallowing issues; 02/23, swallow evaluation attempted, pt refused oral trials  [x ] other: AMS, acute illness     Diet Prescription: Diet, Pureed:   No Liquids (NOLIQUIDS) (02-20-22 @ 14:36)    Intake: 0%    Current Weight: 02/23, 52.7 kg, 02/17, 47.1 kg, c wt. gain of 5.6 kg  % Weight Change: 11.8%  02/22, 3+ edema of arms noted   I/O: 931/250(+681)      Pertinent Medications: MEDICATIONS  (STANDING):  chlorhexidine 2% Cloths 1 Application(s) Topical <User Schedule>  dexAMETHasone  Injectable 6 milliGRAM(s) IV Push daily  DOPamine Infusion 5 MICROgram(s)/kG/Min (8.81 mL/Hr) IV Continuous <Continuous>  heparin   Injectable 5000 Unit(s) SubCutaneous every 12 hours    MEDICATIONS  (PRN):    Pertinent Labs: 02-23 Na145 mmol/L Glu 99 mg/dL K+ 3.8 mmol/L Cr  1.09 mg/dL BUN 32 mg/dL<H> 02-23 Phos 2.8 mg/dL 02-22 Alb 1.7 g/dL<L>02-22 ALT 26 U/L AST 26 U/L Alkaline Phosphatase 128 U/L<H>   CAPILLARY BLOOD GLUCOSE        Skin:   Skin tear; right arm noted     Estimated Needs:   [ x] no change since previous assessment(02/19)  [ ] recalculated:     Previous Nutrition Diagnosis: (02/19)  Malnutrition Severe malnutrition in the context of acute illness.     Etiology Inadequate protein-energy intake in setting of COVID PNA, septic shock, acute cholecystitis.     Signs/Symptoms 3+ edema; pt meeting <50% estimated energy needs >5 days.     Goal/Expected Outcome Once advanced pt to meet >50% estimated nutrient needs via tolerated route; not met     Nutrition Diagnosis is [x ] ongoing  [ ] resolved [ ] not applicable     New Nutrition Diagnosis: [x ] not applicable     Interventions:   Recommend  [ ] Change Diet To:  [ ] Nutrition Supplement  [ ] Nutrition Support  [x ] Other: swallow re-evaluation when pt is appropriate     Monitoring and Evaluation:   [ ] PO intake [ x ] Tolerance to diet prescription [ x ] weights [ x ] labs [ x ] follow up per protocol  [ ] other:

## 2022-02-23 NOTE — CHART NOTE - NSCHARTNOTEFT_GEN_A_CORE
89 YO female with PMH asthma (not on meds), HTN (not on meds), dementia (baseline is that pt can walk and talk but needs assistance with all ADLs), presents for lethargy, weakness, FTT over the last 2-3 days. Pt received first dose Pfizer 2 weeks ago.  In the ED, pt found to be COVID positive with labs significant for lactate 6.8, creatinine 1.5, sodium 147.  CT head negative for acute pathology.  CXR with patchy opacity of right lung.  Pt was given 1.5L of IVF and remained hypotensive peripheral levophed started.    Admit to ICU for septic shock 2/2 COVID pneumonia.pt became benjamín to the 20's ~3am, given 0.5 L bolus. Pt also became agitated at 9pm last night, grabbing at nurses, given 2.5 mg haldol.

## 2022-02-23 NOTE — PROGRESS NOTE ADULT - ATTENDING COMMENTS
24 hr events:  weaned off levophed as BP was high but HR remained in the 30-50s on levo  placed on dopamine yesterday: still bradycardic HR 40-50s, unable to wean down dopamine as HR drops to the upper 20s/low 30s  pt lethargic, intermittently at times arousable      89F PMH asthma (not on meds), HTN (not on meds), dementia (baseline can walk and talk but needs assistance with all ADLs), presents for lethargy and weakness with decreased PO intake over 2-3 days. s/p 1st dose of Pfizer COVID vaccination 2 weeks prior. Hypotensive in ED, lactate 6.8, Cr 1.5, sodium 147. CXR with bibasilar infiltrates. Tested + for COVID. Admitted to ICU for severe sepsis/septic shock, lactic acidosis, DARREN with ATN, COVID PNA, hypernatremia, acute metabolic encephalopathy. Now with gram + cocci in clusters in blood cx (gram + bacteremia). Course also with bradycardia HR 30-40s. Found to have low TSH and low T3 T4    NEURO  lethargic but arousable at times  will cont to monitor  baseline dementia: son reports pt previously able to still speak answering yes/no questions    CV  off levophed  off midodrine due to bradycardia  now on dopamine but still with bradycardia HR 40-50s  attempted to wean down dopamine today and HR dropped to the 20s-30s with hypotension MAP 40s  ? bradyarrhythmia due to conduction disease with advanced age vs conduction issues with underlying COVID infection  ?remdesivir causing bradycardia (completed her course, so will see if HR improved off medication)  cariology eval: ? need for pacemaker - however likely will need acute active infectious process controlled first ?if pt a candidate for device placement with poor functional status right now    PULM  COVID: s/p 5 day course of remdesivir  cont on 10 day course of dexamethasone  nasal cannula O2 supplementation as needed  maintain O2 sat > 90%    RENAL  renal function improved  hypernatremia resolved: d/c D5 LR @ 50cc/hr    ID  blood cx with coag negative staph, likely a contaminant  initial concerns for biliary source of infection with negative HIDA work up- acute cholecystitis ruled out  empirically on zosyn, will complete a 5 day course     ENDO  pt with labs showing low TSH but also with low T3 T4  will repeat levels  ?if pt with underlying sick euthyroid  endo eval appreciated    GEN  DVT prophylaxis: heparin sc  full code for now  d/w son Shiv today as well regarding overall poor nutritional intake. comfort measures vs peg and continued aggressive care reviewed with son. he needs some time to think and discuss with rest of family.  will have palliative care eval see patient 24 hr events:  bradycardic to the 30s and hypotensive to the 50s this morning while on dopamine  unresponsive during event  dopamine dose increased  mental status improved this afternoon, more awake with son at bedside  anemic on labs with Hb 6.4  transfused 1 unit pRBC      89F PMH asthma (not on meds), HTN (not on meds), dementia (baseline can walk and talk but needs assistance with all ADLs), presents for lethargy and weakness with decreased PO intake over 2-3 days. s/p 1st dose of Pfizer COVID vaccination 2 weeks prior. Hypotensive in ED, lactate 6.8, Cr 1.5, sodium 147. CXR with bibasilar infiltrates. Tested + for COVID. Admitted to ICU for severe sepsis/septic shock, lactic acidosis, DARREN with ATN, COVID PNA, hypernatremia, acute metabolic encephalopathy. Course also with bradycardia HR 30-40s. Found to have low TSH and low T3 T4    NEURO  lethargic and unresponsive this morning during event of worsening bradycardia and hypotension  more awake this afternoon      CV  off levophed  off midodrine due to bradycardia  dopamine dose increased due to bradycardic/hypotensive/unresponsive event this morning  ? bradyarrhythmia due to conduction disease with advanced age vs conduction issues with underlying COVID infection  ?remdesivir causing bradycardia (completed her course, so will see if HR improved off medication)  cariology eval: ? need for pacemaker - ?if pt a candidate for device placement with poor functional status right now    PULM  COVID: s/p 5 day course of remdesivir  cont on 10 day course of dexamethasone  on RA today maintaining O2 sat > 90%    RENAL  renal function improved  hypernatremia resolved: s/p D5 LR     ID  blood cx with coag negative staph, likely a contaminant  initial concerns for biliary source of infection with negative HIDA work up- acute cholecystitis ruled out  completed an empiric 5 day course of zosyn    ENDO  pt with labs showing low TSH but also with low T3 T4  repeat blood work resulted with similar values  will have endo input on findings : ? underlying endocrine issue causing bradycardia    GEN  DVT prophylaxis: heparin sc  had GOC discussion with son Shiv yesterday and family was thinking about advanced directives but no decision made  with events this morning, son called by ICU team and GOC readdressed with son  pt made DNR by annalee CAMARGO in chart

## 2022-02-24 NOTE — PROGRESS NOTE ADULT - SUBJECTIVE AND OBJECTIVE BOX
CHIEF COMPLAINT:  Patient is a 89y old  Female who presents with a chief complaint of hypotension, COVID pneumonia (20 Feb 2022 18:34)      HPI:  89 y/o F w/dementia admitted for hypotension due to severe sepsis with septic shock secondary to COVID-19 PNA and DARREN likely secondary to ATN/sepsis/COVID with hospital course c/b persistent bradyarrythmias causing hypotension requiring continuous dopamine support as well as acute blood loss anemia.  HCT 27  Echo with nl LVEF moderate TR      ALLERGIES:  No Known Allergies      Home Medications:  unknown    PAST MEDICAL & SURGICAL HISTORY:  HTN (hypertension)    Asthma    Dementia    No significant past surgical history          FAMILY HISTORY:  Family history of cancer (Sibling)  Cousin        SOCIAL HISTORY:  unknown    REVIEW OF SYSTEMS:  unable    PHYSICAL EXAM:  Vital Signs Last 24 Hrs  T(C): 36.9 (24 Feb 2022 16:49), Max: 37.1 (24 Feb 2022 12:00)  T(F): 98.5 (24 Feb 2022 16:49), Max: 98.7 (24 Feb 2022 12:00)  HR: 70 (24 Feb 2022 15:30) (58 - 112)  BP: 129/43 (24 Feb 2022 15:30) (44/29 - 163/58)  BP(mean): 65 (24 Feb 2022 15:30) (32 - 100)  RR: 18 (24 Feb 2022 15:30) (7 - 20)  SpO2: 95% (24 Feb 2022 15:30) (64% - 100%)      Tele: Sinus rhythm and sinus bradycardia.      Eyes: eyelids demonstrated no xanthelasmas.   HEENT: no oral cyanosis.   Neck: normal jugular venous A waves present, normal jugular venous V waves present and no jugular venous bravo A waves.   Pulmonary: no respiratory distress however, diminished breath sounds and scattered coarse rhonchi bilaterally.  Cardiovascular: heart rate and rhythm were normal, normal S1 and S2 and no murmur.  Abdomen: soft, non-tender  Musculoskeletal: the gait could not be assessed.   Extremities: no clubbing of the fingernails, no localized cyanosis, no petechial hemorrhages and no ischemic changes.   Skin: normal skin color and pigmentation, no rash, no venous stasis, no skin lesions, no skin ulcer and no xanthoma was observed.       MEDICATIONS  (STANDING):  chlorhexidine 2% Cloths 1 Application(s) Topical <User Schedule>  dexAMETHasone  Injectable 6 milliGRAM(s) IV Push daily  DOPamine Infusion 5 MICROgram(s)/kG/Min (8.81 mL/Hr) IV Continuous <Continuous>  heparin   Injectable 5000 Unit(s) SubCutaneous every 12 hours      LABORATORY:                                   9.1    11.55 )-----------( 125      ( 24 Feb 2022 04:34 )             27.4     02-24    142  |  113<H>  |  29<H>  ----------------------------<  160<H>  3.6   |  23  |  1.10    Ca    8.1<L>      24 Feb 2022 06:23  Phos  3.2     02-24  Mg     2.0     02-24    TPro  5.6<L>  /  Alb  1.8<L>  /  TBili  0.9  /  DBili  x   /  AST  15  /  ALT  22  /  AlkPhos  123<H>  02-24            IMAGING:  < from: 12 Lead ECG (02.17.22 @ 01:41) >   Sinus rhythm  premature atrial complexes  Abnormal ECG  When compared with ECG of 07-MAY-2020 14:04,  Nonspecific T wave abnormality now evident in Anterolateral leads    < end of copied text >    < from: US Duplex Venous Upper Ext Complete, Bilateral (02.19.22 @ 19:28) >  No evidence of deep venous thrombosis in either upper extremity.    < end of copied text >    < from: NM Hepatobiliary Imaging (02.18.22 @ 18:00) >  No scan evidence of acute cholecystitis.    < end of copied text >      < from: TTE Echo Complete w/o Contrast w/ Doppler (02.22.22 @ 09:25) >  Summary:   1. Left ventricular ejection fraction, by visual estimation, is 65 to   70%.   2. Normal global left ventricular systolic function.   3. Limited imaging; patient not cooperative and could not be adequately   positioned.   4. Mild thickening and calcification of the anterior and posterior   mitral valve leaflets.   5. Trace mitral valve regurgitation.   6. Moderate tricuspid regurgitation.   7. Estimated pulmonary artery systolic pressure is 40.3 mmHg assuming a   right atrial pressure of 5 mmHg, which is consistent with mild pulmonary   hypertension.   8. Mitral valve mean gradient is 2.0 mmHg consistent with mild mitral   stenosis.      [id number] Miki Rice MD, [facility]  Electronically signed on 2/22/2022 at 12:56:19 PM    < end of copied text >

## 2022-02-24 NOTE — PROGRESS NOTE ADULT - ASSESSMENT
89 y/o F w/dementia admitted for hypotension due to severe sepsis with septic shock secondary to COVID-19 PNA and DARREN likely secondary to ATN/sepsis/COVID with hospital course c/b persistent bradyarrythmias causing hypotension requiring continuous dopamine support as well as acute blood loss anemia.  HCT 27  Echo with nl LVEF moderate TR  Continuing tx for COVID  Monitoring HRs for now... appear better.  Weaning of dopa as tolerated. 89 y/o F w/dementia admitted for hypotension due to severe sepsis with septic shock secondary to COVID-19 PNA and DARREN likely secondary to ATN/sepsis/COVID with hospital course c/b persistent bradyarrythmias causing hypotension requiring continuous dopamine support as well as acute blood loss anemia.  HCT 27  Echo with nl LVEF moderate TR  Continuing tx for COVID  Monitoring HRs for now... appear better.  Weaning of dopa as tolerated.  Currently DNR.. will need to be rescinded in PPM needed

## 2022-02-24 NOTE — PROGRESS NOTE ADULT - SUBJECTIVE AND OBJECTIVE BOX
SHAMAR GUILLERMO  MRN-[id number]      Follow Up:  covid     Interval History: patient seen and examined. awake, HR is better, confused     ROS:    [X ] Unobtainable because: poor mental status   [ ] All other systems negative except as noted          Allergies  No Known Allergies        ANTIMICROBIALS:        MEDICATIONS  (STANDING):  dexAMETHasone  Injectable 6 daily  DOPamine Infusion 5 <Continuous>  heparin   Injectable 5000 every 12 hours      PRN      Physical Exam:  Vital Signs Last 24 Hrs  T(F): 98.7 (22 @ 12:00), Max: 98.7 (22 @ 12:00)  HR: 70 (22 @ 15:30)  BP: 129/43 (22 @ 15:30)  RR: 18 (22 @ 15:30)  SpO2: 95% (22 @ 15:30) (64% - 100%)  Wt(kg): --    General:    NAD,  non toxic, on room air   Head: atraumatic, normocephalic  Eye: normal sclera and conjunctiva  ENT:    no oral lesions, neck supple  Cardio:     normal HR today, S1, S2,  no murmur  Respiratory:    clear b/l,    no wheezing  abd:     soft,   BS +,   no tenderness today   : no SPT  Musculoskeletal:   no joint swelling,   no edema  vascular: no central lines, +PIV   Skin:    no rash, right arm is very edematous though nontender and not warm   Neurologic:    poor mental status but awake and calm   psych: difficult to assess     WBC Count: 11.55 K/uL ( @ 04:34)  WBC Count: 12.69 K/uL ( @ 20:08)  WBC Count: 10.17 K/uL ( @ 04:10)  WBC Count: 10.72 K/uL ( @ 02:52)  WBC Count: 10.41 K/uL ( @ 02:30)  WBC Count: 10.26 K/uL ( @ 02:48)  WBC Count: 11.63 K/uL ( @ 04:33)                            9.1    11.55 )-----------( 125      ( 2022 04:34 )             27.4           142  |  113<H>  |  29<H>  ----------------------------<  160<H>  3.6   |  23  |  1.10    Ca    8.1<L>      2022 06:23  Phos  3.2       Mg     2.0         TPro  5.6<L>  /  Alb  1.8<L>  /  TBili  0.9  /  DBili  x   /  AST  15  /  ALT  22  /  AlkPhos  123<H>        Creatinine Trend: 1.10<--, 1.09<--, 1.09<--, 1.11<--, 1.13<--, 1.30<--          Urinalysis Basic - ( 2022 01:52 )    Color: Yellow / Appearance: Clear / S.010 / pH: x  Gluc: x / Ketone: Trace  / Bili: Negative / Urobili: 1 mg/dL   Blood: x / Protein: 100 mg/dL / Nitrite: Negative   Leuk Esterase: Negative / RBC: 3-5 /HPF / WBC 0-2   Sq Epi: x / Non Sq Epi: Few / Bacteria: Many        Creatinine Trend: 1.22<--, 1.15<--, 1.52<--  Lactate, Blood: 1.3 mmol/L (22 @ 10:28)  Lactate, Blood: 4.5 mmol/L (22 @ 01:52)  Lactate, Blood: 6.8 mmol/L (22 @ 00:24)      MICROBIOLOGY:  v  Clean Catch Clean Catch (Midstream)  22   No growth  --  --      .Blood Blood-Peripheral  22   No growth to date.  --  --      Rapid RVP Result: Detected ( @ 00:24)        C-Reactive Protein, Serum: 293 ()    Ferritin, Serum: 914 ()      D-Dimer Assay, Quantitative: 2037 ()    Procalcitonin, Serum: 0.62 (22 @ 16:45)    SARS-CoV-2: Detected (22 @ 00:24)  Rapid RVP Result: Detected (22 @ 00:24)        RADIOLOGY:  < from: US Abdomen Limited (02.18.22 @ 10:43) >  IMPRESSION:    Distended gallbladder with gallstones and sludge. Wall thickening present.  Questionable focal tenderness. Recommend nuclear medicine hepatobiliary   scan for further assessment.  Dilated biliary and pancreatic ducts.

## 2022-02-24 NOTE — PROGRESS NOTE ADULT - ASSESSMENT
89 YO female with PMH asthma (not on meds), HTN (not on meds), dementia (baseline is that pt can walk and talk but needs assistance with all ADLs), presents for lethargy, weakness, FTT over the last 2-3 days. Pt received first dose Pfizer 2 weeks ago.  In the ED, pt found to be COVID positive with labs significant for lactate 6.8, creatinine 1.5, sodium 147.  CT head negative for acute pathology.  CXR with patchy opacity of right lung.  Pt was given 1.5L of IVF and remained hypotensive peripheral levophed started.  Admit to ICU for septic shock 2/2 COVID pneumonia  asked to evaluate for possible tocilizumab. She came in and documentation shows 100% on room air then she was placed on NRB but since then she had been weaned to venti mask.  Her platelets are low and close to the threshold of 50k   she had a very high lactate and there may be a develop bacterial process which would exclude her from receiving tocilizumab     2/18: patient has been weaned off oxygen successfully , has elevated alk phos and tender abdomen so US was done and shows possible acute cholecystitis started on zosyn, deemed not a surgical candidate, if necessary can get perc cholecystotomy tube, HIDA scan pending    2/20: HIDA negative, pain is improved, more awake, LFTs normal, will continue zosyn for total of 5-7 days    2/22: at this juncture, given the negative HIDA annd benign abdominal exam would suggest stopping antibiotics. She is off oxygen from a covid standpoint and would suggest stopping decadron as there is little evidence to support its use in mild covid infections in patients who are not requiring oxygen. CCU having ongoing discussions regarding GOC.     2/24: off oxygen, HR better, normal LFTs, completed course of zosyn     COVID  ARF   functional quadriplegia   high serum lactate  concern for acute cholecystitis     Plan:    #COVID  Monitor clinically.  Monitor Oxygenation  O2 supplementation if needed   Remdesivir x5 days  completed   Monitor CBC, CMP daily  Ferritin, CRP, and D dimer q 48 hrs.  please stop IV Dexamethasone  Anticoagulation per protocol.  Treatment options are limited  Monitor for any bacterial superinfection/complications.  This tx plan was formulated utilizing my clinical judgement, currently available local/regional anecdotal information, organizational treatment recommendations with COVID-19 specific considerations given rapidly changing information available.   not a candidate for tocilizumab at this kal     #Acute Respiratory Failure- resolved   chest PT  routine PT  OOB to chair if able   Inhalers-MDI and avoid nebulizers except in negative pressure room     #high serum lactate-resolved    send lactate if worsens   pressors per icu   complete antibiotics course     #acute cholecystitis-ruled out   surgery recommendations appreciated   zosyn for 5 days now complete   trend LFTs   perc choley only if necessary     D/W CCU team    will sign off. Please call with questions     Alin Resendez, DO  Infectious Disease Attending  Reachable via Microsoft Teams or [place] office: [phone]  After 5pm/weekends please call [phone] for all inquiries and new consults

## 2022-02-24 NOTE — PROGRESS NOTE ADULT - SUBJECTIVE AND OBJECTIVE BOX
HPI:  89 YO female with PMH asthma (not on meds), HTN (not on meds), dementia (baseline is that pt can walk and talk but needs assistance with all ADLs), presents for lethargy, weakness, FTT over the last 2-3 days. Pt received first dose Pfizer 2 weeks ago.  In the ED, pt found to be COVID positive with labs significant for lactate 6.8, creatinine 1.5, sodium 147.  CT head negative for acute pathology.  CXR with patchy opacity of right lung.  Pt was given 1.5L of IVF and remained hypotensive peripheral levophed started.    Admit to ICU for septic shock 2/2 COVID pneumonia. (17 Feb 2022 04:24)      24 hr events: No acute events.     ## ROS:  [ ] unable to obtain  CONSTITUTIONAL: No fever, weight loss, or fatigue  EYES: No eye pain, visual disturbances, or discharge  ENMT:  No difficulty hearing, tinnitus, vertigo; No sinus or throat pain  NECK: No pain or stiffness  RESPIRATORY: No cough, wheezing, chills or hemoptysis; No shortness of breath  CARDIOVASCULAR: No chest pain, palpitations, dizziness, or leg swelling  GASTROINTESTINAL: No abdominal or epigastric pain. No nausea, vomiting, or hematemesis; No diarrhea or constipation. No melena or hematochezia.  GENITOURINARY: No dysuria, frequency, hematuria, or incontinence  NEUROLOGICAL: No headaches, memory loss, loss of strength, numbness, or tremors  SKIN: No itching, burning, rashes, or lesions   LYMPH NODES: No enlarged glands  ENDOCRINE: No heat or cold intolerance; No hair loss  MUSCULOSKELETAL: No joint pain or swelling; No muscle, back, or extremity pain  PSYCHIATRIC: No depression, anxiety, mood swings, or difficulty sleeping  HEME/LYMPH: No easy bruising, or bleeding gums  ALLERGY AND IMMUNOLOGIC: No hives or eczema    ## Vitals  ICU Vital Signs Last 24 Hrs  T(C): 36.1 (24 Feb 2022 05:00), Max: 37.1 (23 Feb 2022 16:00)  T(F): 97 (24 Feb 2022 05:00), Max: 98.7 (23 Feb 2022 16:00)  HR: 74 (24 Feb 2022 07:00) (57 - 114)  BP: 158/58 (24 Feb 2022 07:00) (62/19 - 176/69)  BP(mean): 79 (24 Feb 2022 07:00) (26 - 94)  ABP: --  ABP(mean): --  RR: 17 (24 Feb 2022 07:00) (7 - 30)  SpO2: 98% (24 Feb 2022 07:00) (64% - 100%)      ## Physical Exam:  Gen:  HEENT:  Resp:  CV:  Abd:  Ext:  Neuro:    ## Vent Data      ## Labs:  Chem:  02-24    142  |  113<H>  |  29<H>  ----------------------------<  160<H>  3.6   |  23  |  1.10    Ca    8.1<L>      24 Feb 2022 06:23  Phos  3.2     02-24  Mg     2.0     02-24    TPro  5.6<L>  /  Alb  1.8<L>  /  TBili  0.9  /  DBili  x   /  AST  15  /  ALT  22  /  AlkPhos  123<H>  02-24    LIVER FUNCTIONS - ( 24 Feb 2022 06:23 )  Alb: 1.8 g/dL / Pro: 5.6 gm/dL / ALK PHOS: 123 U/L / ALT: 22 U/L / AST: 15 U/L / GGT: x           CBC:                        9.1    11.55 )-----------( 125      ( 24 Feb 2022 04:34 )             27.4     Coags:          ## Cardiac        ## Blood Gas      #I/Os  I&O's Detail    23 Feb 2022 07:01  -  24 Feb 2022 07:00  --------------------------------------------------------  IN:    DOPamine Infusion: 473.6 mL    PRBCs (Packed Red Blood Cells): 350 mL  Total IN: 823.6 mL    OUT:    Indwelling Catheter - Urethral (mL): 480 mL  Total OUT: 480 mL    Total NET: 343.6 mL          ## Imaging:    ## Medications:  MEDICATIONS  (STANDING):  chlorhexidine 2% Cloths 1 Application(s) Topical <User Schedule>  dexAMETHasone  Injectable 6 milliGRAM(s) IV Push daily  DOPamine Infusion 5 MICROgram(s)/kG/Min (8.81 mL/Hr) IV Continuous <Continuous>  heparin   Injectable 5000 Unit(s) SubCutaneous every 12 hours  potassium chloride  10 mEq/100 mL IVPB 10 milliEquivalent(s) IV Intermittent every 1 hour    MEDICATIONS  (PRN):       HPI:  89 YO female with PMH asthma (not on meds), HTN (not on meds), dementia (baseline is that pt can walk and talk but needs assistance with all ADLs), presents for lethargy, weakness, FTT over the last 2-3 days. Pt received first dose Pfizer 2 weeks ago.  In the ED, pt found to be COVID positive with labs significant for lactate 6.8, creatinine 1.5, sodium 147.  CT head negative for acute pathology.  CXR with patchy opacity of right lung.  Pt was given 1.5L of IVF and remained hypotensive peripheral levophed started.    Admit to ICU for septic shock 2/2 COVID pneumonia. (17 Feb 2022 04:24)      24 hr events: No acute events. Feels well this morning. No chest pain, dyspnea, fevers, chills, nausea, emesis, diarrhea, or dizziness    ## ROS:  See above. ROS otherwise negative.    ## Vitals  ICU Vital Signs Last 24 Hrs  T(C): 36.1 (24 Feb 2022 05:00), Max: 37.1 (23 Feb 2022 16:00)  T(F): 97 (24 Feb 2022 05:00), Max: 98.7 (23 Feb 2022 16:00)  HR: 74 (24 Feb 2022 07:00) (57 - 114)  BP: 158/58 (24 Feb 2022 07:00) (62/19 - 176/69)  BP(mean): 79 (24 Feb 2022 07:00) (26 - 94)  ABP: --  ABP(mean): --  RR: 17 (24 Feb 2022 07:00) (7 - 30)  SpO2: 98% (24 Feb 2022 07:00) (64% - 100%)      ## Physical Exam:  Gen: Elderly female lying comfortably in bed, NAD  HEENT: NC/AT sclerae anicteric  Resp: No increased WOB, CTAB  CV: S1, S2  Abd: Soft, + BS  Ext: WWP  Neuro: Awake, alert follows commands. Moves all extremities    ## Vent Data      ## Labs:  Chem:  02-24    142  |  113<H>  |  29<H>  ----------------------------<  160<H>  3.6   |  23  |  1.10    Ca    8.1<L>      24 Feb 2022 06:23  Phos  3.2     02-24  Mg     2.0     02-24    TPro  5.6<L>  /  Alb  1.8<L>  /  TBili  0.9  /  DBili  x   /  AST  15  /  ALT  22  /  AlkPhos  123<H>  02-24    LIVER FUNCTIONS - ( 24 Feb 2022 06:23 )  Alb: 1.8 g/dL / Pro: 5.6 gm/dL / ALK PHOS: 123 U/L / ALT: 22 U/L / AST: 15 U/L / GGT: x           CBC:                        9.1    11.55 )-----------( 125      ( 24 Feb 2022 04:34 )             27.4     Coags:          ## Cardiac        ## Blood Gas      #I/Os  I&O's Detail    23 Feb 2022 07:01  -  24 Feb 2022 07:00  --------------------------------------------------------  IN:    DOPamine Infusion: 473.6 mL    PRBCs (Packed Red Blood Cells): 350 mL  Total IN: 823.6 mL    OUT:    Indwelling Catheter - Urethral (mL): 480 mL  Total OUT: 480 mL    Total NET: 343.6 mL          ## Imaging:    ## Medications:  MEDICATIONS  (STANDING):  chlorhexidine 2% Cloths 1 Application(s) Topical <User Schedule>  dexAMETHasone  Injectable 6 milliGRAM(s) IV Push daily  DOPamine Infusion 5 MICROgram(s)/kG/Min (8.81 mL/Hr) IV Continuous <Continuous>  heparin   Injectable 5000 Unit(s) SubCutaneous every 12 hours  potassium chloride  10 mEq/100 mL IVPB 10 milliEquivalent(s) IV Intermittent every 1 hour    MEDICATIONS  (PRN):

## 2022-02-24 NOTE — PROGRESS NOTE ADULT - ASSESSMENT
89 y/o F w/dementia admitted for hypotension due to severe sepsis with septic shock secondary to COVID-19 PNA and DARREN likely secondary to ATN/sepsis/COVID with hospital course c/b persistent bradyarrythmias causing hypotension requiring continuous dopamine support as well as acute blood loss anemia.    - Continue dopamine, wean as tolerated  - Appreciate cardiology consult  - Complete dexamethasone, completed remdesivir  - Trend CBC, transfuse PRN for hgb < 7  - Trend Cr, avoid nephrotoxins  - Appreciate endo recs, no treatment for likely sick euthyroid  - DVT prophylaxis  - DNR  - Guarded prognosis    I have personally provided 35 minutes of attending critical care time excluding procedures.

## 2022-02-24 NOTE — PROGRESS NOTE ADULT - SUBJECTIVE AND OBJECTIVE BOX
Patient is a 89y old  Female who presents with a chief complaint of hypotension, COVID pneumonia (24 Feb 2022 18:01)      Interval History: Thyroid function tests are consistent with sick thyroid syndrome    MEDICATIONS  (STANDING):  chlorhexidine 2% Cloths 1 Application(s) Topical <User Schedule>  DOPamine Infusion 5 MICROgram(s)/kG/Min (8.81 mL/Hr) IV Continuous <Continuous>  heparin   Injectable 5000 Unit(s) SubCutaneous every 12 hours    MEDICATIONS  (PRN):      Allergies    No Known Allergies    Intolerances        REVIEW OF SYSTEMS:  CONSTITUTIONAL: no changes  EYES: No eye pain, visual disturbances, or discharge  ENMT:  No difficulty hearing, No sinus or throat pain  NECK: No pain or stiffness  RESPIRATORY: No cough, wheezing, chills or hemoptysis; No shortness of breath  CARDIOVASCULAR: No chest pain, palpitations or leg swelling  GASTROINTESTINAL: No abdominal or epigastric pain. No nausea, vomiting, or hematemesis; No diarrhea or constipation. No melena or hematochezia.  GENITOURINARY: No dysuria, frequency, hematuria, or incontinence  NEUROLOGICAL: No headaches, memory loss, loss of strength, numbness, or tremors  SKIN: No itching, burning, rashes, or lesions   ENDOCRINE: No heat or cold intolerance; No hair loss  MUSCULOSKELETAL: No joint pain or swelling; No muscle, back, or extremity pain  PSYCHIATRIC: No depression, anxiety, mood swings, or difficulty sleeping  HEME/LYMPH: No easy bruising, or bleeding gums  ALLERY AND IMMUNOLOGIC: No hives or eczema    Vital Signs Last 24 Hrs  T(C): 36.9 (24 Feb 2022 16:49), Max: 37.1 (24 Feb 2022 12:00)  T(F): 98.5 (24 Feb 2022 16:49), Max: 98.7 (24 Feb 2022 12:00)  HR: 87 (24 Feb 2022 22:30) (61 - 112)  BP: 121/56 (24 Feb 2022 22:30) (44/29 - 163/58)  BP(mean): 72 (24 Feb 2022 22:30) (32 - 100)  RR: 14 (24 Feb 2022 22:30) (7 - 20)  SpO2: 99% (24 Feb 2022 22:30) (64% - 100%)    PHYSICAL EXAM:  GENERAL:   HEAD: Atraumatic, Normocephalic  EYES: PERRLA, conjunctiva and sclera clear  ENMT: No  exudates,; Moist mucous membranes,, No lesions  NECK: Supple, No JVD, Normal thyroid  NERVOUS SYSTEM:  Alert & Oriented,   CHEST/LUNG: Clear to auscultation bilaterally; No rales, rhonchi, wheezing, or rubs  HEART: Regular rate and rhythm; No murmurs, rubs, or gallops  ABDOMEN: Soft, Nontender, Nondistended; Bowel sounds present  EXTREMITIES:  2+ Peripheral Pulses, no edema  SKIN: No rashes or lesions    LABS:        CAPILLARY BLOOD GLUCOSE      POCT Blood Glucose.: 104 mg/dL (24 Feb 2022 05:08)    Lipid panel:           Thyroid:  Diabetes Tests:  Parathyroid Panel:  Adrenals:  RADIOLOGY & ADDITIONAL TESTS:    Imaging Personally Reviewed:  [ ] YES  [ ] NO    Consultant(s) Notes Reviewed:  [ ] YES  [ ] NO    Care Discussed with Consultants/Other Providers [ ] YES  [ ] NO

## 2022-02-25 NOTE — PROGRESS NOTE ADULT - SUBJECTIVE AND OBJECTIVE BOX
24 hr events:  not as alert but responsive  nodding to yes/no questions  appetite poor today: refusing to eat  weaning down on dopamine HR  in the 70s-90s    ## ROS:  [x ] limited due to pt's responsiveness  shook head no when asked:   do you have SOB  do you have CP  do you have abdominal pain  are you hungry    nodded head yes to:  are you tired    ## Labs:  CBC:                        9.9    11.35 )-----------( 174      ( 25 Feb 2022 02:50 )             29.4     Chem:  02-25    141  |  110<H>  |  28<H>  ----------------------------<  154<H>  3.9   |  26  |  0.87    Ca    8.2<L>      25 Feb 2022 02:50  Phos  2.4     02-25  Mg     1.9     02-25    TPro  5.5<L>  /  Alb  1.9<L>  /  TBili  0.7  /  DBili  x   /  AST  13<L>  /  ALT  22  /  AlkPhos  112  02-25        ## Medications:  DOPamine Infusion 4.999 MICROgram(s)/kG/Min IV Continuous <Continuous>        heparin   Injectable 5000 Unit(s) SubCutaneous every 12 hours          ## Vitals:  T(C): 37.3 (02-25-22 @ 19:30), Max: 37.3 (02-25-22 @ 19:30)  HR: 94 (02-25-22 @ 22:00) (70 - 116)  BP: 109/51 (02-25-22 @ 22:00) (80/45 - 154/55)  BP(mean): 62 (02-25-22 @ 22:00) (52 - 88)  RR: 15 (02-25-22 @ 22:00) (12 - 21)  SpO2: 94% (02-25-22 @ 21:30) (90% - 100%)        02-24 @ 07:01  -  02-25 @ 07:00  --------------------------------------------------------  IN: 581.2 mL / OUT: 495 mL / NET: 86.2 mL    02-25 @ 07:01  - 02-25 @ 23:06  --------------------------------------------------------  IN: 306 mL / OUT: 350 mL / NET: -44 mL          ## P/E:  Gen: elderly female, lying comfortably in bed in no apparent distress, arousable but not speaking today, will nod to yes/no questions  HEENT: NC/AT  Resp: CTA B/L  CVS: RRR  Abd: soft NT/ND +BS  Ext: no c/c/e  Neuro: arousable, follows simple commands    CENTRAL LINE: [ ] YES [x ] NO  LOCATION:   DATE INSERTED:  REMOVE: [ ] YES [ ] NO      CABA: [ ] YES [ x] NO    DATE INSERTED:  REMOVE:  [ ] YES [ ] NO      A-LINE:  [ ] YES [x ] NO  LOCATION:   DATE INSERTED:  REMOVE:  [ ] YES [ ] NO  EXPLAIN:    GLOBAL ISSUE/BEST PRACTICE:  Analgesia: n/a  Sedation: n/a  HOB elevation: yes  Stress ulcer prophylaxis: n/a  VTE prophylaxis: heparin sc  Oral Care: n/a  Glycemic control: yes  Nutrition: encouraging PO diet    CODE STATUS: [ ] full code  [x ] DNR  [ ] DNI  [x ] [facility]  Goals of care discussion: [ x] yes

## 2022-02-25 NOTE — PROGRESS NOTE ADULT - ASSESSMENT
89F PMH asthma (not on meds), HTN (not on meds), dementia (baseline can walk and talk but needs assistance with all ADLs), presents for lethargy and weakness with decreased PO intake over 2-3 days. s/p 1st dose of Pfizer COVID vaccination 2 weeks prior. Hypotensive in ED, lactate 6.8, Cr 1.5, sodium 147. CXR with bibasilar infiltrates. Tested + for COVID. Admitted to ICU for severe sepsis/septic shock, lactic acidosis, DARREN with ATN, COVID PNA, hypernatremia, acute metabolic encephalopathy. Course also with bradycardia HR 30-40s. Found to have low TSH and low T3 T4, sick euthyroid syndrome    NEURO  waxing waning mental status  responsive today but less verbal      CV  off levophed  off midodrine due to bradycardia  on dopamine weaning down dose as HR tolerates  goal to maintain SBP > 90      PULM  COVID: s/p 5 day course of remdesivir  cont on 10 day course of dexamethasone  on RA today maintaining O2 sat > 90%    RENAL  renal function improved  hypernatremia resolved: s/p D5 LR     ID  blood cx with coag negative staph, likely a contaminant  repeat blood cx negative  initial concerns for biliary source of infection with negative HIDA work up- acute cholecystitis ruled out  completed an empiric 5 day course of zosyn    ENDO  pt with labs showing low TSH but also with low T3 T4  appreciate endo eval: no further work up needed, likely with sick euthyroid syndrome    GEN  DVT prophylaxis: heparin sc  GOC discussion with son Shiv: family amenable for DNR   no escalation in care in situation of clinical decline  RAMAN in chart

## 2022-02-25 NOTE — PROGRESS NOTE ADULT - SUBJECTIVE AND OBJECTIVE BOX
Patient is a 89y old  Female who presents with a chief complaint of hypotension, COVID pneumonia (24 Feb 2022 18:01)      Interval History: doing poorly   septic shock and on Dopamine     MEDICATIONS  (STANDING):  chlorhexidine 2% Cloths 1 Application(s) Topical <User Schedule>  DOPamine Infusion 5 MICROgram(s)/kG/Min (8.81 mL/Hr) IV Continuous <Continuous>  heparin   Injectable 5000 Unit(s) SubCutaneous every 12 hours    MEDICATIONS  (PRN):      Allergies    No Known Allergies    Intolerances        REVIEW OF SYSTEMS:  CONSTITUTIONAL: no changes    Vital Signs Last 24 Hrs  T(C): 36.7 (25 Feb 2022 07:30), Max: 37.1 (24 Feb 2022 12:00)  T(F): 98 (25 Feb 2022 07:30), Max: 98.7 (24 Feb 2022 12:00)  HR: 76 (25 Feb 2022 08:00) (63 - 116)  BP: 104/47 (25 Feb 2022 08:00) (44/29 - 157/66)  BP(mean): 60 (25 Feb 2022 08:00) (32 - 100)  RR: 15 (25 Feb 2022 08:00) (13 - 21)  SpO2: 98% (25 Feb 2022 08:00) (67% - 100%)    PHYSICAL EXAM:  GENERAL: as per the progress notes of Primary Team   HEAD: Atraumatic, Normocephalic    LABS:        CAPILLARY BLOOD GLUCOSE        Lipid panel:           Thyroid:  Diabetes Tests:  Parathyroid Panel:  Adrenals:  RADIOLOGY & ADDITIONAL TESTS:    Imaging Personally Reviewed:  [ ] YES  [ ] NO    Consultant(s) Notes Reviewed:  [ ] YES  [ ] NO    Care Discussed with Consultants/Other Providers [ ] YES  [ ] NO

## 2022-02-25 NOTE — PROGRESS NOTE ADULT - SUBJECTIVE AND OBJECTIVE BOX
CHIEF COMPLAINT:  Patient is a 89y old  Female who presents with a chief complaint of hypotension, COVID pneumonia (20 Feb 2022 18:34)  HPI: 89 y/o F w/dementia admitted for hypotension due to severe sepsis with septic shock secondary to COVID-19 PNA and DARREN likely secondary to ATN/sepsis/COVID with hospital course c/b persistent bradyarrythmias causing hypotension requiring continuous dopamine support as well as acute blood loss anemia. No evidence of cardiogenic shock on TTE.   ALLERGIES: No Known Allergies  PAST MEDICAL & SURGICAL HISTORY: HTN (hypertension)  Asthma  Dementia  No significant past surgical history     FAMILY HISTORY: Family history of cancer (Sibling) Cousin  MEDICATIONS  (STANDING): chlorhexidine 2% Cloths 1 Application(s) Topical <User Schedule> DOPamine Infusion 5 MICROgram(s)/kG/Min (8.81 mL/Hr) IV Continuous <Continuous> heparin   Injectable 5000 Unit(s) SubCutaneous every 12 hours  ICU Vital Signs Last 24 Hrs T(C): 36.7 (25 Feb 2022 07:30), Max: 37.1 (24 Feb 2022 12:00) T(F): 98 (25 Feb 2022 07:30), Max: 98.7 (24 Feb 2022 12:00) HR: 76 (25 Feb 2022 08:00) (63 - 116) BP: 104/47 (25 Feb 2022 08:00) (44/29 - 157/66) BP(mean): 60 (25 Feb 2022 08:00) (32 - 88) ABP: -- ABP(mean): -- RR: 15 (25 Feb 2022 08:00) (13 - 21) SpO2: 98% (25 Feb 2022 08:00) (67% - 100%)   Tele: Sinus rhythm and sinus bradycardia.   Eyes: eyelids demonstrated no xanthelasmas.  HEENT: no oral cyanosis.  Neck: normal jugular venous A waves present, normal jugular venous V waves present and no jugular venous bravo A waves.  Pulmonary: no respiratory distress however, diminished breath sounds and scattered coarse rhonchi bilaterally. Cardiovascular: heart rate and rhythm were normal, normal S1 and S2 and no murmur. Abdomen: soft, non-tender Musculoskeletal: the gait could not be assessed.  Extremities: no clubbing of the fingernails, no localized cyanosis, no petechial hemorrhages and no ischemic changes.  Skin: normal skin color and pigmentation, no rash, no venous stasis, no skin lesions, no skin ulcer and no xanthoma was observed.    LABORATORY:                       9.9   11.35 )-----------( 174      ( 25 Feb 2022 02:50 )            29.4                                9.1   11.55 )-----------( 125      ( 24 Feb 2022 04:34 )            27.4   02-25  141  |  110<H>  |  28<H> ----------------------------<  154<H> 3.9   |  26  |  0.87  Ca    8.2<L>      25 Feb 2022 02:50 Phos  2.4     02-25 Mg     1.9     02-25  TPro  5.5<L>  /  Alb  1.9<L>  /  TBili  0.7  /  DBili  x   /  AST  13<L>  /  ALT  22  /  AlkPhos  112  02-25 02-24  142  |  113<H>  |  29<H> ----------------------------<  160<H> 3.6   |  23  |  1.10  Ca    8.1<L>      24 Feb 2022 06:23 Phos  3.2     02-24 Mg     2.0     02-24  TPro  5.6<L>  /  Alb  1.8<L>  /  TBili  0.9  /  DBili  x   /  AST  15  /  ALT  22  /  AlkPhos  123<H>  02-24      IMAGING: < from: 12 Lead ECG (02.17.22 @ 01:41) >  Sinus rhythm premature atrial complexes Abnormal ECG When compared with ECG of 07-MAY-2020 14:04, Nonspecific T wave abnormality now evident in Anterolateral leads  < end of copied text >  < from: US Duplex Venous Upper Ext Complete, Bilateral (02.19.22 @ 19:28) > No evidence of deep venous thrombosis in either upper extremity.  < end of copied text >  < from: NM Hepatobiliary Imaging (02.18.22 @ 18:00) > No scan evidence of acute cholecystitis.  < end of copied text >   < from: TTE Echo Complete w/o Contrast w/ Doppler (02.22.22 @ 09:25) > Summary:  1. Left ventricular ejection fraction, by visual estimation, is 65 to  70%.  2. Normal global left ventricular systolic function.  3. Limited imaging; patient not cooperative and could not be adequately  positioned.  4. Mild thickening and calcification of the anterior and posterior  mitral valve leaflets.  5. Trace mitral valve regurgitation.  6. Moderate tricuspid regurgitation.  7. Estimated pulmonary artery systolic pressure is 40.3 mmHg assuming a  right atrial pressure of 5 mmHg, which is consistent with mild pulmonary  hypertension.  8. Mitral valve mean gradient is 2.0 mmHg consistent with mild mitral  stenosis.   [id number] Miki Rice MD, FACC Electronically signed on 2/22/2022 at 12:56:19 PM  < end of copied text >

## 2022-02-25 NOTE — PROGRESS NOTE ADULT - ASSESSMENT
89 y/o female with h/o dementia.  Admitted to ICU with septic shock secondary to COVID-19 PNA and DARREN likely secondary to ATN/sepsis/COVID.  During her hospital course c/b persistent bradyarrhythmias causing hypotension now on continuous dopamine support as well as acute blood loss anemia.  Anemia present, stable H/H.    Continuing tx for COVID  Continued weaning of Dopamine as per ICU team, no further bradyarrhythmias seen.  Sick euthyroid - endo following.  No indication for PPM at present. 89 y/o female with h/o dementia.  Admitted to ICU with septic shock secondary to COVID-19 PNA and DARREN likely secondary to ATN/sepsis/COVID.  During her hospital course c/b persistent bradyarrhythmias causing hypotension now on continuous dopamine support as well as acute blood loss anemia.  Anemia present, stable H/H.    Continuing tx for COVID  Continued weaning of Dopamine as per ICU team, no further bradyarrhythmias seen (tachycardic at times)  Sick euthyroid - endo following.  No indication for PPM at present. Conservative management if possible. 89 y/o female with h/o dementia.  Admitted to ICU with septic shock secondary to COVID-19 PNA and DARREN likely secondary to ATN/sepsis/COVID.  During her hospital course c/b persistent bradyarrhythmias causing hypotension now on continuous dopamine support as well as acute blood loss anemia.  Anemia present, stable H/H.    Continuing tx for COVID  Continued weaning of Dopamine as per ICU team, no further bradyarrhythmias seen (tachycardic at times)  Sick euthyroid - endo following.  No indication for PPM at present. Conservative management if possible.  Will follow only as needed, please reconsult PRN.

## 2022-02-26 NOTE — PROGRESS NOTE ADULT - SUBJECTIVE AND OBJECTIVE BOX
24 hr events:  HR overall improved with dopamine  hypotensive episodes today  hypothermic  ate 3 spoonfuls of applesauce and a few spoonfuls of ice cream today  more verbal today answering yes/no      ## ROS:  denies fever or chills  denies SOB  denies CP  denies abdominal pain  denies HA or dizziness  "i just want to sleep"  "I'm not hungry"    ## Labs:  CBC:                        9.7    9.74  )-----------( 165      ( 26 Feb 2022 04:50 )             31.1     Chem:  02-26    139  |  110<H>  |  23  ----------------------------<  88  4.3   |  23  |  0.67    Ca    7.8<L>      26 Feb 2022 04:50  Phos  2.4     02-26  Mg     2.3     02-26    TPro  4.9<L>  /  Alb  1.5<L>  /  TBili  0.7  / AST  17  /  ALT  20  /  AlkPhos  90  02-26        ## Medications:    DOPamine Infusion 4.999 MICROgram(s)/kG/Min IV Continuous <Continuous>        heparin   Injectable 5000 Unit(s) SubCutaneous every 12 hours          ## Vitals:  T(C): 35.8 (02-26-22 @ 23:30), Max: 36.6 (02-26-22 @ 03:00)  HR: 87 (02-26-22 @ 23:30) (57 - 102)  BP: 102/54 (02-26-22 @ 23:30) (56/47 - 142/52)  BP(mean): 64 (02-26-22 @ 23:30) (38 - 94)  RR: 21 (02-26-22 @ 23:30) (8 - 21)  SpO2: 95% (02-26-22 @ 23:30) (71% - 99%)        02-25 @ 07:01  -  02-26 @ 07:00  --------------------------------------------------------  IN: 458 mL / OUT: 750 mL / NET: -292 mL    02-26 @ 07:01  - 02-26 @ 23:58  --------------------------------------------------------  IN: 440.5 mL / OUT: 270 mL / NET: 170.5 mL          ## P/E:  Gen: elderly cachectic female, lying comfortably in bed in no apparent distress  HEENT: NC/AT, dry mucus membrane  Resp: CTA B/L  CVS: RRR  Abd: soft NT/ND +BS  Ext: no c/c/e, bilateral UE and LE muscle atrophy  Neuro: follows commands, more alert today and more verbal    CENTRAL LINE: [ ] YES [x ] NO  LOCATION:   DATE INSERTED:  REMOVE: [ ] YES [ ] NO      CABA: [ ] YES [ x] NO    DATE INSERTED:  REMOVE:  [ ] YES [ ] NO      A-LINE:  [ ] YES [ x] NO  LOCATION:   DATE INSERTED:  REMOVE:  [ ] YES [ ] NO  EXPLAIN:    GLOBAL ISSUE/BEST PRACTICE:  Analgesia: n/a  Sedation: n/a  HOB elevation: yes  Stress ulcer prophylaxis: n/a  VTE prophylaxis: heparin sc  Oral Care: n/a  Glycemic control: yes  Nutrition: encouraged po intake    CODE STATUS: [ ] full code  [x ] DNR  [ ] DNI  [ ] MOLST  Goals of care discussion: [x ] yes

## 2022-02-26 NOTE — PROGRESS NOTE ADULT - SUBJECTIVE AND OBJECTIVE BOX
Patient is a 89y old  Female who presents with a chief complaint of hypotension, COVID pneumonia (25 Feb 2022 23:05)      INTERVAL HPI/OVERNIGHT EVENTS:  Pt NAD  no new events    MEDICATIONS  (STANDING):  chlorhexidine 2% Cloths 1 Application(s) Topical <User Schedule>  DOPamine Infusion 4.999 MICROgram(s)/kG/Min (8.81 mL/Hr) IV Continuous <Continuous>  heparin   Injectable 5000 Unit(s) SubCutaneous every 12 hours    MEDICATIONS  (PRN):      REVIEW OF SYSTEMS:  CONSTITUTIONAL: No fever, weight loss, or fatigue  RESPIRATORY: No cough, wheezing, chills or hemoptysis; No shortness of breath  CARDIOVASCULAR: No chest pain, palpitations, dizziness, or leg swelling  GASTROINTESTINAL: No abdominal or epigastric pain. No nausea, vomiting, or hematemesis; No diarrhea or constipation. No melena or hematochezia.  ENDOCRINE: No heat or cold intolerance; No hair loss      Vital Signs Last 24 Hrs  T(C): 35.8 (26 Feb 2022 07:30), Max: 37.3 (25 Feb 2022 19:30)  T(F): 96.5 (26 Feb 2022 07:30), Max: 99.1 (25 Feb 2022 19:30)  HR: 76 (26 Feb 2022 11:30) (70 - 102)  BP: 107/43 (26 Feb 2022 11:30) (80/45 - 142/52)  BP(mean): 58 (26 Feb 2022 11:30) (49 - 71)  RR: 14 (26 Feb 2022 11:30) (12 - 17)  SpO2: 99% (26 Feb 2022 11:30) (71% - 100%)    PHYSICAL EXAM:  GENERAL: NAD, well-groomed, well-developed  CHEST/LUNG: Clear to percussion bilaterally; No rales, rhonchi, wheezing, or rubs  HEART: Regular rate and rhythm; No murmurs, rubs, or gallops        LABS:                        9.7    9.74  )-----------( 165      ( 26 Feb 2022 04:50 )             31.1     02-26    139  |  110<H>  |  23  ----------------------------<  88  4.3   |  23  |  0.67    Ca    7.8<L>      26 Feb 2022 04:50  Phos  2.4     02-26  Mg     2.3     02-26    TPro  4.9<L>  /  Alb  1.5<L>  /  TBili  0.7  /  DBili  x   /  AST  17  /  ALT  20  /  AlkPhos  90  02-26        CAPILLARY BLOOD GLUCOSE        Lipid panel:               RADIOLOGY & ADDITIONAL TESTS:

## 2022-02-26 NOTE — PROGRESS NOTE ADULT - ASSESSMENT
89F PMH asthma (not on meds), HTN (not on meds), dementia (baseline can walk and talk but needs assistance with all ADLs), presents for lethargy and weakness with decreased PO intake over 2-3 days. s/p 1st dose of Pfizer COVID vaccination 2 weeks prior. Hypotensive in ED, lactate 6.8, Cr 1.5, sodium 147. CXR with bibasilar infiltrates. Tested + for COVID. Admitted to ICU for severe sepsis/septic shock, lactic acidosis, DARREN with ATN, COVID PNA, hypernatremia, acute metabolic encephalopathy failure to thrive. Course also with bradycardia HR 30-40s. Found to have low TSH and low T3 T4, sick euthyroid syndrome    NEURO  waxing waning mental status  more responsive today and more alert than prior days      CV  off levophed  off midodrine due to bradycardia  on dopamine : unable to wean off due to hypotension more so than HR  goal to maintain SBP > 90      PULM  COVID: s/p 5 day course of remdesivir  cont on 10 day course of dexamethasone  on RA maintaining O2 sat > 90%    RENAL  renal function improved  hypernatremia resolved: s/p D5 LR     ID  initial blood cx with coag negative staph, likely a contaminant  repeat blood cx negative  initial concerns for biliary source of infection with negative HIDA work up and acute cholecystitis ruled out  completed an empiric 5 day course of zosyn  ENDO  pt with labs showing low TSH but also with low T3 T4  appreciate endo eval: no further work up needed, likely with sick euthyroid syndrome    GEN  DVT prophylaxis: heparin sc  GOC discussion with son Shiv and daughter in law Jo today  Jo remains hopeful that the pt today asked for "applesauce and avocado" which may mean her appetite is better and she will start eating more  overall poor prognosis, failure to thrive , poor nutritional intake preceding hospitalization and during hospitalization d/w son and daughter in law.   at present time family not ready for complete comfort care and would like pt to remain on the dopamine for a "few more weeks" to allow pt time to get better.   will maintain DNR status/DNI  no escalation in care in situation of clinical decline  RAMAN in chart

## 2022-02-26 NOTE — PROGRESS NOTE ADULT - CONVERSATION DETAILS
D/W both Shiv and Jo pt's condition and dependence thus far on dopamine infusion    Realistically not a candidate for PPM if pt overall with failure to thrive and with poor functional status.     Pt was previously made DNR with no escalation of care by Shiv. MOLST was filled out. Since then she has remained on dopamine.     Addressed that if pt remains pressor dependent at some point we will need to shut off and stop the pressor therapy. Addressed that failure to thrive and overall poor appetite without desire to eat is part of the dying process. Daughter in law inquiring about giving pt some oral regimens to take (ie "weed pill") to stimulate her appetite.     AT present time the family is not ready to move forward for full comfort care.
Spoke to Shiv over the phone    Update given to son regarding pt's condition.    Son made aware of pt's cardiac issues with recurrent bradycardia, failure to thrive, metabolic encephalopathy.    Son states that pt's po intake has drastically declined in the last month. At home she was taking spoonfuls of ensure or tapioca.     Goals of care addressed with son and he states that pt does not have a DNR but that the family have talked a little about what to do with her overall advanced age.     D/W son that with bradycardia despite medications there is a risk pt may became bradycardic and asystolic with cardiac arrest. We discussed that with her advanced age, dementia, metabolic encephalopathy, failure to thrive she may not be a candidate for PPM.    Touched upon her decline and failure to thrive as part of dying process.     Son states he will need to speak to rest of family regarding advanced directives.    Son would like to come see patient in person, he plans on coming to visit on Thursday.     At current time no decision made yet by family regarding code status. Full code for now.

## 2022-02-27 NOTE — PROGRESS NOTE ADULT - SUBJECTIVE AND OBJECTIVE BOX
Patient is a 89y old  Female who presents with a chief complaint of hypotension, COVID pneumonia (26 Feb 2022 23:39)      INTERVAL HPI/OVERNIGHT EVENTS:  no events overnight  NAD    MEDICATIONS  (STANDING):  chlorhexidine 2% Cloths 1 Application(s) Topical <User Schedule>  DOPamine Infusion 4.999 MICROgram(s)/kG/Min (8.81 mL/Hr) IV Continuous <Continuous>  heparin   Injectable 5000 Unit(s) SubCutaneous every 12 hours    MEDICATIONS  (PRN):        Vital Signs Last 24 Hrs  T(C): 35.3 (27 Feb 2022 07:22), Max: 35.8 (26 Feb 2022 16:30)  T(F): 95.6 (27 Feb 2022 07:22), Max: 96.5 (26 Feb 2022 16:30)  HR: 99 (27 Feb 2022 09:30) (57 - 100)  BP: 129/65 (27 Feb 2022 09:00) (56/47 - 139/56)  BP(mean): 77 (27 Feb 2022 09:00) (38 - 94)  RR: 13 (27 Feb 2022 09:30) (8 - 21)  SpO2: 94% (27 Feb 2022 09:00) (89% - 99%)    PHYSICAL EXAM:  GENERAL: NAD, well-groomed, well-developed  CHEST/LUNG: no respiratory distress  HEART: Regular rate and rhythm  ABDOMEN: , Nondistended      LABS:                        9.7    9.74  )-----------( 165      ( 26 Feb 2022 04:50 )             31.1     02-26    139  |  110<H>  |  23  ----------------------------<  88  4.3   |  23  |  0.67    Ca    7.8<L>      26 Feb 2022 04:50  Phos  2.4     02-26  Mg     2.3     02-26    TPro  4.9<L>  /  Alb  1.5<L>  /  TBili  0.7  /  DBili  x   /  AST  17  /  ALT  20  /  AlkPhos  90  02-26        CAPILLARY BLOOD GLUCOSE        Lipid panel:               RADIOLOGY & ADDITIONAL TESTS:

## 2022-02-27 NOTE — PROGRESS NOTE ADULT - ASSESSMENT
89F PMH asthma (not on meds), HTN (not on meds), dementia (baseline can walk and talk but needs assistance with all ADLs), presents for lethargy and weakness with decreased PO intake over 2-3 days. s/p 1st dose of Pfizer COVID vaccination 2 weeks prior. Hypotensive in ED, lactate 6.8, Cr 1.5, sodium 147. CXR with bibasilar infiltrates. Tested + for COVID. Admitted to ICU for severe sepsis/septic shock, lactic acidosis, DARREN with ATN, COVID PNA, hypernatremia, acute metabolic encephalopathy failure to thrive. Course also with bradycardia HR 30-40s started on dopamine. Found to have low TSH and low T3 T4, sick euthyroid syndrome    NEURO  waxing waning mental status  responsive today and alert but confused      CV  off levophed  off midodrine due to bradycardia  remains on dopamine : unable to wean off due to hypotension, lowest HR in the 50s briefly today  goal to maintain SBP > 90      PULM  COVID: s/p 5 day course of remdesivir  cont on 10 day course of dexamethasone  on RA maintaining O2 sat > 90%    RENAL  renal function improved  hypernatremia resolved: s/p D5 LR   supplement hypophosphatemia    ID  initial blood cx with coag negative staph, likely a contaminant  repeat blood cx negative  completed an empiric 5 day course of zosyn    ENDO  pt with labs showing low TSH but also with low T3 T4  appreciate endo eval: no further work up needed, likely with sick euthyroid syndrome    GEN  DVT prophylaxis: heparin sc  DNR/DNI  no escalation in care in situation of clinical decline  MOLST in chart  GOC with family yesterday: they remain hopeful that pt will start eating by herself and will get better. Attempted to explain that pt is pressor dependent and has not been able to be weaned off and that IV pressors is not a long term solution. Explained pt is in her dying process with failure to thrive. Family state as long as she can eat they would not want a feeding tube and ideally would like maybe a  "weed pill" to help her appetite an to give pt some time to improve.

## 2022-02-27 NOTE — PROGRESS NOTE ADULT - PROBLEM SELECTOR PLAN 1
Check thyroid function tests in AM   expect no improvement   no thyroid treatment required   supportive care   prognosis guarded
no treatment for now   Check thyroid function tests once better clinically
repeat tsh improving (0.208)  no indication for tx at this time  repeat tfts as outpt within 6-8wks
repeat tsh improving (0.208)  no indication for tx at this time  will continue to trend while inpt  could check reverse T3 to confirm dx  repeat tfts as outpt within 6-8wks
No treatment is required currently.  Check thyroid function tests in a few days when better clinically.  The recovery of TSH may actually increase it almost up to 20 before normalizing again
no treatment for now   Check thyroid function tests once better Towards sick thyroid syndrome. test for rule out sick thyroid syndrome is reverse T3 which is not done in the [facility] lab system.  Total T3 is also very low and indicates sick thyroid syndrome.   No treatment is required and repeat thyroid function tests once the patient is clinically better

## 2022-02-27 NOTE — PROGRESS NOTE ADULT - PROBLEM SELECTOR PROBLEM 1
Sick-euthyroid syndrome

## 2022-02-27 NOTE — PROGRESS NOTE ADULT - SUBJECTIVE AND OBJECTIVE BOX
24 hr events:  still remains with poor appetite  per nursing still spitting out food rom mouth when fed  taking in few spoonfuls of applesauce  remains on dopamine weaned from 11mcg -> 9mcg  still with intermittent hypothermia    ## ROS:  no fever, no chills  no HA  no SOB  no CP  no abdominal pain  "I don't want to eat"      ## Labs:  CBC:                        9.7    9.74  )-----------( 165      ( 26 Feb 2022 04:50 )             31.1     Chem:  02-26    139  |  110<H>  |  23  ----------------------------<  88  4.3   |  23  |  0.67    Ca    7.8<L>      26 Feb 2022 04:50  Phos  2.4     02-26  Mg     2.3     02-26    TPro  4.9<L>  /  Alb  1.5<L>  /  TBili  0.7  /  DBili  x   /  AST  17  /  ALT  20  /  AlkPhos  90  02-26        ## Medications:    DOPamine Infusion 4.999 MICROgram(s)/kG/Min IV Continuous <Continuous>      heparin   Injectable 5000 Unit(s) SubCutaneous every 12 hours      ## Vitals:  T(C): 35.4 (02-27-22 @ 23:00), Max: 35.5 (02-27-22 @ 05:00)  HR: 94 (02-27-22 @ 23:30) (57 - 99)  BP: 82/65 (02-27-22 @ 23:30) (82/65 - 138/65)  BP(mean): 68 (02-27-22 @ 23:30) (55 - 107)  RR: 18 (02-27-22 @ 23:30) (9 - 21)  SpO2: 94% (02-27-22 @ 23:30) (88% - 100%)        02-26 @ 07:01  -  02-27 @ 07:00  --------------------------------------------------------  IN: 614.2 mL / OUT: 445 mL / NET: 169.2 mL    02-27 @ 07:01 - 02-28 @ 00:51  --------------------------------------------------------  IN: 518.8 mL / OUT: 235 mL / NET: 283.8 mL          ## P/E:  Gen: elderly female, lying comfortably in bed in no apparent distress  HEENT: NC/AT  Resp: CTA B/L , no wheeze, no rales  CVS: RRR  Abd: soft NT/ND +BS  Ext: no c/c/e  Neuro: awake, answering simple questions, follows simple commands    CENTRAL LINE: [ ] YES [x ] NO  LOCATION:   DATE INSERTED:  REMOVE: [ ] YES [ ] NO      CABA: [ ] YES [x ] NO    DATE INSERTED:  REMOVE:  [ ] YES [ ] NO      A-LINE:  [ ] YES [x ] NO  LOCATION:   DATE INSERTED:  REMOVE:  [ ] YES [ ] NO  EXPLAIN:    GLOBAL ISSUE/BEST PRACTICE:  Analgesia: n/a  Sedation: n/a  HOB elevation: yes  Stress ulcer prophylaxis: n/a  VTE prophylaxis: heparin sc  Oral Care: n/a  Glycemic control: n/a  Nutrition: po diet    CODE STATUS: [ ] full code  [ x] DNR  [ ] DNI  [x ] RAMAN  Goals of care discussion: [ x] yes

## 2022-02-28 NOTE — PHYSICAL THERAPY INITIAL EVALUATION ADULT - ADDITIONAL COMMENTS
Per Son Shiv at [phone], patient lives in a house with 5 steps to enter. Once inside, patient has no more steps to negotiate. Prior to hospitalization, patient required 1 person assistance for stair negotiation, mostly housebound. Per patient's son, she was able to ambulate in the home for up to 15 feet with 1 person assistance (no DME), has been getting weaker for the past ~2-3 months. Patient has a regular bed, rolling walker and transport wheelchair, does not have a home health aide. Per son, patient prefers to posture herself in R sidelying during sleep.

## 2022-02-28 NOTE — PHYSICAL THERAPY INITIAL EVALUATION ADULT - GENERAL OBSERVATIONS, REHAB EVAL
Patient encountered supine in bed, +bairhugger, +cardiac monitor, +IV, +continuous pulse ox, +bautista, A&O x 0, in no distress.

## 2022-02-28 NOTE — CONSULT NOTE ADULT - PROBLEM SELECTOR RECOMMENDATION 8
Spoke with ICU attending and patient is pending eval for PPM. Called patient's son, Shiv [phone] to set up a family meeting with the ICU team after PPM eval is done.  Shiv said he and his wife would want to be present and possibly his brother, Luis Manuel.  Shiv will call back to confirm meeting time and date.  Patient has DNR on MOLST in chart.      Discussed with Dr. Bhatt

## 2022-02-28 NOTE — PHYSICAL THERAPY INITIAL EVALUATION ADULT - PERTINENT HX OF CURRENT PROBLEM, REHAB EVAL
Patient admitted with lethargic, weakness and FTT x 2-3 days. Patient found to have septic shock and covid PNA, now dependent on dopamine for HR and BP management.

## 2022-02-28 NOTE — CONSULT NOTE ADULT - PROBLEM SELECTOR RECOMMENDATION 3
s/p remdesevir and decadron  on room air  received one Pfizer shot for COVID 2 weeks prior to admission

## 2022-02-28 NOTE — PHYSICAL THERAPY INITIAL EVALUATION ADULT - BED MOBILITY TRAINING, PT EVAL
Pt will perform all aspects of bed mobility with minimal assistance x 1 to help prevent pressure ulcers, by 3-4 weeks.

## 2022-02-28 NOTE — CHART NOTE - NSCHARTNOTEFT_GEN_A_CORE
Assessment:  Pt seen in CCU, on COVID-19 isolation.  Pt adm c induced hypothermia, AMS, sepsis, pneumonia, COVID, DARREN, acute blood loss anemia, bradycardia, functional quadriplegia, sick-euthyroid syndrome. Palliative consult noted, pt is DNR, DNI  PMH include dementia, asthma, HTN.    Factors impacting intake: [ ] none [ ] nausea  [ ] vomiting [ ] diarrhea [ ] constipation  [ ]chewing problems [x ] swallowing issues; 02/23, pt refused trials for swallow evaluation   [x ] other: acute illness    Diet Prescription: Diet, Pureed:   No Liquids (NOLIQUIDS) (02-20-22 @ 14:36)    Intake: <25%, taking pudding w/o difficulty as per Nursing Assistant     Current Weight: 02/28, 55.3 kg, 02/17, 47.1 kg, c wt. gain of 8.2 kg  % Weight Change: 17.4%  02/28, 2+ edema of right arm noted      Pertinent Medications: MEDICATIONS  (STANDING):  chlorhexidine 2% Cloths 1 Application(s) Topical <User Schedule>  DOPamine Infusion 4.999 MICROgram(s)/kG/Min (8.81 mL/Hr) IV Continuous <Continuous>  heparin   Injectable 5000 Unit(s) SubCutaneous every 12 hours    MEDICATIONS  (PRN):    Pertinent Labs: 02-28 Na137 mmol/L Glu 83 mg/dL K+ 3.9 mmol/L Cr  0.53 mg/dL BUN 19 mg/dL 02-28 Phos 2.9 mg/dL 02-28 Alb 1.5 g/dL<L>02-28 ALT 17 U/L AST 17 U/L Alkaline Phosphatase 79 U/L   CAPILLARY BLOOD GLUCOSE        Skin:   Skin tear; right arm noted     Estimated Needs:   [ x] no change since previous assessment (02/19)  [ ] recalculated:     Previous Nutrition Diagnosis:   Malnutrition Severe malnutrition in the context of acute illness.     Etiology Inadequate protein-energy intake in setting of COVID PNA, septic shock, acute cholecystitis.     Signs/Symptoms 3+ edema; pt meeting <50% estimated energy needs >5 days.     Goal/Expected Outcome Once advanced pt to meet >50% estimated nutrient needs via tolerated route; not applicable  New Goal: nutrition/hydration as preferred/tolerated     Nutrition Diagnosis is [x ] ongoing  [ ] resolved [ ] not applicable       New Nutrition Diagnosis: [x ] not applicable     Interventions:   Recommend  [ ] Change Diet To:  [ ] Nutrition Supplement  [ ] Nutrition Support  [x ] Other: swallow re-evaluation requested & ordered     Monitoring and Evaluation:   [x ] PO intake [ x ] Tolerance to diet prescription [ x ] weights [ x ] labs[ x ] follow up per protocol  [ ] other: Assessment:  Pt seen in CCU, on COVID-19 isolation.  Pt adm c induced hypothermia, AMS, sepsis, pneumonia, COVID, DARREN, acute blood loss anemia, bradycardia, functional quadriplegia, sick-euthyroid syndrome. Palliative consult noted, pt is DNR, DNI  PMH include dementia, asthma, HTN.    Factors impacting intake: [ ] none [ ] nausea  [ ] vomiting [ ] diarrhea [ ] constipation  [ ]chewing problems [x ] swallowing issues; 02/23, pt refused trials for swallow evaluation   [x ] other: acute illness    Diet Prescription: Diet, Pureed:   No Liquids (NOLIQUIDS) (02-20-22 @ 14:36)    Intake: <25%, taking pudding w/o difficulty as per Nursing Assistant     Current Weight: 02/28, 55.3 kg, 02/17, 47.1 kg, c wt. gain of 8.2 kg  % Weight Change: 17.4%  02/28, 2+ edema of right arm noted      Pertinent Medications: MEDICATIONS  (STANDING):  chlorhexidine 2% Cloths 1 Application(s) Topical <User Schedule>  DOPamine Infusion 4.999 MICROgram(s)/kG/Min (8.81 mL/Hr) IV Continuous <Continuous>  heparin   Injectable 5000 Unit(s) SubCutaneous every 12 hours    MEDICATIONS  (PRN):    Pertinent Labs: 02-28 Na137 mmol/L Glu 83 mg/dL K+ 3.9 mmol/L Cr  0.53 mg/dL BUN 19 mg/dL 02-28 Phos 2.9 mg/dL 02-28 Alb 1.5 g/dL<L>02-28 ALT 17 U/L AST 17 U/L Alkaline Phosphatase 79 U/L   CAPILLARY BLOOD GLUCOSE        Skin:   Skin tear; right arm noted     Estimated Needs:   [ x] no change since previous assessment (02/19)  [ ] recalculated:     Previous Nutrition Diagnosis:   Malnutrition Severe malnutrition in the context of acute illness.     Etiology Inadequate protein-energy intake in setting of COVID PNA, septic shock, acute cholecystitis.     Signs/Symptoms 3+ edema; pt meeting <50% estimated energy needs >5 days.     Goal/Expected Outcome Once advanced pt to meet >50% estimated nutrient needs via tolerated route; not applicable  New Goal: nutrition/hydration as preferred/tolerated     Nutrition Diagnosis is [x ] ongoing  [ ] resolved [ ] not applicable       New Nutrition Diagnosis: [x ] not applicable     Interventions:   Recommend  [ ] Change Diet To:  [ ] Nutrition Supplement  [X ] Nutrition Support; will add 2 pudding c each meal at present  [x ] Other: swallow re-evaluation requested & ordered     Monitoring and Evaluation:   [x ] PO intake [ x ] Tolerance to diet prescription [ x ] weights [ x ] labs[ x ] follow up per protocol  [ ] other:

## 2022-02-28 NOTE — PHYSICAL THERAPY INITIAL EVALUATION ADULT - FUNCTIONAL LIMITATIONS, PT EVAL
Normal rate, regular rhythm.  Heart sounds S1, S2.  No murmurs, rubs or gallops. 4++ b/l edema at lower extremity self-care/home management

## 2022-02-28 NOTE — CONSULT NOTE ADULT - ASSESSMENT
89 YO female with PMH asthma (not on meds), HTN (not on meds), dementia (baseline is that pt can walk and talk but needs assistance with all ADLs), presents for lethargy, weakness, FTT over the last 2-3 days. Pt received first dose Pfizer 2 weeks ago.  In the ED, pt found to be COVID positive with labs significant for lactate 6.8, creatinine 1.5, sodium 147.  CT head negative for acute pathology.  CXR with patchy opacity of right lung.  Pt was given 1.5L of IVF and remained hypotensive peripheral levophed started.  Admit to ICU for septic shock 2/2 COVID pneumonia  asked to evaluate for possible tocilizumab. She came in and documentation shows 100% on room air then she was placed on NRB but since then she had been weaned to venti mask.  Her platelets are low and close to the threshold of 50k   she had a very high lactate and there may be a develop bacterial process which would exclude her from receiving tocilizumab     COVID  ARF   functional quadriplegia   high serum lactate    Plan:  blood cultures   hold antibiotics   urine culture      #COVID  Monitor clinically.  Monitor Oxygenation  O2 supplementation.  Remdesivir - up to 5 days depending on clinical course.  Monitor CBC, CMP daily  Ferritin, CRP, and D dimer q 48 hrs.  IV Dexamethasone 6mg q24hrs x10 days if hypoxia.  Anticoagulation per protocol.  Treatment options are limited  Monitor for any bacterial superinfection/complications.  This tx plan was formulated utilizing my clinical judgement, currently available local/regional anecdotal information, organizational treatment recommendations with COVID-19 specific considerations given rapidly changing information available.   not a candidate for tocilizumab at this kal     #Acute Respiratory Failure  wean oxygen as tolerated  chest PT  routine PT  OOB to chair if able   incentive spirometer   decadron 6mg q24hrs for 10 days  Inhalers-MDI and avoid nebulizers except in negative pressure room     #high serum lactate   trend lactate  pressors per icu   hold antibiotics as likely just related to covid     D/W Kimberlyn Resendez, DO  Infectious Disease Attending  Reachable via Microsoft Teams or ID office: [phone]  After 5pm/weekends please call [phone] for all inquiries and new consults     
low TSH 
89 year old female PMH of asthma, HTN and dementia presented to hospital presented with weakness and lethargy x 2 days and found to be COVID positive in the ED.  Patient admitted to ICU for septic shock requiring pressors.  She has had episodes of bradycardia with rates in the 30-40s and remains on dopamine.  Palliative care consulted for GOC.

## 2022-02-28 NOTE — PHYSICAL THERAPY INITIAL EVALUATION ADULT - ACTIVE RANGE OF MOTION EXAMINATION, REHAB EVAL
patient noted to preferentially posture herself into knee/hip flexion and L hip internal rotation, B knees grossly 8-100/deficits as listed below

## 2022-02-28 NOTE — CONSULT NOTE ADULT - REASON FOR ADMISSION
hypotension, COVID pneumonia

## 2022-02-28 NOTE — CONSULT NOTE ADULT - TIME-BASED
[facility]     Follow-up     Patient was personally examined by this writer via telehealth.  The patient was at [facility].  This writer was working remotely.     Time spent with patient: 15-minute follow-up     Case discussed with Dr. Sinclair       SUBJECTIVE:    This is follow-up on a 73-year-old female initially consulted on 4/14/2021.  Follow-up on 4/16/21 by this writer.  At that time patient had endorsed suicidal ideation to her nurse and vaguely indicated to this writer.  Continued one-to-one sitter and suicide precautions.  On follow-up examination patient is lying in bed dressed in hospital gown.  One-to-one sitter at bedside.  Patient's daughter was at bedside and was just about to leave.  Spoke briefly with daughter who states patient has not made any concerning comments to her.  She notes that mother is still mildly confused but improving.  Spoke with treating RN who states patient has not made any suicidal remarks.  On exam patient appears more alert than on last follow-up but still with confusion.  She is awake- thoughts are concrete.  She thought it was February 2023.  She could not name the current president.  She knew she was in the hospital.  States that her mood is \"down because of all the people with COVID\".  However she denies suicidal ideation or plan.  States that she has 2 daughters and 2 cats.  States she likes to enjoy a simple life.  Has no plans on harming herself.   She is aware that the plan is for [facility] and she had discussed that with her daughter.  She does understand that she will need therapy as she has been weak.  Denies hallucinations.  No delusions verbalized.      Visit Vitals  /68   Pulse 65   Temp 97.9 °F (36.6 °C) (Oral)   Resp 16   Ht 5' 3\" (1.6 m)   Wt 91.9 kg (202 lb 9.6 oz)   SpO2 95%   BMI 35.89 kg/m²        Current Facility-Administered Medications   Medication Dose Route Frequency Provider Last Rate Last Admin   •  dextrose 50 % injection 25 g  25 g Intravenous PRN Clementine Millard MD       • dextrose 50 % injection 12.5 g  12.5 g Intravenous PRN Clementine Millard MD       • glucagon (GLUCAGEN) injection 1 mg  1 mg Intramuscular PRN Clementine Millard MD       • dextrose (GLUTOSE) 40 % gel 15 g  15 g Oral PRN Clementine Millard MD       • dextrose (GLUTOSE) 40 % gel 30 g  30 g Oral PRN Clementine Millard MD       • insulin lispro (ADMELOG,HumaLOG) scheduled AND correction dose   Subcutaneous 4x Daily AC & HS Clementine Millard MD       • potassium CHLORIDE (KLOR-CON M) pernell ER tablet 20 mEq  20 mEq Oral Daily with breakfast Clementine Millard MD   20 mEq at 04/19/21 1203   • cephalexin (KEFLEX) capsule 500 mg  500 mg Oral 3 times per day Clementine Millard MD   500 mg at 04/19/21 1452   • diphenhydrAMINE (BENADRYL) capsule 25 mg  25 mg Oral Nightly PRN Clementine Millard MD   25 mg at 04/17/21 2204   • calcium carbonate (TUMS) chewable tablet 500 mg  500 mg Oral Q4H PRN Clementine Millard MD       • buPROPion (WELLBUTRIN) tablet 75 mg  75 mg Oral BID Clementine Millard MD   75 mg at 04/19/21 0934   • metoPROLOL tartrate (LOPRESSOR) tablet 25 mg  25 mg Oral 2 times per day HOMERO Hernandes   25 mg at 04/19/21 0934   • acetaminophen (TYLENOL) tablet 650 mg  650 mg Oral Q4H PRN Clementine Millard MD   650 mg at 04/18/21 0933   • thiamine (VITAMIN B1) tablet 100 mg  100 mg Oral Daily Clementine Millard MD   100 mg at 04/19/21 0933   • cyanocobalamin (Vitamin B-12) tablet 250 mcg  250 mcg Oral Daily Clementine Millard MD   250 mcg at 04/19/21 0933   • folic acid (FOLATE) tablet 1 mg  1 mg Oral Daily Clementine Millard MD   1 mg at 04/19/21 0934   • venlafaxine XR (EFFEXOR XR) 24 hr capsule 150 mg  150 mg Oral Daily with breakfast Clementine Millard MD   150 mg at 04/19/21 0934   • ondansetron (ZOFRAN) injection 4 mg  4 mg Intravenous Q6H PRN Clementine  MD [place]       • levothyroxine (SYNTHROID, LEVOTHROID) tablet 100 mcg  100 mcg Oral QAM Clementine Millard MD   100 mcg at 04/19/21 0620   • amLODIPine (NORVASC) tablet 10 mg  10 mg Oral Daily Clementine Millard MD   10 mg at 04/19/21 0934   • pantoprazole (PROTONIX) EC tablet 40 mg  40 mg Oral QAM [facility]   40 mg at 04/19/21 0620   • losartan (COZAAR) tablet 100 mg  100 mg Oral QAM [facility]   100 mg at 04/19/21 0619   • enoxaparin (LOVENOX) injection 40 mg  40 mg Subcutaneous Q24H [facility]   40 mg at 04/19/21 0933   • hydrochlorothiazide (HYDRODIURIL) tablet 25 mg  25 mg Oral Daily [facility]   25 mg at 04/19/21 0934   • metoPROLOL (LOPRESSOR) injection 5 mg  5 mg Intravenous Q6H PRN [place] Faisal, DO   5 mg at 04/14/21 0859   • sodium chloride 0.9 % flush bag 25 mL  25 mL Intravenous PRN Moi Mari MD       • Potassium Standard Replacement Protocol   Does not apply See Admin Instructions Moi Mari MD       • LORazepam (ATIVAN) injection 1 mg  1 mg Intravenous Q2H PRN Moi Mari MD            Past Medical History:   Diagnosis Date   • Arthritis    • Depression    • Diabetes mellitus (CMS/HCC)    • Essential (primary) hypertension    • Gastroesophageal reflux disease    • Osteoporosis    • Overactive bladder    • Thyroid condition         Labs       MENTAL STATUS EXAM:    Received lying in bed dressed in hospital gown.  No agitation noted.  Appears documented age.  Makes eye contact throughout.  Speech is clear, impoverished.  Provides  yes/no answers.  Mood: \"Sad about this whole situation.  Said about everybody that has COVID\".  Affect: Restricted.  Thoughts are concrete.  Mildly confused.  Thought content: Denies suicidal ideation or plan.  Denies homicidal ideation.  Denies hallucinations.  No delusions verbalized.  Judgment is poor to fair.  Insight is fair.  \"I probably need to go over that place for physical therapy\"  Musculoskeletal no involuntary  movements, bedrest gait not tested      DIAGNOSIS:     Delirium     Major depressive disorder by history      RECOMMENDATIONS:    This is follow-up on a 73-year-old female.  On follow-up exam patient denies suicidal ideation or plan.  Her daughter was visiting her during this examination.  Daughter states that she is slowly improving in her opinion.  Still with some confusion.  No further suicidal comments to daughter, nursing staff or this writer.  Plan is for transfer to [facility] once medically clear and cleared by psychiatry.     -Patient does not meet criteria for inpatient psychiatric hospitalization  -Discontinue suicide precautions  -Discontinue one-to-one sitter  -May continue Wellbutrin and Effexor as currently ordered.  -Facility psychiatrist to manage psychotropic medications post discharge.  -May be discharged to facility from psychiatric standpoint  -Patient will benefit from frequent reorientation, minimization of nighttime interruptions and maintenance of the sleep-wake cycle.  -Continue safety and fall precautions  -[facility] is available for collaboration of care and available via perfect serve       70

## 2022-02-28 NOTE — PROGRESS NOTE ADULT - ASSESSMENT
89 y/o F w/dementia admitted for hypotension due to severe sepsis with septic shock secondary to COVID-19 PNA and DARREN likely secondary to ATN/sepsis/COVID with hospital course c/b persistent bradyarrythmias causing hypotension requiring continuous dopamine support as well as acute blood loss anemia.    - Continue dopamine, wean as tolerated  - Completed dexamethasone, remdesivir  - Trend CBC, transfuse PRN for hgb < 7  - Trend Cr, avoid nephrotoxins  - DVT prophylaxis  - DNR  - Guarded prognosis    I have personally provided 35 minutes of attending critical care time excluding procedures. 89 y/o F w/dementia admitted for hypotension due to severe sepsis with septic shock secondary to COVID-19 PNA and DARREN likely secondary to ATN/sepsis/COVID with hospital course c/b persistent bradyarrythmias causing hypotension requiring continuous dopamine support as well as acute blood loss anemia.    - Continue dopamine, wean as tolerated  - Will discuss with cardiology about possible pacemaker as patient unable to come off dopamine  - Completed dexamethasone, remdesivir  - Trend CBC, transfuse PRN for hgb < 7  - Trend Cr, avoid nephrotoxins  - DVT prophylaxis  - DNR  - Guarded prognosis    I have personally provided 35 minutes of attending critical care time excluding procedures.

## 2022-02-28 NOTE — CONSULT NOTE ADULT - PROBLEM SELECTOR RECOMMENDATION 6
per chart review patient is able to walk and talk but needs assistance with ADLs  patient alert and asking to eat, does not answer all questions appropriately  may be a component of encephalopathy  supportive care

## 2022-02-28 NOTE — PROGRESS NOTE ADULT - SUBJECTIVE AND OBJECTIVE BOX
HPI:  89 YO female with PMH asthma (not on meds), HTN (not on meds), dementia (baseline is that pt can walk and talk but needs assistance with all ADLs), presents for lethargy, weakness, FTT over the last 2-3 days. Pt received first dose Pfizer 2 weeks ago.  In the ED, pt found to be COVID positive with labs significant for lactate 6.8, creatinine 1.5, sodium 147.  CT head negative for acute pathology.  CXR with patchy opacity of right lung.  Pt was given 1.5L of IVF and remained hypotensive peripheral levophed started.    Admit to ICU for septic shock 2/2 COVID pneumonia. (17 Feb 2022 04:24)      24 hr events: No acute events. Feels well this morning. No chest pain, dyspnea, fevers, chills, nausea, emesis, diarrhea, or dizziness    ## ROS:  See above. ROS otherwise negative.    ## Vitals  ICU Vital Signs Last 24 Hrs  T(C): 36.5 (28 Feb 2022 07:21), Max: 36.5 (28 Feb 2022 07:21)  T(F): 97.7 (28 Feb 2022 07:21), Max: 97.7 (28 Feb 2022 07:21)  HR: 88 (28 Feb 2022 08:00) (57 - 100)  BP: 84/63 (28 Feb 2022 08:00) (73/33 - 135/54)  BP(mean): 67 (28 Feb 2022 08:00) (42 - 107)  ABP: --  ABP(mean): --  RR: 20 (28 Feb 2022 08:00) (10 - 22)  SpO2: 93% (28 Feb 2022 08:00) (88% - 100%)      ## Physical Exam:  Gen: Elderly female lying comfortably in bed, NAD  HEENT: NC/AT sclerae anicteric  Resp: No increased WOB, CTAB  CV: S1, S2  Abd: Soft, + BS  Ext: WWP  Neuro: Awake, alert follows commands. Moves all extremities    ## Vent Data      ## Labs:  Chem:  02-28    137  |  108  |  19  ----------------------------<  83  3.9   |  24  |  0.53    Ca    7.7<L>      28 Feb 2022 05:06  Phos  2.9     02-28  Mg     2.3     02-28    TPro  4.6<L>  /  Alb  1.5<L>  /  TBili  0.7  /  DBili  x   /  AST  17  /  ALT  17  /  AlkPhos  79  02-28    LIVER FUNCTIONS - ( 28 Feb 2022 05:06 )  Alb: 1.5 g/dL / Pro: 4.6 gm/dL / ALK PHOS: 79 U/L / ALT: 17 U/L / AST: 17 U/L / GGT: x           CBC:                        7.8    8.63  )-----------( 189      ( 28 Feb 2022 05:06 )             23.0     Coags:          ## Cardiac        ## Blood Gas      #I/Os  I&O's Detail    27 Feb 2022 07:01  -  28 Feb 2022 07:00  --------------------------------------------------------  IN:    DOPamine Infusion: 364.2 mL    IV PiggyBack: 250 mL  Total IN: 614.2 mL    OUT:    Indwelling Catheter - Urethral (mL): 435 mL  Total OUT: 435 mL    Total NET: 179.2 mL      28 Feb 2022 07:01  -  28 Feb 2022 08:57  --------------------------------------------------------  IN:    DOPamine Infusion: 15.9 mL  Total IN: 15.9 mL    OUT:  Total OUT: 0 mL    Total NET: 15.9 mL          ## Imaging:    ## Medications:  MEDICATIONS  (STANDING):  chlorhexidine 2% Cloths 1 Application(s) Topical <User Schedule>  DOPamine Infusion 4.999 MICROgram(s)/kG/Min (8.81 mL/Hr) IV Continuous <Continuous>  heparin   Injectable 5000 Unit(s) SubCutaneous every 12 hours    MEDICATIONS  (PRN):       HPI:  89 YO female with PMH asthma (not on meds), HTN (not on meds), dementia (baseline is that pt can walk and talk but needs assistance with all ADLs), presents for lethargy, weakness, FTT over the last 2-3 days. Pt received first dose Pfizer 2 weeks ago.  In the ED, pt found to be COVID positive with labs significant for lactate 6.8, creatinine 1.5, sodium 147.  CT head negative for acute pathology.  CXR with patchy opacity of right lung.  Pt was given 1.5L of IVF and remained hypotensive peripheral levophed started.    Admit to ICU for septic shock 2/2 COVID pneumonia. (17 Feb 2022 04:24)      24 hr events: No acute events. Feels well this morning. States she is hungry. No chest pain, dyspnea, fevers, chills, nausea, emesis, diarrhea, or dizziness    ## ROS:  See above. ROS otherwise negative.    ## Vitals  ICU Vital Signs Last 24 Hrs  T(C): 36.5 (28 Feb 2022 07:21), Max: 36.5 (28 Feb 2022 07:21)  T(F): 97.7 (28 Feb 2022 07:21), Max: 97.7 (28 Feb 2022 07:21)  HR: 88 (28 Feb 2022 08:00) (57 - 100)  BP: 84/63 (28 Feb 2022 08:00) (73/33 - 135/54)  BP(mean): 67 (28 Feb 2022 08:00) (42 - 107)  ABP: --  ABP(mean): --  RR: 20 (28 Feb 2022 08:00) (10 - 22)  SpO2: 93% (28 Feb 2022 08:00) (88% - 100%)      ## Physical Exam:  Gen: Elderly female lying comfortably in bed, NAD  HEENT: NC/AT sclerae anicteric  Resp: No increased WOB, CTAB  CV: S1, S2  Abd: Soft, + BS  Ext: WWP  Neuro: Awake, alert follows commands. Moves all extremities    ## Vent Data      ## Labs:  Chem:  02-28    137  |  108  |  19  ----------------------------<  83  3.9   |  24  |  0.53    Ca    7.7<L>      28 Feb 2022 05:06  Phos  2.9     02-28  Mg     2.3     02-28    TPro  4.6<L>  /  Alb  1.5<L>  /  TBili  0.7  /  DBili  x   /  AST  17  /  ALT  17  /  AlkPhos  79  02-28    LIVER FUNCTIONS - ( 28 Feb 2022 05:06 )  Alb: 1.5 g/dL / Pro: 4.6 gm/dL / ALK PHOS: 79 U/L / ALT: 17 U/L / AST: 17 U/L / GGT: x           CBC:                        7.8    8.63  )-----------( 189      ( 28 Feb 2022 05:06 )             23.0     Coags:          ## Cardiac        ## Blood Gas      #I/Os  I&O's Detail    27 Feb 2022 07:01  -  28 Feb 2022 07:00  --------------------------------------------------------  IN:    DOPamine Infusion: 364.2 mL    IV PiggyBack: 250 mL  Total IN: 614.2 mL    OUT:    Indwelling Catheter - Urethral (mL): 435 mL  Total OUT: 435 mL    Total NET: 179.2 mL      28 Feb 2022 07:01  -  28 Feb 2022 08:57  --------------------------------------------------------  IN:    DOPamine Infusion: 15.9 mL  Total IN: 15.9 mL    OUT:  Total OUT: 0 mL    Total NET: 15.9 mL          ## Imaging:    ## Medications:  MEDICATIONS  (STANDING):  chlorhexidine 2% Cloths 1 Application(s) Topical <User Schedule>  DOPamine Infusion 4.999 MICROgram(s)/kG/Min (8.81 mL/Hr) IV Continuous <Continuous>  heparin   Injectable 5000 Unit(s) SubCutaneous every 12 hours    MEDICATIONS  (PRN):

## 2022-02-28 NOTE — CONSULT NOTE ADULT - SUBJECTIVE AND OBJECTIVE BOX
HPI:  89 YO female with PMH asthma (not on meds), HTN (not on meds), dementia (baseline is that pt can walk and talk but needs assistance with all ADLs), presents for lethargy, weakness, FTT over the last 2-3 days. Pt received first dose Pfizer 2 weeks ago.  In the ED, pt found to be COVID positive with labs significant for lactate 6.8, creatinine 1.5, sodium 147.  CT head negative for acute pathology.  CXR with patchy opacity of right lung.  Pt was given 1.5L of IVF and remained hypotensive peripheral levophed started.    Admit to ICU for septic shock 2/2 COVID pneumonia. (2022 04:24)    PERTINENT PM/SXH:   No pertinent past medical history    HTN (hypertension)    Asthma    Dementia      No significant past surgical history      FAMILY HISTORY:  Family history of cancer (Sibling)  Cousin      ITEMS NOT CHECKED ARE NOT PRESENT    SOCIAL HISTORY:   Significant other/partner:  [ ]  Children: yes [ ]  Yarsanism/Spirituality: Sikhism  Substance hx:  [ ]   Tobacco hx:  [ ]   Alcohol hx: [ ]   Home Opioid hx:  [ ] I-Stop Reference No: Reference #: [id number]  Living Situation: [ ]Home  [ ]Long term care  [ ]Rehab [ ]Other    ADVANCE DIRECTIVES:    DNR  MOLST  [ ]  Living Will  [ ]   DECISION MAKER(s):  [ ] Health Care Proxy(s)  [ x] Surrogate(s)  [ ] Guardian           Name(s): Phone Number(s):Leilani ([facility] [phone]    BASELINE (I)ADL(s) (prior to admission):  [place]: [ ]Total  [ ] Moderate [ ]Dependent    Allergies    No Known Allergies    Intolerances    MEDICATIONS  (STANDING):  chlorhexidine 2% Cloths 1 Application(s) Topical <User Schedule>  DOPamine Infusion 4.999 MICROgram(s)/kG/Min (8.81 mL/Hr) IV Continuous <Continuous>  heparin   Injectable 5000 Unit(s) SubCutaneous every 12 hours    MEDICATIONS  (PRN):    PRESENT SYMPTOMS: [ ]Unable to obtain due to poor mentation   Source if other than patient:  [ ]Family   [ ]Team     Pain: [ ] yes [x ] no  QOL impact -   Location -                    Aggravating factors -  Quality -  Radiation -  Timing-  Severity (0-10 scale):  Minimal acceptable level (0-10 scale):     PAIN AD Score:     [email or web address] (press ctrl +  left click to view)    Dyspnea:                           [ ]Mild [ ]Moderate [ ]Severe  Anxiety:                             [ ]Mild [ ]Moderate [ ]Severe  Fatigue:                             [ ]Mild [ ]Moderate [ ]Severe  Nausea:                             [ ]Mild [ ]Moderate [ ]Severe  Loss of appetite:              [ ]Mild [ ]Moderate [ ]Severe  Constipation:                    [ ]Mild [ ]Moderate [ ]Severe    Other Symptoms:  [ ]All other review of systems negative     Karnofsky Performance Score/Palliative Performance Status Version 2:     20   %    [email or web address]  PHYSICAL EXAM:  Vital Signs Last 24 Hrs  T(C): 35.4 (2022 11:08), Max: 36.5 (2022 07:21)  T(F): 95.7 (2022 11:08), Max: 97.7 (2022 07:21)  HR: 97 (2022 10:30) (57 - 100)  BP: 108/83 (2022 10:30) (73/33 - 135/54)  BP(mean): 89 (2022 10:30) (42 - 107)  RR: 19 (2022 10:30) (10 - 22)  SpO2: 96% (2022 10:30) (88% - 100%) I&O's Summary    2022 07:  -  2022 07:00  --------------------------------------------------------  IN: 614.2 mL / OUT: 435 mL / NET: 179.2 mL    2022 07:01  -  2022 11:18  --------------------------------------------------------  IN: 47.7 mL / OUT: 30 mL / NET: 17.7 mL    GENERAL:  [x ]Alert  [x ]Oriented x  2 [ ]Lethargic  [ ]Cachexia  [ ]Unarousable  [x ]Verbal  [ ]Non-Verbal  Behavioral:   [ ] Anxiety  [ ] Delirium [ ] Agitation [ ] Other  HEENT:  [ ]Normal   [x ]Dry mouth   [ ]ET Tube/Trach  [ ]Oral lesions  PULMONARY:   [ ]Clear [ ]Tachypnea  [ ]Audible excessive secretions   [ ]Rhonchi        [ ]Right [ ]Left [ ]Bilateral  [ ]Crackles        [ ]Right [ ]Left [ ]Bilateral  [ ]Wheezing     [ ]Right [ ]Left [ ]Bilateral  diminished breath sounds bilaterally  CARDIOVASCULAR:    [x ]Regular [ ]Irregular [ ]Tachy  [ ]Lit [ ]Murmur [ ]Other  GASTROINTESTINAL:  [x ]Soft  [ ]Distended   [ ]+BS  [ ]Non tender [ ]Tender  [ ]PEG [ ]OGT/ NGT  Last BM: ??? GENITOURINARY:  [ ]Normal [ ] Incontinent   [ ]Oliguria/Anuria   [x ][place]  MUSCULOSKELETAL:   [ ]Normal   [ ]Weakness  [x ]Bed/Wheelchair bound [ ]Edema  NEUROLOGIC:   [ ]No focal deficits  [x ] Cognitive impairment  [ ] Dysphagia [ ]Dysarthria [ ] Paresis [ ]Other   SKIN:   [ ]Normal   [ ]Pressure ulcer(s)  [ ]Rash  right arm skin tear    CRITICAL CARE:  [ ] Shock Present  [ ]Septic [ ]Cardiogenic [ ]Neurologic [ ]Hypovolemic  [ ]  Vasopressors [ ]  Inotropes   [ ] Respiratory failure present [ ] mechanical ventilation [ ] non-invasive ventilatory support [ ] High flow  [ ] Acute  [ ] Chronic [ ] Hypoxic  [ ] Hypercarbic [ ] Other  [ ] Other organ failure     LABS:                        7.8    8.63  )-----------( 189      ( 2022 05:06 )             23.0   -    137  |  108  |  19  ----------------------------<  83  3.9   |  24  |  0.53    Ca    7.7<L>      2022 05:06  Phos  2.9       Mg     2.3         TPro  4.6<L>  /  Alb  1.5<L>  /  TBili  0.7  /  DBili  x   /  AST  17  /  ALT  17  /  AlkPhos  79      Culture - Blood (22 @ 09:03)    -  Coagulase negative Staphylococcus: Detec    Gram Stain:   Growth in aerobic bottle: Gram Positive Cocci in Clusters    Specimen Source: .Blood Blood-Peripheral    Organism: Blood Culture PCR    Culture Results:   Growth in aerobic bottle: Coag Negative Staphylococcus Unable to Identify  further  Coag Negative Staphylococcus  Single set isolate, possible contaminant. Contact  Microbiology if susceptibility testing clinically  indicated.  ***Blood Panel PCR results on this specimen are available  approximately 3 hours after the Gram stain result.***  Gram stain, PCR, and/or culture results may not always  correspond due to difference in methodologies.  ************************************************************  This PCR assay was performed by multiplex PCR. This  Assay tests for 66 bacterial and resistance gene targets.  Please refer to the [facility] test directory  at [email or web address] for details.    Organism Identification: Blood Culture PCR    Method Type: PCR    RADIOLOGY & ADDITIONAL STUDIES:   < from: US Duplex Venous Upper Ext Complete, Bilateral (22 @ 19:28) >  ACC: [id number] EXAM:  US DPLX UPR EXT VEINS COMPL BI                        PROCEDURE DATE:  2022    INTERPRETATION:  CLINICAL INFORMATION: Sepsis. Covid positive. Swelling   in the upper extremities.  COMPARISON: None available.  TECHNIQUE: Duplex sonography of the BILATERAL upper extremity veins with   color and spectral Doppler, with and without compression.  FINDINGS:  The right internal jugular, subclavian, axillary, brachial, ulnar and   radial veins are patent and compressible where applicable.  The basilic   and cephalic veins (superficial veins) are patent and without thrombus.  The left internal jugular, subclavian, axillary, brachial, ulnar and   radial veins are patent and compressible where applicable.  The basilic   and cephalic veins (superficial veins) are patent and without thrombus.  Doppler examination shows normal spontaneous and phasic flow.  IMPRESSION:  No evidence of deep venous thrombosis in either upper extremity.  --- End of Report ---  < end of copied text >    < from: NM Hepatobiliary Imaging (22 @ 18:00) >  ACC: [id number] EXAM:  NM HEPATOBILIARY IMG                        PROCEDURE DATE:  2022    INTERPRETATION:  RADIOPHARMACEUTICAL:  99mTc-Mebrofenin  DOSE: 3 mCi IV  CLINICAL INFORMATION: 89 year old female with elevated liver enzymes,  cholelithiasis,, referred to evaluate for acute cholecystitis  TECHNIQUE: Dynamic images of the anterior abdomen were obtained for 1   hour immediately following radiotracer injection. Static images of the   abdomen in the anterior projection wasalso obtained. This study is   limited and technically suboptimal due to patient motion.  COMPARISON: No previous hepatobiliary scan for comparison  FINDINGS: The study is limited due to patient motion. There is prompt   uptake of the injected radiotracer by the hepatocytes. The gallbladder is   first visualized at 50 minutes post tracer injection and bowel activity   by 25 minutes. There is normal tracer clearance from the liver by the end   of the study.  IMPRESSION: Limited hepatobiliaryscan.  No scan evidence of acute cholecystitis.  --- End of Report ---  < end of copied text >    < from: US Abdomen Limited (22 @ 10:43) >  ACC: [id number] EXAM:  US ABDOMEN LIMITED                        PROCEDURE DATE:  2022    INTERPRETATION:  CLINICAL INFORMATION: Elevated liver enzymes  COMPARISON: None available.  TECHNIQUE: Sonography of the right upper quadrant.  FINDINGS:  Liver: Within normal limits.  Bile ducts: Mild dilatation. Common bile duct measures 9 mm.  Gallbladder: Distended with cholelithiasis and sludge. Wall thickening.   Questionable focal tenderness noted by technologist.  Pancreas: Dilated duct measuring 4 mm.  Right kidney: 8.3 cm. No hydronephrosis.  Ascites: None.  IVC: Visualized portions are within normal limits.  Other: Right pleural effusion.  IMPRESSION:  Distended gallbladder with gallstones and sludge. Wall thickening present.  Questionable focal tenderness. Recommend nuclear medicine hepatobiliary   scan for further assessment.  Dilated biliary and pancreatic ducts.  --- End of Report ---  < end of copied text >    < from: CT Head No Cont (22 @ 02:41) >  ACC: [id number] EXAM:  CT BRAIN                        PROCEDURE DATE:  2022    INTERPRETATION:  NONCONTRAST CT OF THE BRAIN DATED 2022..  CLINICAL INFORMATION:  Change in mental status.  TECHNIQUE: Axial CT images are obtainedfrom the cranial vertex to the   skull base without the administration of IV contrast. Images are   reformatted in sagittal and coronal planes.  The study is correlated with a CT the head from 2020 and MRI of the   brain from 2020.  FINDINGS:  There is no acute intra-axial or extra-axial hemorrhage. There is no mass   effect or shift of the midline. The basal cisterns are not effaced. There   is cerebral and cerebellar volume loss with prominence of the ventricles,   sulci, and cerebellar folia. There are moderate chronic ischemic changes   in the frontoparietal white matter. There are atherosclerotic   calcifications of the intracranial carotid arteries.  The regional soft tissues and osseous structures are unremarkable apart  from evidence of bilateral lens surgery. The visualized paranasal sinuses   and tympanic/mastoid cavities are clear apart from mild bilateral ethmoid   and maxillary sinus mucosal thickening and mucous retention cysts versus   polyps in the right and left maxillary antrums.  IMPRESSION:  No acute intracranial hemorrhage or mass effect. Moderate chronic   ischemic changes in the frontoparietal white matter.  --- End of Report ---  < end of copied text >    < from: Xray Chest 1 View- PORTABLE-Urgent (22 @ 00:39) >  ACC: [id number] EXAM:  XR CHEST PORTABLE URGENT 1V                        PROCEDURE DATE:  2022    INTERPRETATION:  AP supine chest on 2022 at 12:27 AM.   Patient has sepsis.  Film rotated to the right obscuring right base.  Heart magnified by technique.  There are bibasilar infiltrates new since May 7, 2020.  IMPRESSION: Bibasilar infiltrates.  --- End of Report ---  < end of copied text >    < from: TTE Echo Complete w/o Contrast w/ Doppler (22 @ 09:25) >   EXAM:  ECHO TTE WO CON COMP W DOPP    PROCEDURE DATE:  2022    INTERPRETATION:  TRANSTHORACIC ECHOCARDIOGRAM REPORT  Patient Name:   SHAMAR GUILLERMO Patient Location:   Medical Rec #:  [id number]  Accession #:      [id number]  Account #:                  Height:           65.7 in 167.0 cm  YOB: 1929    Weight:           103.6 lb 47.00 kg  Patient Age:    89 years    BSA:              1.51 m²  Patient Gender: F           BP:               136/48 mmHg  Date of Exam:        2022 9:25:49 AM  Sonographer:         SALVADOR  Referring Physician: CRYSTAL BURGOS  Procedure:     2D Echo/Doppler/Color Doppler Complete.  Indications:   Abnormal electrocardiogram [ECG] [EKG] - R94.31  Diagnosis:     Abnormal electrocardiogram [ECG] [EKG] - R94.31  Study Details: Technically limited study. Study quality was adversely   affected                 due to body habitus and the patient being uncooperative.  2D AND M-MODE MEASUREMENTS (normal ranges within parentheses):  Left                 Normal   Aorta/Left            Normal  Ventricle:                    Atrium:  IVSd (2D):    0.95  (0.7-1.1) Aortic Root    2.57  (2.4-3.7)                 cm             (2D):           cm  LVPWd (2D):   0.79  (0.7-1.1) Left Atrium    2.33  (1.9-4.0)                 cm             (2D):           cm  LVIDd (2D):   3.64  (3.4-5.7) LA Vol Index   27.2                 cm             (A4C):        ml/m²  LVIDs (2D):   2.46            LA Vol Index   17.9                 cm       (A2C):        ml/m²  LV FS (2D):   32.4   (>25%)   LA Vol Index   23.1                  %             (BP):         ml/m²  Relative Wall 0.43   (<0.42)  Right  Thickness                     Ventricle:                                TAPSE:    1.71 cm    LV DIASTOLIC FUNCTION:  MV Peak E: 0.76 m/s Decel Time:  149 msec  MV Peak A: 1.39 m/s Septal E/e'  19.9  E/A Ratio: 0.55     Lateral E/e' 10.3  Septal e'  0.0 m/s  Lateral e' 0.1 m/s    SPECTRAL DOPPLER ANALYSIS (where applicable):  Mitral Valve:  MV Max Neftaly:   1.35 m/s MV P1/2 Time: 43.26 msec  MV Mean Grad: 2.0 mmHg MV Area, PHT: 5.09 cm²    Aortic Valve: AoV Max Neftaly: 1.97 m/s AoV Peak PG: 15.6 mmHg AoV Mean P.4 mmHg    LVOT Vmax: 1.26 m/s LVOT VTI: 0.166 m LVOT Diameter: 1.69 cm    AoV Area, Vmax: 1.43 cm² AoV Area, VTI: 1.18 cm² AoV Area, Vmn: 1.11 cm²    Aortic Insufficiency:  AI Half-time:  501 msec  AI Decel Rate: 1.63 m/s²    Tricuspid Valve and PA/RV Systolic Pressure: TR Max Velocity: 2.97 m/s RA   Pressure: 5 mmHg RVSP/PASP: 40.3 mmHg    PHYSICIAN INTERPRETATION:  Left Ventricle: Normal left ventricular size and wall thicknesses, with   normal systolic and diastolic function.  Global LV systolic function was normal. Left ventricular ejection   fraction, by visual estimation, is 65 to 70%. Limited imaging; patient   not cooperative and could not be adequately positioned.  Right Ventricle: Normal right ventricular size and function.  Left Atrium: The left atrium is normal in size.  Right Atrium: The right atrium is normal in size.  Pericardium: There is no evidence of pericardial effusion.  Mitral Valve: Mild thickening and calcification of the anterior and   posterior mitral valve leaflets. Peak transmitral mean gradient equals   2.0 mmHg, calculated mitral valve area by pressure half time equals 5.09   cm² consistent with No evidence of mitral stenosis. Mitral valve mean   gradient is 2.0 mmHg consistent with mild mitral stenosis. Trace mitral   valve regurgitation is seen.  Tricuspid Valve: Structurally normal tricuspid valve, with normal leaflet   excursion. Moderate tricuspid regurgitation is visualized. Estimated   pulmonary artery systolic pressure is 40.3 mmHg assuming a right atrial   pressure of 5 mmHg, which is consistent with mild pulmonary hypertension.  Aortic Valve: Normal trileaflet aortic valve with normal opening. No   evidence of aortic valve regurgitation is seen.  Pulmonic Valve: Structurally normal pulmonic valve, with normal leaflet   excursion. No indication of pulmonic valve regurgitation.  Aorta: The aortic root and ascending aorta are structurally normal, with   no evidence of dilitation.  Pulmonary Artery: The main pulmonary artery is normal in size.    Summary:   1. Left ventricular ejection fraction, by visual estimation, is 65 to   70%.   2. Normal global left ventricular systolic function.   3. Limited imaging; patient not cooperative and could not be adequately   positioned.   4. Mild thickening and calcification of the anterior and posterior   mitral valve leaflets.   5. Trace mitral valve regurgitation.   6. Moderate tricuspid regurgitation.   7. Estimated pulmonary artery systolic pressure is 40.3 mmHg assuming a   right atrial pressure of 5 mmHg, which is consistent with mild pulmonary   hypertension.   8. Mitral valve mean gradient is 2.0 mmHg consistent with mild mitral   stenosis.    [id number] Miki Rice MD, FACC  Electronically signed on 2022 at 12:56:19 PM  *** Final ***  < end of copied text >    PROTEIN CALORIE MALNUTRITION PRESENT: [ ] Yes [ ] No  [x ] PPSV2 < or = to 30% [ ] significant weight loss  [x ] poor nutritional intake [x ] catabolic state [ ] anasarca     Artificial Nutrition [ ]     REFERRALS:   [ ]Chaplaincy  [ ] Hospice  [ ]Child Life  [ ]Social Work  [ ]Case management [ ]Holistic Therapy     Goals of Care Document:

## 2022-03-01 NOTE — SWALLOW BEDSIDE ASSESSMENT ADULT - SPECIFY REASON(S)
Clinical Re-assessment of swallow function ; 2/23 previous eval attempt but pt refused Clinical assessment of swallow function ; 2/23 previous eval attempt but pt refused; Pt admitted 2/17/22

## 2022-03-01 NOTE — SWALLOW BEDSIDE ASSESSMENT ADULT - COMMENTS
PMH asthma (not on meds), HTN (not on meds), dementia (baseline is that pt can walk and talk but needs assistance with all ADLs); : per daughter in law, pt walks and talks at baseline   2/17 CXR  Bibasilar infiltrates; CT head No acute intracranial hemorrhage or mass effect. Moderate chronic ischemic changes in the frontoparietal white matter.  3/1 MD note septic shock secondary to COVID-19 PNA and DARREN likely secondary to ATN/sepsis/COVID with hospital course c/b persistent bradyarrythmias causing hypotension requiring continuous dopamine support as well as acute blood loss anemia. PMH asthma (not on meds), HTN (not on meds), dementia (baseline is that pt can walk and talk but needs assistance with all ADLs)   2/17 CXR  Bibasilar infiltrates; CT head No acute intracranial hemorrhage or mass effect. Moderate chronic ischemic changes in the frontoparietal white matter  3/1 MD note septic shock secondary to COVID-19 PNA and DARREN likely secondary to ATN/sepsis/COVID with hospital course c/b persistent bradyarrythmias causing hypotension requiring continuous dopamine support as well as acute blood loss anemia PCA reports pt has been eating from puree meal tray; pt offered soft cracker trial but refused with gesture and "no"

## 2022-03-01 NOTE — SWALLOW BEDSIDE ASSESSMENT ADULT - SLP PERTINENT HISTORY OF CURRENT PROBLEM
hypotension, COVID pneumoniaethargy, weakness, FTT over the last 2-3 days. Pt received first dose Pfizer 2 weeks ago. hypotension, COVID pneumonia; admitted with lethargy, weakness, FTT over the last 2-3 days. Pt received first dose Pfizer 2 weeks ago

## 2022-03-01 NOTE — SWALLOW BEDSIDE ASSESSMENT ADULT - SLP GENERAL OBSERVATIONS
alert but minimally verbal and did not follow directions; restless and primarily refused intake; once cup was placed in her hand she did drink liquids multiple times

## 2022-03-01 NOTE — PROGRESS NOTE ADULT - ASSESSMENT
89 y/o F w/dementia admitted for hypotension due to severe sepsis with septic shock secondary to COVID-19 PNA and DARREN likely secondary to ATN/sepsis/COVID with hospital course c/b persistent bradyarrythmias causing hypotension requiring continuous dopamine support as well as acute blood loss anemia.    - Continue dopamine, wean as tolerated  - Repeat cardiology eval for pacemaker as patient unable to come off dopamine  - Completed dexamethasone, remdesivir  - Trend CBC, transfuse PRN for hgb < 7  - Trend Cr, avoid nephrotoxins  - DVT prophylaxis  - DNR  - Guarded prognosis    I have personally provided 35 minutes of attending critical care time excluding procedures.

## 2022-03-01 NOTE — SWALLOW BEDSIDE ASSESSMENT ADULT - NS SPL SWALLOW CLINIC TRIAL FT
pt refused and spit out when fed by SLP; pt accepted and drank on her own when cup placed in right hand; frequent moan/groan phonation remained clear after thin liquid swallows

## 2022-03-01 NOTE — PROGRESS NOTE ADULT - PROVIDER SPECIALTY LIST ADULT
Cardiology
Critical Care
Critical Care
Endocrinology
Endocrinology
Infectious Disease
Critical Care
Infectious Disease
Cardiology
Critical Care
Endocrinology
Endocrinology
Surgery
Critical Care
Critical Care
Infectious Disease
Infectious Disease
Critical Care
Endocrinology
Endocrinology
Critical Care

## 2022-03-01 NOTE — PROGRESS NOTE ADULT - SUBJECTIVE AND OBJECTIVE BOX
HPI:  89 YO female with PMH asthma (not on meds), HTN (not on meds), dementia (baseline is that pt can walk and talk but needs assistance with all ADLs), presents for lethargy, weakness, FTT over the last 2-3 days. Pt received first dose Pfizer 2 weeks ago.  In the ED, pt found to be COVID positive with labs significant for lactate 6.8, creatinine 1.5, sodium 147.  CT head negative for acute pathology.  CXR with patchy opacity of right lung.  Pt was given 1.5L of IVF and remained hypotensive peripheral levophed started.    Admit to ICU for septic shock 2/2 COVID pneumonia. (17 Feb 2022 04:24)      24 hr events: No acute events. Feels ok. No chest pain, dyspnea, fevers, chills, nausea, emesis, or diarrhea.    ## ROS:  See above. ROS otherwise negative.     ## Vitals  ICU Vital Signs Last 24 Hrs  T(C): 36.1 (01 Mar 2022 04:00), Max: 37.3 (28 Feb 2022 23:41)  T(F): 97 (01 Mar 2022 04:00), Max: 99.1 (28 Feb 2022 23:41)  HR: 73 (01 Mar 2022 07:00) (64 - 104)  BP: 141/58 (01 Mar 2022 07:00) (67/54 - 141/58)  BP(mean): 76 (01 Mar 2022 07:00) (42 - 116)  ABP: --  ABP(mean): --  RR: 16 (01 Mar 2022 07:00) (12 - 22)  SpO2: 94% (01 Mar 2022 07:00) (83% - 100%)      ## Physical Exam:  Gen: Elderly female lying comfortably in bed, NAD  HEENT: NC/AT sclerae anicteric  Resp: No increased WOB, CTAB  CV: S1, S2  Abd: Soft, + BS  Ext: WWP  Neuro: Awake, alert follows commands. Moves all extremities      ## Vent Data      ## Labs:  Chem:  03-01    138  |  107  |  17  ----------------------------<  108<H>  4.0   |  29  |  0.72    Ca    7.9<L>      01 Mar 2022 06:10  Phos  2.5     03-01  Mg     2.0     03-01    TPro  4.9<L>  /  Alb  1.5<L>  /  TBili  2.1<H>  /  DBili  x   /  AST  21  /  ALT  17  /  AlkPhos  89  03-01    LIVER FUNCTIONS - ( 01 Mar 2022 03:56 )  Alb: 1.5 g/dL / Pro: 4.9 gm/dL / ALK PHOS: 89 U/L / ALT: 17 U/L / AST: 21 U/L / GGT: x           CBC:                        8.4    8.78  )-----------( 225      ( 01 Mar 2022 06:10 )             25.8     Coags:          ## Cardiac        ## Blood Gas      #I/Os  I&O's Detail    28 Feb 2022 07:01  -  01 Mar 2022 07:00  --------------------------------------------------------  IN:    DOPamine Infusion: 322.6 mL  Total IN: 322.6 mL    OUT:    Indwelling Catheter - Urethral (mL): 355 mL  Total OUT: 355 mL    Total NET: -32.4 mL          ## Imaging:    ## Medications:  MEDICATIONS  (STANDING):  chlorhexidine 2% Cloths 1 Application(s) Topical <User Schedule>  DOPamine Infusion 4.999 MICROgram(s)/kG/Min (8.81 mL/Hr) IV Continuous <Continuous>  heparin   Injectable 5000 Unit(s) SubCutaneous every 12 hours    MEDICATIONS  (PRN):

## 2022-03-01 NOTE — PROGRESS NOTE ADULT - REASON FOR ADMISSION
hypotension, COVID pneumonia

## 2022-03-01 NOTE — PROGRESS NOTE ADULT - NUTRITIONAL ASSESSMENT
This patient has been assessed with a concern for Malnutrition and has been determined to have a diagnosis/diagnoses of Severe protein-calorie malnutrition.    This patient is being managed with:   Diet Pureed-  No Liquids (NOLIQUIDS)  Entered: Feb 20 2022  2:36PM    
This patient has been assessed with a concern for Malnutrition and has been determined to have a diagnosis/diagnoses of Severe protein-calorie malnutrition.    This patient is being managed with:   Diet Pureed-  No Liquids (NOLIQUIDS)  Entered: Feb 20 2022  2:36PM    
This patient has been assessed with a concern for Malnutrition and has been determined to have a diagnosis/diagnoses of Severe protein-calorie malnutrition.    This patient is being managed with:   Diet NPO-  Entered: Feb 17 2022  3:56AM    
This patient has been assessed with a concern for Malnutrition and has been determined to have a diagnosis/diagnoses of Severe protein-calorie malnutrition.    This patient is being managed with:   Diet Pureed-  No Liquids (NOLIQUIDS)  Entered: Feb 20 2022  2:36PM    

## 2022-03-01 NOTE — SWALLOW BEDSIDE ASSESSMENT ADULT - SWALLOW EVAL: DIAGNOSIS
Oropharyngeal dysphagia associated with cognitive deficits in setting of acute medical status; feeding behaviors of spitting out, bolus hold and refusal are consistent with dementia; based on limited intake; there are no overt s/s aspiration of modified consistencies; Pt is at high risk for poor nutrition/hydration due to poor intake

## 2022-03-01 NOTE — DISCHARGE NOTE FOR THE EXPIRED PATIENT - HOSPITAL COURSE
89 YO female with PMH asthma (not on meds), HTN (not on meds), dementia (baseline is that pt can walk and talk but needs assistance with all ADLs), presents for lethargy, weakness, FTT over the last 2-3 days. Pt received first dose Pfizer 2 weeks ago.  In the ED, pt found to be COVID positive with labs significant for lactate 6.8, creatinine 1.5, sodium 147.  CXR with patchy opacity of right lung.  Pt was given 1.5L of IVF and remained hypotensive peripheral levophed started.  Completed a course of remdesivir, and zosyn   Patient was started on dopamine, unable to titrate down with out bradycardia, remained on levophed,  was DNR,   patient passed 9/1/2022 89F PMH asthma (not on meds), HTN (not on meds), dementia (baseline can walk and talk but needs assistance with all ADLs), presents for lethargy and weakness with decreased PO intake over 2-3 days. s/p 1st dose of Pfizer COVID vaccination 2 weeks prior. Hypotensive in ED, lactate 6.8, Cr 1.5, sodium 147. CXR with bibasilar infiltrates consistent with COVID pneumonia. Tested + for COVID. Admitted to ICU for severe sepsis/septic shock, lactic acidosis, DARREN with ATN, COVID PNA, hypernatremia, acute metabolic encephalopathy failure to thrive. Course also with bradycardia HR 30-40s. Found to have low TSH and low T3 T4, sick euthyroid syndrome.  Pt was noted to be vasopressor dependent unable to wean remaining on dopamine and levophed.  Pt was made DNR on 2/23/2022.  Pt remained on vasopressors unable to wean.  Patient became bradycardic and with progression to asystole.

## 2022-03-01 NOTE — SWALLOW BEDSIDE ASSESSMENT ADULT - ADDITIONAL RECOMMENDATIONS
ALTERNATE FOODS WITH LIQUIDS; PLACE CUP IN HER RIGHT HAND TO FACILITATE INTAKE; ENCOURAGE PREFERRED FOODS AND LIQUIDS

## 2022-03-04 DIAGNOSIS — E43 UNSPECIFIED SEVERE PROTEIN-CALORIE MALNUTRITION: ICD-10-CM

## 2022-03-04 DIAGNOSIS — R74.8 ABNORMAL LEVELS OF OTHER SERUM ENZYMES: ICD-10-CM

## 2022-03-04 DIAGNOSIS — F03.90 UNSPECIFIED DEMENTIA WITHOUT BEHAVIORAL DISTURBANCE: ICD-10-CM

## 2022-03-04 DIAGNOSIS — E87.6 HYPOKALEMIA: ICD-10-CM

## 2022-03-04 DIAGNOSIS — R62.7 ADULT FAILURE TO THRIVE: ICD-10-CM

## 2022-03-04 DIAGNOSIS — R53.2 FUNCTIONAL QUADRIPLEGIA: ICD-10-CM

## 2022-03-04 DIAGNOSIS — R65.21 SEVERE SEPSIS WITH SEPTIC SHOCK: ICD-10-CM

## 2022-03-04 DIAGNOSIS — D69.6 THROMBOCYTOPENIA, UNSPECIFIED: ICD-10-CM

## 2022-03-04 DIAGNOSIS — U07.1 COVID-19: ICD-10-CM

## 2022-03-04 DIAGNOSIS — E07.81 SICK-EUTHYROID SYNDROME: ICD-10-CM

## 2022-03-04 DIAGNOSIS — J45.909 UNSPECIFIED ASTHMA, UNCOMPLICATED: ICD-10-CM

## 2022-03-04 DIAGNOSIS — E87.2 ACIDOSIS: ICD-10-CM

## 2022-03-04 DIAGNOSIS — J96.00 ACUTE RESPIRATORY FAILURE, UNSPECIFIED WHETHER WITH HYPOXIA OR HYPERCAPNIA: ICD-10-CM

## 2022-03-04 DIAGNOSIS — Z66 DO NOT RESUSCITATE: ICD-10-CM

## 2022-03-04 DIAGNOSIS — K80.20 CALCULUS OF GALLBLADDER WITHOUT CHOLECYSTITIS WITHOUT OBSTRUCTION: ICD-10-CM

## 2022-03-04 DIAGNOSIS — A41.89 OTHER SPECIFIED SEPSIS: ICD-10-CM

## 2022-03-04 DIAGNOSIS — J12.82 PNEUMONIA DUE TO CORONAVIRUS DISEASE 2019: ICD-10-CM

## 2022-03-04 DIAGNOSIS — E87.0 HYPEROSMOLALITY AND HYPERNATREMIA: ICD-10-CM

## 2022-03-04 DIAGNOSIS — Z74.01 BED CONFINEMENT STATUS: ICD-10-CM

## 2022-03-04 DIAGNOSIS — N17.0 ACUTE KIDNEY FAILURE WITH TUBULAR NECROSIS: ICD-10-CM

## 2022-03-04 DIAGNOSIS — Z78.1 PHYSICAL RESTRAINT STATUS: ICD-10-CM

## 2022-03-04 DIAGNOSIS — D62 ACUTE POSTHEMORRHAGIC ANEMIA: ICD-10-CM

## 2022-03-04 DIAGNOSIS — G93.41 METABOLIC ENCEPHALOPATHY: ICD-10-CM

## 2023-01-24 NOTE — CONSULT NOTE ADULT - PROBLEM SELECTOR RECOMMENDATION 9
POST-OP DIAGNOSIS:  Primary osteoarthritis of left knee 24-Jan-2023 11:52:17  Carlos Suh  
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  She is not bradycardic as her heart rate is around 60-70.  Also with low TSH we should rule out any subclinical hyperthyroidism which actually may give rise to tachycardia rather than bradycardia.  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  Also TSH and total T4 is also decreased.  Nothing needs to be done currently and once patient recovers the thyroid function tests may recover  Recently thyroid function test done and not well labs are being erroneous with TSH around 0.2, free thyroxine ranging from 0.8-1.0, total T3 being low from 40-80 and total T4 also being very low and less than 5.0. Seems to be reagent or lab processing problem. Most of these patients do not have a history of thyroid disease and a clinically euthyroid   Thank You for the courtesy of this consultation !!!
was on norepi   remains on dopamine-unable to wean  lactate on arrival 6.8-improved   DARREN improved  s/p Zosyn

## 2024-03-08 NOTE — ED PROVIDER NOTE - GASTROINTESTINAL, MLM
Abdomen soft, non-tender, no guarding. Quality 226: Preventive Care And Screening: Tobacco Use: Screening And Cessation Intervention: Patient screened for tobacco use and is an ex/non-smoker Detail Level: Detailed Quality 431: Preventive Care And Screening: Unhealthy Alcohol Use - Screening: Patient not identified as an unhealthy alcohol user when screened for unhealthy alcohol use using a systematic screening method